# Patient Record
Sex: FEMALE | Race: WHITE | NOT HISPANIC OR LATINO | Employment: OTHER | URBAN - METROPOLITAN AREA
[De-identification: names, ages, dates, MRNs, and addresses within clinical notes are randomized per-mention and may not be internally consistent; named-entity substitution may affect disease eponyms.]

---

## 2017-01-24 ENCOUNTER — HOSPITAL ENCOUNTER (OUTPATIENT)
Dept: ONCOLOGY | Facility: CLINIC | Age: 77
Discharge: HOME | End: 2017-01-24
Attending: INTERNAL MEDICINE | Admitting: INTERNAL MEDICINE

## 2017-06-13 ENCOUNTER — HOSPITAL ENCOUNTER (OUTPATIENT)
Dept: ONCOLOGY | Facility: CLINIC | Age: 77
Discharge: HOME | End: 2017-06-13
Attending: INTERNAL MEDICINE | Admitting: INTERNAL MEDICINE

## 2018-04-03 ENCOUNTER — HOSPITAL ENCOUNTER (OUTPATIENT)
Dept: ONCOLOGY | Facility: CLINIC | Age: 78
Discharge: HOME | End: 2018-04-03
Attending: INTERNAL MEDICINE | Admitting: INTERNAL MEDICINE

## 2018-10-02 ENCOUNTER — HOSPITAL ENCOUNTER (OUTPATIENT)
Dept: ONCOLOGY | Facility: CLINIC | Age: 78
Discharge: HOME | End: 2018-10-02
Attending: INTERNAL MEDICINE | Admitting: INTERNAL MEDICINE

## 2018-10-29 ENCOUNTER — HOSPITAL ENCOUNTER (OUTPATIENT)
Dept: MAMMOGRAPHY | Facility: HOSPITAL | Age: 78
Discharge: HOME | End: 2018-10-29
Attending: NURSE PRACTITIONER | Admitting: NURSE PRACTITIONER

## 2019-01-08 ENCOUNTER — HOSPITAL ENCOUNTER (OUTPATIENT)
Dept: ONCOLOGY | Facility: CLINIC | Age: 79
Discharge: HOME | End: 2019-01-08
Attending: INTERNAL MEDICINE | Admitting: INTERNAL MEDICINE

## 2019-03-19 ENCOUNTER — HOSPITAL ENCOUNTER (OUTPATIENT)
Dept: ONCOLOGY | Facility: CLINIC | Age: 79
Discharge: HOME | End: 2019-03-19
Attending: INTERNAL MEDICINE | Admitting: INTERNAL MEDICINE

## 2019-06-26 ENCOUNTER — HOSPITAL ENCOUNTER (OUTPATIENT)
Facility: HOSPITAL | Age: 79
Setting detail: OBSERVATION
Discharge: HOME | End: 2019-06-27
Attending: INTERNAL MEDICINE | Admitting: INTERNAL MEDICINE

## 2019-06-26 ENCOUNTER — APPOINTMENT (OUTPATIENT)
Dept: GENERAL RADIOLOGY | Facility: HOSPITAL | Age: 79
End: 2019-06-26

## 2019-06-26 ENCOUNTER — APPOINTMENT (OUTPATIENT)
Dept: CT IMAGING | Facility: HOSPITAL | Age: 79
End: 2019-06-26

## 2019-06-26 DIAGNOSIS — R53.1 WEAKNESS: ICD-10-CM

## 2019-06-26 DIAGNOSIS — S70.02XA CONTUSION OF LEFT HIP, INITIAL ENCOUNTER: ICD-10-CM

## 2019-06-26 DIAGNOSIS — S06.0X0A CONCUSSION WITHOUT LOSS OF CONSCIOUSNESS, INITIAL ENCOUNTER: ICD-10-CM

## 2019-06-26 DIAGNOSIS — S00.91XA ABRASION OF HEAD, INITIAL ENCOUNTER: ICD-10-CM
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
## 2019-06-26 PROCEDURE — 99284 EMERGENCY DEPT VISIT MOD MDM: CPT

## 2019-06-26 PROCEDURE — 70450 CT HEAD/BRAIN W/O DYE: CPT

## 2019-06-26 PROCEDURE — 73502 X-RAY EXAM HIP UNI 2-3 VIEWS: CPT

## 2019-06-27 VITALS
BODY MASS INDEX: 18.83 KG/M2 | TEMPERATURE: 98.7 F | HEIGHT: 63 IN | WEIGHT: 106.26 LBS | RESPIRATION RATE: 16 BRPM | OXYGEN SATURATION: 98 % | SYSTOLIC BLOOD PRESSURE: 138 MMHG | DIASTOLIC BLOOD PRESSURE: 50 MMHG | HEART RATE: 78 BPM

## 2019-06-27 PROBLEM — E44.0 PROTEIN-CALORIE MALNUTRITION, MODERATE (HCC): Chronic | Status: ACTIVE | Noted: 2019-06-27

## 2019-06-27 PROBLEM — N18.9 CHRONIC KIDNEY DISEASE (CKD): Chronic | Status: ACTIVE | Noted: 2019-06-27
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
## 2019-06-27 PROBLEM — D63.1 ANEMIA OF RENAL DISEASE: Chronic | Status: ACTIVE | Noted: 2019-06-27

## 2019-06-27 PROBLEM — N18.9 ANEMIA OF RENAL DISEASE: Chronic | Status: ACTIVE | Noted: 2019-06-27

## 2019-06-27 PROBLEM — Z79.899 POLYPHARMACY: Chronic | Status: ACTIVE | Noted: 2019-06-27

## 2019-06-27 PROBLEM — I10 HYPERTENSION: Chronic | Status: ACTIVE | Noted: 2019-06-27

## 2019-06-27 LAB
ALBUMIN SERPL-MCNC: 4.2 G/DL (ref 3.5–4.8)
ALBUMIN/GLOB SERPL: 1.4 G/DL (ref 1–1.7)
ALP SERPL-CCNC: 63 U/L (ref 32–91)
ALT SERPL W P-5'-P-CCNC: 18 U/L (ref 14–54)
ANION GAP SERPL CALCULATED.3IONS-SCNC: 13.8 MMOL/L (ref 10–20)
ANION GAP SERPL CALCULATED.3IONS-SCNC: 16.8 MMOL/L (ref 10–20)
AST SERPL-CCNC: 23 U/L (ref 15–41)
BASOPHILS # BLD AUTO: 0.1 10*3/MM3 (ref 0–0.2)
BASOPHILS # BLD AUTO: 0.1 10*3/MM3 (ref 0–0.2)
BASOPHILS NFR BLD AUTO: 0.7 % (ref 0–1.5)
BASOPHILS NFR BLD AUTO: 0.8 % (ref 0–1.5)
BILIRUB SERPL-MCNC: 0.6 MG/DL (ref 0.3–1.2)
BUN BLD-MCNC: 24 MG/DL (ref 8–20)
BUN BLD-MCNC: 25 MG/DL (ref 8–20)
BUN/CREAT SERPL: 16 (ref 5.4–26.2)
BUN/CREAT SERPL: 16.7 (ref 5.4–26.2)
CALCIUM SPEC-SCNC: 9.5 MG/DL (ref 8.9–10.3)
CALCIUM SPEC-SCNC: 9.8 MG/DL (ref 8.9–10.3)
CHLORIDE SERPL-SCNC: 101 MMOL/L (ref 101–111)
CHLORIDE SERPL-SCNC: 97 MMOL/L (ref 101–111)
CO2 SERPL-SCNC: 25 MMOL/L (ref 22–32)
CO2 SERPL-SCNC: 25 MMOL/L (ref 22–32)
CREAT BLD-MCNC: 1.5 MG/DL (ref 0.4–1)
CREAT BLD-MCNC: 1.5 MG/DL (ref 0.4–1)
DEPRECATED RDW RBC AUTO: 42.4 FL (ref 37–54)
DEPRECATED RDW RBC AUTO: 42.9 FL (ref 37–54)
EOSINOPHIL # BLD AUTO: 0.1 10*3/MM3 (ref 0–0.4)
EOSINOPHIL # BLD AUTO: 0.1 10*3/MM3 (ref 0–0.4)
EOSINOPHIL NFR BLD AUTO: 1.1 % (ref 0.3–6.2)
EOSINOPHIL NFR BLD AUTO: 1.2 % (ref 0.3–6.2)
ERYTHROCYTE [DISTWIDTH] IN BLOOD BY AUTOMATED COUNT: 12.5 % (ref 12.3–15.4)
ERYTHROCYTE [DISTWIDTH] IN BLOOD BY AUTOMATED COUNT: 12.6 % (ref 12.3–15.4)
GFR SERPL CREATININE-BSD FRML MDRD: 33 ML/MIN/1.73
GFR SERPL CREATININE-BSD FRML MDRD: 33 ML/MIN/1.73
GLOBULIN UR ELPH-MCNC: 2.9 GM/DL (ref 2.5–3.8)
GLUCOSE BLD-MCNC: 117 MG/DL (ref 65–99)
GLUCOSE BLD-MCNC: 119 MG/DL (ref 65–99)
HCT VFR BLD AUTO: 28.1 % (ref 34–46.6)
HCT VFR BLD AUTO: 32.2 % (ref 34–46.6)
HGB BLD-MCNC: 10.8 G/DL (ref 12–15.9)
HGB BLD-MCNC: 9.6 G/DL (ref 12–15.9)
LYMPHOCYTES # BLD AUTO: 3 10*3/MM3 (ref 0.7–3.1)
LYMPHOCYTES # BLD AUTO: 3.5 10*3/MM3 (ref 0.7–3.1)
LYMPHOCYTES NFR BLD AUTO: 27.1 % (ref 19.6–45.3)
LYMPHOCYTES NFR BLD AUTO: 36.4 % (ref 19.6–45.3)
MCH RBC QN AUTO: 32.6 PG (ref 26.6–33)
MCH RBC QN AUTO: 33 PG (ref 26.6–33)
MCHC RBC AUTO-ENTMCNC: 33.6 G/DL (ref 31.5–35.7)
MCHC RBC AUTO-ENTMCNC: 34.2 G/DL (ref 31.5–35.7)
MCV RBC AUTO: 96.5 FL (ref 79–97)
MCV RBC AUTO: 97.1 FL (ref 79–97)
MONOCYTES # BLD AUTO: 0.5 10*3/MM3 (ref 0.1–0.9)
MONOCYTES # BLD AUTO: 0.8 10*3/MM3 (ref 0.1–0.9)
MONOCYTES NFR BLD AUTO: 5.2 % (ref 5–12)
MONOCYTES NFR BLD AUTO: 7.2 % (ref 5–12)
NEUTROPHILS # BLD AUTO: 5.4 10*3/MM3 (ref 1.7–7)
NEUTROPHILS # BLD AUTO: 7 10*3/MM3 (ref 1.7–7)
NEUTROPHILS NFR BLD AUTO: 56.4 % (ref 42.7–76)
NEUTROPHILS NFR BLD AUTO: 63.9 % (ref 42.7–76)
NRBC BLD AUTO-RTO: 0 /100 WBC (ref 0–0.2)
NRBC BLD AUTO-RTO: 0.1 /100 WBC (ref 0–0.2)
PLATELET # BLD AUTO: 258 10*3/MM3 (ref 140–450)
PLATELET # BLD AUTO: 283 10*3/MM3 (ref 140–450)
PMV BLD AUTO: 6.9 FL (ref 6–12)
PMV BLD AUTO: 7.1 FL (ref 6–12)
POTASSIUM BLD-SCNC: 3.8 MMOL/L (ref 3.6–5.1)
POTASSIUM BLD-SCNC: 3.8 MMOL/L (ref 3.6–5.1)
PROT SERPL-MCNC: 7.1 G/DL (ref 6.1–7.9)
RBC # BLD AUTO: 2.92 10*6/MM3 (ref 3.77–5.28)
RBC # BLD AUTO: 3.32 10*6/MM3 (ref 3.77–5.28)
SODIUM BLD-SCNC: 135 MMOL/L (ref 136–144)
SODIUM BLD-SCNC: 136 MMOL/L (ref 136–144)
WBC NRBC COR # BLD: 10.9 10*3/MM3 (ref 3.4–10.8)
WBC NRBC COR # BLD: 9.6 10*3/MM3 (ref 3.4–10.8)

## 2019-06-27 PROCEDURE — G0378 HOSPITAL OBSERVATION PER HR: HCPCS

## 2019-06-27 PROCEDURE — 97161 PT EVAL LOW COMPLEX 20 MIN: CPT

## 2019-06-27 PROCEDURE — 97530 THERAPEUTIC ACTIVITIES: CPT

## 2019-06-27 PROCEDURE — 63710000001 PREDNISONE PER 5 MG: Performed by: PHYSICIAN ASSISTANT

## 2019-06-27 PROCEDURE — 96360 HYDRATION IV INFUSION INIT: CPT

## 2019-06-27 PROCEDURE — 97116 GAIT TRAINING THERAPY: CPT

## 2019-06-27 PROCEDURE — 85025 COMPLETE CBC W/AUTO DIFF WBC: CPT | Performed by: PHYSICIAN ASSISTANT

## 2019-06-27 PROCEDURE — 97165 OT EVAL LOW COMPLEX 30 MIN: CPT

## 2019-06-27 PROCEDURE — 96361 HYDRATE IV INFUSION ADD-ON: CPT

## 2019-06-27 PROCEDURE — 80053 COMPREHEN METABOLIC PANEL: CPT | Performed by: PHYSICIAN ASSISTANT

## 2019-06-27 PROCEDURE — 99236 HOSP IP/OBS SAME DATE HI 85: CPT | Performed by: INTERNAL MEDICINE

## 2019-06-27 RX ORDER — DULOXETIN HYDROCHLORIDE 30 MG/1
60 CAPSULE, DELAYED RELEASE ORAL DAILY
Status: DISCONTINUED | OUTPATIENT
Start: 2019-06-27 | End: 2019-06-27 | Stop reason: HOSPADM

## 2019-06-27 RX ORDER — OXYBUTYNIN CHLORIDE 10 MG/1
10 TABLET, EXTENDED RELEASE ORAL DAILY
COMMUNITY
End: 2019-10-04 | Stop reason: SDUPTHER

## 2019-06-27 RX ORDER — POTASSIUM CHLORIDE 20 MEQ/1
40 TABLET, EXTENDED RELEASE ORAL AS NEEDED
Status: DISCONTINUED | OUTPATIENT
Start: 2019-06-27 | End: 2019-06-27 | Stop reason: HOSPADM

## 2019-06-27 RX ORDER — NITROFURANTOIN 25; 75 MG/1; MG/1
100 CAPSULE ORAL 2 TIMES DAILY
COMMUNITY
End: 2019-06-27 | Stop reason: HOSPADM

## 2019-06-27 RX ORDER — CHOLECALCIFEROL (VITAMIN D3) 125 MCG
5 CAPSULE ORAL NIGHTLY PRN
Status: DISCONTINUED | OUTPATIENT
Start: 2019-06-27 | End: 2019-06-27 | Stop reason: HOSPADM

## 2019-06-27 RX ORDER — ALUMINA, MAGNESIA, AND SIMETHICONE 2400; 2400; 240 MG/30ML; MG/30ML; MG/30ML
15 SUSPENSION ORAL EVERY 6 HOURS PRN
Status: DISCONTINUED | OUTPATIENT
Start: 2019-06-27 | End: 2019-06-27 | Stop reason: HOSPADM

## 2019-06-27 RX ORDER — PANTOPRAZOLE SODIUM 40 MG/1
40 TABLET, DELAYED RELEASE ORAL
Status: DISCONTINUED | OUTPATIENT
Start: 2019-06-27 | End: 2019-06-27 | Stop reason: HOSPADM

## 2019-06-27 RX ORDER — SODIUM CHLORIDE 0.9 % (FLUSH) 0.9 %
3 SYRINGE (ML) INJECTION EVERY 12 HOURS SCHEDULED
Status: DISCONTINUED | OUTPATIENT
Start: 2019-06-27 | End: 2019-06-27 | Stop reason: HOSPADM

## 2019-06-27 RX ORDER — SERTRALINE HYDROCHLORIDE 100 MG/1
100 TABLET, FILM COATED ORAL DAILY
COMMUNITY
End: 2020-01-14 | Stop reason: SDUPTHER

## 2019-06-27 RX ORDER — SERTRALINE HYDROCHLORIDE 100 MG/1
100 TABLET, FILM COATED ORAL DAILY
Status: DISCONTINUED | OUTPATIENT
Start: 2019-06-27 | End: 2019-06-27 | Stop reason: HOSPADM

## 2019-06-27 RX ORDER — POLYETHYLENE GLYCOL 3350 17 G/17G
17 POWDER, FOR SOLUTION ORAL DAILY
Status: DISCONTINUED | OUTPATIENT
Start: 2019-06-27 | End: 2019-06-27 | Stop reason: HOSPADM

## 2019-06-27 RX ORDER — FERROUS SULFATE TAB EC 324 MG (65 MG FE EQUIVALENT) 324 (65 FE) MG
324 TABLET DELAYED RESPONSE ORAL 2 TIMES DAILY
Status: DISCONTINUED | OUTPATIENT
Start: 2019-06-27 | End: 2019-06-27 | Stop reason: HOSPADM

## 2019-06-27 RX ORDER — SODIUM CHLORIDE 0.9 % (FLUSH) 0.9 %
3-10 SYRINGE (ML) INJECTION AS NEEDED
Status: DISCONTINUED | OUTPATIENT
Start: 2019-06-27 | End: 2019-06-27 | Stop reason: HOSPADM

## 2019-06-27 RX ORDER — NITROFURANTOIN 25; 75 MG/1; MG/1
100 CAPSULE ORAL 2 TIMES DAILY
Status: DISCONTINUED | OUTPATIENT
Start: 2019-06-27 | End: 2019-06-27 | Stop reason: HOSPADM

## 2019-06-27 RX ORDER — FENTANYL 50 UG/H
1 PATCH TRANSDERMAL
COMMUNITY
End: 2019-10-04 | Stop reason: SDUPTHER

## 2019-06-27 RX ORDER — DOCUSATE SODIUM 100 MG/1
100 CAPSULE, LIQUID FILLED ORAL 2 TIMES DAILY PRN
Status: DISCONTINUED | OUTPATIENT
Start: 2019-06-27 | End: 2019-06-27 | Stop reason: HOSPADM

## 2019-06-27 RX ORDER — POTASSIUM CHLORIDE 750 MG/1
10 TABLET, EXTENDED RELEASE ORAL 2 TIMES DAILY
COMMUNITY
End: 2019-06-27 | Stop reason: HOSPADM

## 2019-06-27 RX ORDER — ACETAMINOPHEN 650 MG/1
650 SUPPOSITORY RECTAL EVERY 4 HOURS PRN
Status: DISCONTINUED | OUTPATIENT
Start: 2019-06-27 | End: 2019-06-27 | Stop reason: HOSPADM

## 2019-06-27 RX ORDER — DOCUSATE SODIUM 100 MG/1
100 CAPSULE, LIQUID FILLED ORAL DAILY
COMMUNITY
End: 2020-01-14 | Stop reason: SDUPTHER

## 2019-06-27 RX ORDER — SODIUM CHLORIDE 0.9 % (FLUSH) 0.9 %
10 SYRINGE (ML) INJECTION AS NEEDED
Status: DISCONTINUED | OUTPATIENT
Start: 2019-06-27 | End: 2019-06-27 | Stop reason: HOSPADM

## 2019-06-27 RX ORDER — POTASSIUM CHLORIDE 1.5 G/1.77G
40 POWDER, FOR SOLUTION ORAL AS NEEDED
Status: DISCONTINUED | OUTPATIENT
Start: 2019-06-27 | End: 2019-06-27 | Stop reason: HOSPADM

## 2019-06-27 RX ORDER — MEMANTINE HYDROCHLORIDE 10 MG/1
10 TABLET ORAL 2 TIMES DAILY
Status: DISCONTINUED | OUTPATIENT
Start: 2019-06-27 | End: 2019-06-27 | Stop reason: HOSPADM

## 2019-06-27 RX ORDER — FUROSEMIDE 20 MG/1
20 TABLET ORAL
Status: DISCONTINUED | OUTPATIENT
Start: 2019-06-27 | End: 2019-06-27 | Stop reason: HOSPADM

## 2019-06-27 RX ORDER — LEVOTHYROXINE SODIUM 0.05 MG/1
50 TABLET ORAL DAILY
COMMUNITY
End: 2019-11-12

## 2019-06-27 RX ORDER — HYDROCODONE BITARTRATE AND ACETAMINOPHEN 5; 325 MG/1; MG/1
1 TABLET ORAL ONCE AS NEEDED
Status: DISCONTINUED | OUTPATIENT
Start: 2019-06-27 | End: 2019-06-27

## 2019-06-27 RX ORDER — ACETAMINOPHEN 325 MG/1
650 TABLET ORAL EVERY 4 HOURS PRN
Status: DISCONTINUED | OUTPATIENT
Start: 2019-06-27 | End: 2019-06-27 | Stop reason: HOSPADM

## 2019-06-27 RX ORDER — CYCLOBENZAPRINE HCL 10 MG
10 TABLET ORAL 2 TIMES DAILY
COMMUNITY
End: 2019-06-27 | Stop reason: HOSPADM

## 2019-06-27 RX ORDER — DONEPEZIL HYDROCHLORIDE 5 MG/1
10 TABLET, FILM COATED ORAL DAILY
Status: DISCONTINUED | OUTPATIENT
Start: 2019-06-27 | End: 2019-06-27 | Stop reason: HOSPADM

## 2019-06-27 RX ORDER — CALCIUM CARBONATE 200(500)MG
2 TABLET,CHEWABLE ORAL 2 TIMES DAILY PRN
Status: DISCONTINUED | OUTPATIENT
Start: 2019-06-27 | End: 2019-06-27 | Stop reason: HOSPADM

## 2019-06-27 RX ORDER — POLYETHYLENE GLYCOL 3350 17 G/17G
17 POWDER, FOR SOLUTION ORAL AS NEEDED
COMMUNITY
End: 2020-01-14

## 2019-06-27 RX ORDER — HYDRALAZINE HYDROCHLORIDE 25 MG/1
25 TABLET, FILM COATED ORAL 3 TIMES DAILY
Status: DISCONTINUED | OUTPATIENT
Start: 2019-06-27 | End: 2019-06-27 | Stop reason: HOSPADM

## 2019-06-27 RX ORDER — ROPINIROLE 1 MG/1
1 TABLET, FILM COATED ORAL NIGHTLY
Status: ON HOLD | COMMUNITY
End: 2019-06-27

## 2019-06-27 RX ORDER — LORAZEPAM 0.5 MG/1
0.5 TABLET ORAL 2 TIMES DAILY PRN
COMMUNITY
End: 2019-06-27 | Stop reason: HOSPADM

## 2019-06-27 RX ORDER — HYDRALAZINE HYDROCHLORIDE 25 MG/1
25 TABLET, FILM COATED ORAL 3 TIMES DAILY
COMMUNITY
End: 2019-10-04 | Stop reason: SDUPTHER

## 2019-06-27 RX ORDER — FERROUS SULFATE 325(65) MG
325 TABLET ORAL
Qty: 30 TABLET | Refills: 0
Start: 2019-06-27 | End: 2019-07-27

## 2019-06-27 RX ORDER — LUBIPROSTONE 24 UG/1
24 CAPSULE ORAL 2 TIMES DAILY WITH MEALS
COMMUNITY
End: 2019-10-04

## 2019-06-27 RX ORDER — OMEPRAZOLE 40 MG/1
40 CAPSULE, DELAYED RELEASE ORAL 2 TIMES DAILY
COMMUNITY
End: 2020-01-14

## 2019-06-27 RX ORDER — ONDANSETRON 4 MG/1
4 TABLET, FILM COATED ORAL EVERY 6 HOURS PRN
Status: DISCONTINUED | OUTPATIENT
Start: 2019-06-27 | End: 2019-06-27 | Stop reason: HOSPADM

## 2019-06-27 RX ORDER — DULOXETIN HYDROCHLORIDE 60 MG/1
60 CAPSULE, DELAYED RELEASE ORAL DAILY
COMMUNITY
End: 2019-12-17 | Stop reason: SDUPTHER

## 2019-06-27 RX ORDER — FERROUS SULFATE 325(65) MG
325 TABLET ORAL 2 TIMES DAILY
Status: ON HOLD | COMMUNITY
End: 2019-06-27 | Stop reason: SDUPTHER

## 2019-06-27 RX ORDER — LUBIPROSTONE 24 UG/1
24 CAPSULE ORAL 2 TIMES DAILY WITH MEALS
Status: DISCONTINUED | OUTPATIENT
Start: 2019-06-27 | End: 2019-06-27 | Stop reason: HOSPADM

## 2019-06-27 RX ORDER — LEVOTHYROXINE SODIUM 0.05 MG/1
50 TABLET ORAL
Status: DISCONTINUED | OUTPATIENT
Start: 2019-06-27 | End: 2019-06-27 | Stop reason: HOSPADM

## 2019-06-27 RX ORDER — BISACODYL 10 MG
10 SUPPOSITORY, RECTAL RECTAL DAILY PRN
Status: DISCONTINUED | OUTPATIENT
Start: 2019-06-27 | End: 2019-06-27 | Stop reason: HOSPADM

## 2019-06-27 RX ORDER — ROPINIROLE 1 MG/1
1 TABLET, FILM COATED ORAL NIGHTLY
Status: DISCONTINUED | OUTPATIENT
Start: 2019-06-27 | End: 2019-06-27 | Stop reason: HOSPADM

## 2019-06-27 RX ORDER — CYCLOBENZAPRINE HCL 10 MG
10 TABLET ORAL 2 TIMES DAILY
Status: DISCONTINUED | OUTPATIENT
Start: 2019-06-27 | End: 2019-06-27 | Stop reason: HOSPADM

## 2019-06-27 RX ORDER — OXYBUTYNIN CHLORIDE 10 MG/1
10 TABLET, EXTENDED RELEASE ORAL DAILY
Status: DISCONTINUED | OUTPATIENT
Start: 2019-06-27 | End: 2019-06-27 | Stop reason: HOSPADM

## 2019-06-27 RX ORDER — SODIUM CHLORIDE 9 MG/ML
75 INJECTION, SOLUTION INTRAVENOUS CONTINUOUS
Status: DISCONTINUED | OUTPATIENT
Start: 2019-06-27 | End: 2019-06-27 | Stop reason: HOSPADM

## 2019-06-27 RX ORDER — ONDANSETRON 2 MG/ML
4 INJECTION INTRAMUSCULAR; INTRAVENOUS EVERY 6 HOURS PRN
Status: DISCONTINUED | OUTPATIENT
Start: 2019-06-27 | End: 2019-06-27 | Stop reason: HOSPADM

## 2019-06-27 RX ORDER — PREDNISONE 1 MG/1
5 TABLET ORAL DAILY
Status: DISCONTINUED | OUTPATIENT
Start: 2019-06-27 | End: 2019-06-27 | Stop reason: HOSPADM

## 2019-06-27 RX ORDER — ROPINIROLE 1 MG/1
0.5 TABLET, FILM COATED ORAL NIGHTLY
Qty: 15 TABLET | Refills: 0
Start: 2019-06-27 | End: 2019-07-27

## 2019-06-27 RX ORDER — CLONIDINE HYDROCHLORIDE 0.1 MG/1
0.1 TABLET ORAL ONCE
Status: COMPLETED | OUTPATIENT
Start: 2019-06-27 | End: 2019-06-27

## 2019-06-27 RX ORDER — FUROSEMIDE 20 MG/1
20 TABLET ORAL 2 TIMES DAILY
COMMUNITY
End: 2019-06-27 | Stop reason: HOSPADM

## 2019-06-27 RX ORDER — PREDNISONE 1 MG/1
5 TABLET ORAL DAILY
COMMUNITY
End: 2020-01-14 | Stop reason: SDUPTHER

## 2019-06-27 RX ADMIN — NITROFURANTOIN (MONOHYDRATE/MACROCRYSTALS) 100 MG: 75; 25 CAPSULE ORAL at 10:04

## 2019-06-27 RX ADMIN — DULOXETINE 60 MG: 30 CAPSULE, DELAYED RELEASE ORAL at 10:04

## 2019-06-27 RX ADMIN — LEVOTHYROXINE SODIUM 50 MCG: 50 TABLET ORAL at 05:21

## 2019-06-27 RX ADMIN — NALOXEGOL OXALATE 25 MG: 25 TABLET, FILM COATED ORAL at 05:21

## 2019-06-27 RX ADMIN — HYDROCODONE BITARTRATE AND ACETAMINOPHEN 1 TABLET: 5; 325 TABLET ORAL at 00:50

## 2019-06-27 RX ADMIN — PANTOPRAZOLE SODIUM 40 MG: 40 TABLET, DELAYED RELEASE ORAL at 05:21

## 2019-06-27 RX ADMIN — PREDNISONE 5 MG: 5 TABLET ORAL at 10:06

## 2019-06-27 RX ADMIN — MEMANTINE 10 MG: 10 TABLET ORAL at 10:06

## 2019-06-27 RX ADMIN — SODIUM CHLORIDE 75 ML/HR: 900 INJECTION, SOLUTION INTRAVENOUS at 05:21

## 2019-06-27 RX ADMIN — FUROSEMIDE 20 MG: 20 TABLET ORAL at 17:16

## 2019-06-27 RX ADMIN — MEMANTINE 10 MG: 10 TABLET ORAL at 20:36

## 2019-06-27 RX ADMIN — LUBIPROSTONE 24 MCG: 24 CAPSULE, GELATIN COATED ORAL at 10:06

## 2019-06-27 RX ADMIN — FERROUS SULFATE TAB EC 324 MG (65 MG FE EQUIVALENT) 324 MG: 324 (65 FE) TABLET DELAYED RESPONSE at 20:36

## 2019-06-27 RX ADMIN — CYCLOBENZAPRINE HYDROCHLORIDE 10 MG: 10 TABLET, FILM COATED ORAL at 10:06

## 2019-06-27 RX ADMIN — ROPINIROLE 1 MG: 1 TABLET, FILM COATED ORAL at 20:36

## 2019-06-27 RX ADMIN — OXYBUTYNIN CHLORIDE 10 MG: 10 TABLET, EXTENDED RELEASE ORAL at 10:12

## 2019-06-27 RX ADMIN — CYCLOBENZAPRINE HYDROCHLORIDE 10 MG: 10 TABLET, FILM COATED ORAL at 20:36

## 2019-06-27 RX ADMIN — NITROFURANTOIN (MONOHYDRATE/MACROCRYSTALS) 100 MG: 75; 25 CAPSULE ORAL at 20:40

## 2019-06-27 RX ADMIN — FERROUS SULFATE TAB EC 324 MG (65 MG FE EQUIVALENT) 324 MG: 324 (65 FE) TABLET DELAYED RESPONSE at 10:06

## 2019-06-27 RX ADMIN — LUBIPROSTONE 24 MCG: 24 CAPSULE, GELATIN COATED ORAL at 17:18

## 2019-06-27 RX ADMIN — FUROSEMIDE 20 MG: 20 TABLET ORAL at 10:07

## 2019-06-27 RX ADMIN — CYCLOBENZAPRINE HYDROCHLORIDE 10 MG: 10 TABLET, FILM COATED ORAL at 10:11

## 2019-06-27 RX ADMIN — HYDRALAZINE HYDROCHLORIDE 25 MG: 25 TABLET, FILM COATED ORAL at 17:16

## 2019-06-27 RX ADMIN — ACETAMINOPHEN 650 MG: 325 TABLET, FILM COATED ORAL at 12:46

## 2019-06-27 RX ADMIN — DONEPEZIL HYDROCHLORIDE 10 MG: 5 TABLET, FILM COATED ORAL at 10:09

## 2019-06-27 RX ADMIN — HYDRALAZINE HYDROCHLORIDE 25 MG: 25 TABLET, FILM COATED ORAL at 20:36

## 2019-06-27 RX ADMIN — SERTRALINE 100 MG: 100 TABLET, FILM COATED ORAL at 10:05

## 2019-06-27 RX ADMIN — CLONIDINE HYDROCHLORIDE 0.1 MG: 0.1 TABLET ORAL at 00:38

## 2019-06-27 RX ADMIN — HYDRALAZINE HYDROCHLORIDE 25 MG: 25 TABLET, FILM COATED ORAL at 10:04

## 2019-06-28 ENCOUNTER — READMISSION MANAGEMENT (OUTPATIENT)
Dept: CALL CENTER | Facility: HOSPITAL | Age: 79
End: 2019-06-28

## 2019-06-28 NOTE — OUTREACH NOTE
Prep Survey      Responses   Facility patient discharged from?  [facility]   Is patient eligible?  Yes   Discharge diagnosis  sp fall, HTN, hx CVA, migraines, CKD, CA, anemia, RLS, chronic back pain   Does the patient have one of the following disease processes/diagnoses(primary or secondary)?  Other   Does the patient have Home health ordered?  Yes   What is the Home health agency?   [facility]   Is there a DME ordered?  No   Prep survey completed?  Yes          May Sánchez RN

## 2019-07-01 ENCOUNTER — READMISSION MANAGEMENT (OUTPATIENT)
Dept: CALL CENTER | Facility: HOSPITAL | Age: 79
End: 2019-07-01

## 2019-07-01 NOTE — OUTREACH NOTE
Medical Week 1 Survey      Responses   Facility patient discharged from?  [facility]   Does the patient have one of the following disease processes/diagnoses(primary or secondary)?  Other   Is there a successful TCM telephone encounter documented?  No   Week 1 attempt successful?  Yes   Call start time  1022   Call end time  1023   Discharge diagnosis  sp fall, HTN, hx CVA, migraines, CKD, CA, anemia, RLS, chronic back pain   Meds reviewed with patient/caregiver?  Yes   Is the patient having any side effects they believe may be caused by any medication additions or changes?  No   Does the patient have all medications ordered at discharge?  N/A   Is the patient taking all medications as directed (includes completed medication regime)?  Yes   Medication comments  Patient states she has a copy of all the changes in her medications and has followed them   Does the patient have a primary care provider?   Yes   Does the patient have an appointment with their PCP within 7 days of discharge?  Greater than 7 days   What is preventing the patient from scheduling follow up appointments within 7 days of discharge?  Earlier appointment not available   Did the patient receive a copy of their discharge instructions?  Yes   Nursing interventions  Reviewed instructions with patient   Is the patient/caregiver able to teach back the hierarchy of who to call/visit for symptoms/problems? PCP, Specialist, Home health nurse, Urgent Care, ED, 911  Yes   Week 1 call completed?  Yes   Graduated  Yes   Did the patient feel the follow up calls were helpful during their recovery period?  Yes   Graduated/Revoked comments  Patient states she is doing well and has no questions or needs at this time          Noemi Redd LPN

## 2019-08-05 ENCOUNTER — OFFICE VISIT (OUTPATIENT)
Dept: CARDIOLOGY | Facility: CLINIC | Age: 79
End: 2019-08-05

## 2019-08-05 VITALS
DIASTOLIC BLOOD PRESSURE: 48 MMHG | BODY MASS INDEX: 18.95 KG/M2 | HEIGHT: 62 IN | HEART RATE: 71 BPM | SYSTOLIC BLOOD PRESSURE: 151 MMHG | WEIGHT: 103 LBS

## 2019-08-05 DIAGNOSIS — R01.1 CARDIAC MURMUR: ICD-10-CM

## 2019-08-05 DIAGNOSIS — I10 ESSENTIAL HYPERTENSION: Primary | ICD-10-CM

## 2019-08-05 DIAGNOSIS — N18.30 STAGE 3 CHRONIC KIDNEY DISEASE (HCC): Chronic | ICD-10-CM

## 2019-08-05 DIAGNOSIS — E44.0 PROTEIN-CALORIE MALNUTRITION, MODERATE (HCC): Chronic | ICD-10-CM

## 2019-08-05 PROBLEM — R06.02 SHORTNESS OF BREATH: Status: ACTIVE | Noted: 2017-06-12

## 2019-08-05 PROCEDURE — 99214 OFFICE O/P EST MOD 30 MIN: CPT | Performed by: INTERNAL MEDICINE

## 2019-08-05 RX ORDER — CYCLOSPORINE 0.5 MG/ML
EMULSION OPHTHALMIC 2 TIMES DAILY
Refills: 6 | COMMUNITY
Start: 2019-07-10 | End: 2020-03-20 | Stop reason: SDUPTHER

## 2019-08-05 RX ORDER — EPINEPHRINE 0.3 MG/.3ML
0.3 INJECTION SUBCUTANEOUS DAILY PRN
COMMUNITY

## 2019-08-05 RX ORDER — ROPINIROLE 1 MG/1
1 TABLET, FILM COATED ORAL 3 TIMES DAILY
COMMUNITY
End: 2019-10-04 | Stop reason: SDUPTHER

## 2019-08-05 RX ORDER — LANOLIN ALCOHOL/MO/W.PET/CERES
1000 CREAM (GRAM) TOPICAL DAILY
COMMUNITY
End: 2019-10-04

## 2019-08-05 RX ORDER — NITROFURANTOIN 25; 75 MG/1; MG/1
100 CAPSULE ORAL 2 TIMES DAILY
Refills: 5 | COMMUNITY
Start: 2019-07-22 | End: 2019-11-08 | Stop reason: SDUPTHER

## 2019-08-05 RX ORDER — CHOLECALCIFEROL (VITAMIN D3) 125 MCG
5 CAPSULE ORAL NIGHTLY PRN
COMMUNITY

## 2019-08-05 RX ORDER — CYCLOBENZAPRINE HCL 10 MG
10 TABLET ORAL 3 TIMES DAILY
Refills: 2 | COMMUNITY
Start: 2019-07-25 | End: 2019-11-09 | Stop reason: SDUPTHER

## 2019-08-05 RX ORDER — AMLODIPINE BESYLATE 5 MG/1
5 TABLET ORAL DAILY
COMMUNITY
End: 2019-11-08

## 2019-08-05 RX ORDER — FUROSEMIDE 20 MG/1
40 TABLET ORAL DAILY
Refills: 3 | COMMUNITY
Start: 2019-07-22 | End: 2020-03-20 | Stop reason: SDUPTHER

## 2019-08-05 RX ORDER — FERROUS SULFATE 325(65) MG
325 TABLET ORAL
COMMUNITY
End: 2019-10-04 | Stop reason: SDUPTHER

## 2019-08-05 RX ORDER — MEMANTINE HYDROCHLORIDE 7 MG/1
CAPSULE, EXTENDED RELEASE ORAL DAILY
COMMUNITY
End: 2019-10-04

## 2019-08-05 RX ORDER — POTASSIUM CHLORIDE 750 MG/1
10 TABLET, FILM COATED, EXTENDED RELEASE ORAL 2 TIMES DAILY
Refills: 2 | COMMUNITY
Start: 2019-06-12 | End: 2020-03-20 | Stop reason: SDUPTHER

## 2019-08-05 RX ORDER — MECLIZINE HCL 25MG 25 MG/1
25 TABLET, CHEWABLE ORAL 2 TIMES DAILY PRN
COMMUNITY
End: 2020-01-14

## 2019-08-05 RX ORDER — CALCITRIOL 0.25 UG/1
0.25 CAPSULE, LIQUID FILLED ORAL DAILY
COMMUNITY
End: 2019-08-30

## 2019-08-05 RX ORDER — SUMATRIPTAN 25 MG/1
TABLET, FILM COATED ORAL AS NEEDED
Refills: 0 | COMMUNITY
Start: 2019-07-29 | End: 2019-11-08 | Stop reason: SDUPTHER

## 2019-08-05 NOTE — PROGRESS NOTES
Date of Office Visit: 2019  Encounter Provider: Bart Mayo MD  Place of Service: [facility] CARDIOLOGY [place]  Patient Name: Ann Andre  :1940  Chasidy Ordaz    Chief Complaint   Patient presents with   • Shortness of Breath  Syncope  Weight loss  Hypertension       6 month f/u   • Edema   • Heart Murmur     History of Present Illness    I am pleased to see Mrs. Andre in my office today as a follow-up.    As you know, patient is 79 years old white female whose past medical history significant for hypertension, history of CVA, neuropathy, history of syncope, who came today for follow-up.    In , patient underwent cardiac catheterization and was found to have no significant CAD.  In , patient has quit smoking.  Patient has history of CVA.  , echocardiogram showed LVEF of 55 to 60%.  Patient had moderate tricuspid regurgitation and mild aortic regurgitation.  In 2019, patient had syncopal episode which appears to be vasovagal in nature.  However possibility of sick sinus syndrome was entertained.  Holter monitor was done which was unremarkable.  Patient had relatively low blood pressure and blood pressure monitoring was recommended.    Since the previous visit, patient is losing weight.  She had work-up by PCP and so for no significant findings and explanation is noted.  However she is found to have nodules in the left lung.  Patient denies any chest pain or tightness or heaviness.  No further episode of syncope or presyncope..  No leg edema noted.    EKG showed normal sinus rhythm.    Patient has not had any further episode of syncope which is good.  However patient has been losing weight and was found to have no significant findings on work-up except for small nodule in the left lung.  I would recommend to proceed with pulmonary consultation.  Patient is awaiting possible biopsy.  From a cardiac standpoint patient is stable.  Her blood pressure is slightly high  but I would not recommend aggressive blood pressure control in this patient because of risk of fall.    Past Medical History:   Diagnosis Date   • Anemia of renal disease 6/27/2019   • Arthritis    • Cancer (CMS/HCC)    • Chronic back pain    • Chronic kidney disease (CKD)    • Depression    • Elevated cholesterol    • Hypertension    • Migraine    • Polypharmacy 6/27/2019   • Protein-calorie malnutrition, moderate (CMS/HCC) 6/27/2019   • Restless leg syndrome    • Stroke (CMS/HCC)          Past Surgical History:   Procedure Laterality Date   • APPENDECTOMY     • HYSTERECTOMY     • JOINT REPLACEMENT             Current Outpatient Medications:   •  amLODIPine (NORVASC) 5 MG tablet, Take 5 mg by mouth Daily., Disp: , Rfl:   •  Biotin 5000 MCG capsule, Take  by mouth Daily., Disp: , Rfl:   •  calcitriol (ROCALTROL) 0.25 MCG capsule, Take 0.25 mcg by mouth Daily., Disp: , Rfl:   •  Cranberry-Cholecalciferol 4200-500 MG-UNIT capsule, Take  by mouth Daily., Disp: , Rfl:   •  cyclobenzaprine (FLEXERIL) 10 MG tablet, Take 10 mg by mouth 3 (Three) Times a Day., Disp: , Rfl: 2  •  docusate sodium (COLACE) 100 MG capsule, Take 100 mg by mouth Daily., Disp: , Rfl:   •  DULoxetine (CYMBALTA) 60 MG capsule, Take 60 mg by mouth Daily., Disp: , Rfl:   •  EPINEPHrine (EPIPEN) 0.3 MG/0.3ML solution auto-injector injection, Daily., Disp: , Rfl:   •  fentaNYL (DURAGESIC) 50 MCG/HR patch, Place 1 patch on the skin as directed by provider Every 72 (Seventy-Two) Hours., Disp: , Rfl:   •  ferrous sulfate 325 (65 FE) MG tablet, Take 325 mg by mouth Daily With Breakfast., Disp: , Rfl:   •  furosemide (LASIX) 20 MG tablet, Take 20 mg by mouth., Disp: , Rfl: 3  •  hydrALAZINE (APRESOLINE) 25 MG tablet, Take 25 mg by mouth 3 (Three) Times a Day., Disp: , Rfl:   •  levothyroxine (SYNTHROID, LEVOTHROID) 50 MCG tablet, Take 50 mcg by mouth Daily., Disp: , Rfl:   •  lubiprostone (AMITIZA) 24 MCG capsule, Take 24 mcg by mouth 2 (Two) Times a Day  With Meals., Disp: , Rfl:   •  meclizine 25 MG chewable tablet chewable tablet, Chew 25 mg 2 (Two) Times a Day As Needed., Disp: , Rfl:   •  melatonin 5 MG tablet tablet, Take 5 mg by mouth At Night As Needed., Disp: , Rfl:   •  memantine (NAMENDA XR) 7 MG capsule sustained-release 24 hr extended release capsule, Take  by mouth Daily., Disp: , Rfl:   •  Naloxegol Oxalate (MOVANTIK) 25 MG tablet, Take 25 mg by mouth Every Morning., Disp: , Rfl:   •  nitrofurantoin, macrocrystal-monohydrate, (MACROBID) 100 MG capsule, Take 100 mg by mouth 4 (Four) Times a Day., Disp: , Rfl: 5  •  omeprazole (priLOSEC) 40 MG capsule, Take 40 mg by mouth 2 (Two) Times a Day., Disp: , Rfl:   •  oxybutynin XL (DITROPAN-XL) 10 MG 24 hr tablet, Take 10 mg by mouth Daily., Disp: , Rfl:   •  polyethylene glycol (MIRALAX) packet, Take 17 g by mouth Daily., Disp: , Rfl:   •  potassium chloride (K-DUR) 10 MEQ CR tablet, Take 10 mEq by mouth 2 (Two) Times a Day., Disp: , Rfl: 2  •  predniSONE (DELTASONE) 5 MG tablet, Take 5 mg by mouth Daily., Disp: , Rfl:   •  RESTASIS 0.05 % ophthalmic emulsion, 2 (Two) Times a Day., Disp: , Rfl: 6  •  rOPINIRole (REQUIP) 1 MG tablet, Take 1 mg by mouth 3 (Three) Times a Day. Take 1 hour before bedtime., Disp: , Rfl:   •  sertraline (ZOLOFT) 100 MG tablet, Take 100 mg by mouth Daily., Disp: , Rfl:   •  SUMAtriptan (IMITREX) 25 MG tablet, As Needed., Disp: , Rfl: 0  •  vitamin B-12 (CYANOCOBALAMIN) 1000 MCG tablet, Take 1,000 mcg by mouth Daily., Disp: , Rfl:       Social History     Socioeconomic History   • Marital status:      Spouse name: Not on file   • Number of children: Not on file   • Years of education: Not on file   • Highest education level: Not on file   Tobacco Use   • Smoking status: Former Smoker     Types: Cigarettes     Last attempt to quit: 2014     Years since quittin.1   • Smokeless tobacco: Never Used   Substance and Sexual Activity   • Alcohol use: No     Frequency: Never  "  • Drug use: No   • Sexual activity: Defer         Review of Systems   Constitution: Negative for chills and fever.   HENT: Negative for ear discharge and nosebleeds.    Eyes: Negative for discharge and redness.   Cardiovascular: Negative for chest pain, orthopnea, palpitations, paroxysmal nocturnal dyspnea and syncope.   Respiratory: Positive for shortness of breath. Negative for cough and wheezing.    Endocrine: Negative for heat intolerance.   Skin: Negative for rash.   Musculoskeletal: Negative for arthritis and myalgias.   Gastrointestinal: Negative for abdominal pain, melena, nausea and vomiting.   Genitourinary: Negative for dysuria and hematuria.   Neurological: Negative for dizziness, light-headedness, numbness and tremors.   Psychiatric/Behavioral: Negative for depression. The patient is not nervous/anxious.        Procedures    Procedures    No orders to display           Objective:    /48   Pulse 71   Ht 157.5 cm (62\")   Wt 46.7 kg (103 lb)   BMI 18.84 kg/m²         Physical Exam   Constitutional: She is oriented to person, place, and time. She appears well-developed and well-nourished.   HENT:   Head: Normocephalic and atraumatic.   Eyes: Right eye exhibits no discharge. No scleral icterus.   Neck: No thyromegaly present.   Cardiovascular: Normal rate and regular rhythm. Exam reveals no gallop and no friction rub.   Murmur heard.  Pulmonary/Chest: Effort normal and breath sounds normal. No respiratory distress. She has no wheezes. She has no rales.   Abdominal: There is no tenderness.   Musculoskeletal: She exhibits no edema.   Lymphadenopathy:     She has no cervical adenopathy.   Neurological: She is alert and oriented to person, place, and time.   Skin: No rash noted. No erythema.   Psychiatric: She has a normal mood and affect.           Assessment:       Diagnosis Plan   1. Essential hypertension     2. Cardiac murmur     3. Protein-calorie malnutrition, moderate (CMS/HCC)     4. Stage 3 " chronic kidney disease (CMS/HCC)              Plan:       At this stage I would continue current treatment.  I will refer the patient to pulmonologist for symptom of shortness of breath and possible COPD and pulmonary nodule.  Blood pressure monitoring is recommended at home.  I will see the patient in 6 months

## 2019-08-29 PROBLEM — E61.1 IRON DEFICIENCY: Status: ACTIVE | Noted: 2019-08-29

## 2019-08-29 PROBLEM — R51.9 CHRONIC HEADACHES: Status: ACTIVE | Noted: 2019-08-29

## 2019-08-29 PROBLEM — C91.10 CLL (CHRONIC LYMPHOCYTIC LEUKEMIA) (HCC): Status: ACTIVE | Noted: 2019-08-29

## 2019-08-29 PROBLEM — R42 DIZZINESS: Status: ACTIVE | Noted: 2019-08-29

## 2019-08-29 PROBLEM — N18.9 ANEMIA OF CHRONIC KIDNEY FAILURE, UNSPECIFIED STAGE: Status: ACTIVE | Noted: 2019-08-29

## 2019-08-29 PROBLEM — E83.52 HYPERCALCEMIA: Status: ACTIVE | Noted: 2019-08-29

## 2019-08-29 PROBLEM — G89.29 CHRONIC HEADACHES: Status: ACTIVE | Noted: 2019-08-29

## 2019-08-29 PROBLEM — D63.1 ANEMIA OF CHRONIC KIDNEY FAILURE, UNSPECIFIED STAGE: Status: ACTIVE | Noted: 2019-08-29

## 2019-08-29 PROBLEM — R55 SYNCOPE: Status: ACTIVE | Noted: 2019-02-04

## 2019-08-29 NOTE — PROGRESS NOTES
HEMATOLOGY ONCOLOGY FOLLOW UP        Patient name: Ann Andre  : 1940  MRN: [id number]  Primary Care Physician: Rochelle Fletcher MD  Referring Physician: Rochelle Fletcher, *  Reason For Consult:     Chief Complaint   Patient presents with   • Follow-up     lung mass   Chronic lymphocytic leukemia  Anemia    History of Present Illness:  Ms. Andre is a pleasant 78-year-old female who underwent lab work at her Primary Care Physician’s office on 10/27/14 that showed leukocytosis with a white count of 12.1 and normal hemoglobin and platelet count.  Her absolute lymphocyte count is 8,000 (elevated).  ESR was normal at 6.  The patient was therefore referred for hematology for further evaluation of the lymphocytosis.  She also has chronic kidney disease and her creatinine was 1.8 on 10/29/14.      14 - On initial evaluation in our office, WBC 12.2, hemoglobin 10.8, platelet count 311,000, lymphocyte count is almost 6,000.  The patient denied any fever, chills, night sweats, weight loss or lymph node swelling.  She does report having chronic anemia and also had received B12 injections in the past.  She reports history of mild chronic kidney disease.    • 14 - Creatinine 2.3, BUN 27, iron saturation 22%, TIBC 286, serum iron 64, ferritin 232, B12 566, reticulocyte count 1.3%.   • 14 - Flow cytometry:  Immunophenotyping identifies CD5 positive, CD23 positive, Monoclonal copper B cell population representing 24% of the total events.  CD 38 was negative.  This represents CLL.   • 1/22/15 - Bone marrow biopsy:  Variable cellular bone marrow with involvement by SLL/CLL in a nodular pattern.  Flow cytometry involvement 24% by CLL/SLL.  Nodular pattern of spread is considered a good prognostic finding.   Examination with PAS stain revealed several interstitial nodules which occupy 5% to 10% of the total marrow cellularity.  Cytogenetics showed normal karyotype.  CLL FISH  panel:  Negative.  Specific probes for TP53 on chromosome 17, NABIL on chromosome 11, chromosome 12 and chromosome 13 were negative.  This indicates good prognosis.    • 2/12/15 - WBC 11.1, hemoglobin 10.9, platelet count 360,000, MCV 98.6.   • 7/14/15 - WBC 9.2, hemoglobin 10.1, platelet count 306,000, MCV 94.1.  • 4/21/15 - Iron saturation 21%, TIBC 264,000, serum iron 56, ferritin 199, EPO level 6, reticulocyte count 1.6%, haptoglobin 124.    • 10/20/15 - WBC 10.7, hemoglobin 11.2, platelet count 348,000.  • 2/16/16 - Iron saturation 25%, TIBC 250, serum iron 63.  WBC 7.7, hemoglobin 10.9, platelet count 293,000, MCV 91.3, lymphocytes 3,900.  • 3/14/16 - Creatinine 1.48.  • 8/30/16 - Lymphocyte count 4,370.  Iron saturation 26%, TIBC 262, serum iron 67.    • 9/6/16 - WBC 11.5, hemoglobin 11.6, platelet count 334,000, MCV 93.6.  • 1/2/17 - WBC 7.2, hemoglobin 10.6, platelet count 310,000, MCV 97.2.    • 5/22/17 - WBC 8.9, hemoglobin 10.7, platelet count 294,000, MCV 99.7.  Absolute lymphocyte count 3.66 (H).  Iron 50, TIBC 205, iron saturation 20%.    • 3/27/18 - WBC 10.1, hemoglobin 11, MCV 96.7, platelet count 301,000.  Serum iron 80, iron saturation 31%, TIBC 256.  Creatinine 1.5, BUN 27.    • 9/20/18 - WBC 9, hemoglobin 10, platelet count 284,000, .5.  Absolute lymphocyte count 4250 (H).  Ferritin 351.  Iron 51, TIBC 197.  • 12/28/18 - Creatinine 1.7, BUN 31.  Calcium 10.7 (H), albumin 4.1.  WBC 8.7, hemoglobin 10.3, platelet count 375,000, .6.    • 1/8/19 - TSH 1.74.  Ferritin 379.  Iron saturation 14%, TIBC 261, serum iron 37 (L).  SPEP appears normal.  Serum immunofixation:  No monoclonal band.  PTH intact 63 (normal range 14-64).  Serum calcium 10.3 (N).    • 3/12/19 - WBC 7.6, hemoglobin 11.5, platelet count 312,000, .9.  Creatinine 1.38, BUN 22, calcium 10.9 (H).  Vitamin D level 49 (N).  • 7/22/2019 CT scan chest without contrast: There is a new mass, multilobulated within the left  lower lobe, measures 3.1 x 2.3 cm.  Adjacent scarring is present as well.  Evidence of prior granulomatous disease, no obvious mediastinal or hilar lymphadenopathy..  Aneurysm of the ascending thoracic aorta.  CT scan was done in the context of left rib cage pain after having a fall.  • 7/18/2019 MRI brain: Possible enhancing lesion seen only on coronal image 8 measures 4.4 mm within the right precentral gyrus.  Per the radiologist, this could be an old infarct or metastases or pulsation artifact.  No acute infarct.  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 She has started using a C-PAP machine recently. She complains of neuropathy to bilateral feet and can only wear sandals. She states that she has lost about 20 pounds in the past year. She continues po iron twice daily. She had a recent fall and she complained of sore ribs and a CT chest was done on 7/22/19, which showed a lung mass. She used to smoke 2 ppd off and on X 20 years, but quit around 2011. She saw Dr. Mayo, cardiology last week and he made her an appointment with Dr. Rivera on September 5th for the lung mass.  Clinically patient does not have any signs or symptoms of headaches or any CNS symptoms.  She does not have any respiratory symptoms at this time.  She is a former smoker.        The following portions of the patient's history were reviewed and updated as appropriate: allergies, current medications, past family history, past medical history, past social history, past surgical history and problem list.         Past Medical History:   Diagnosis Date   • Anemia of renal disease 6/27/2019   • Arthritis    • Cancer (CMS/HCC)    • Chronic back pain    • Chronic kidney disease (CKD)    • Depression    • Elevated cholesterol    • Hypertension    • Migraine    • Polypharmacy 6/27/2019   • Protein-calorie malnutrition, moderate (CMS/HCC) 6/27/2019   • Restless leg syndrome    • Stroke (CMS/HCC)        Past Surgical  History:   Procedure Laterality Date   • APPENDECTOMY     • CHOLECYSTECTOMY     • HYSTERECTOMY     • JOINT REPLACEMENT     • OOPHORECTOMY     • SHOULDER SURGERY Left          Current Outpatient Medications:   •  amLODIPine (NORVASC) 5 MG tablet, Take 5 mg by mouth Daily., Disp: , Rfl:   •  Biotin 5000 MCG capsule, Take  by mouth Daily., Disp: , Rfl:   •  Cranberry-Cholecalciferol 4200-500 MG-UNIT capsule, Take  by mouth Daily., Disp: , Rfl:   •  cyclobenzaprine (FLEXERIL) 10 MG tablet, Take 10 mg by mouth 3 (Three) Times a Day., Disp: , Rfl: 2  •  docusate sodium (COLACE) 100 MG capsule, Take 100 mg by mouth Daily., Disp: , Rfl:   •  DULoxetine (CYMBALTA) 60 MG capsule, Take 60 mg by mouth Daily., Disp: , Rfl:   •  EPINEPHrine (EPIPEN) 0.3 MG/0.3ML solution auto-injector injection, Daily., Disp: , Rfl:   •  fentaNYL (DURAGESIC) 50 MCG/HR patch, Place 1 patch on the skin as directed by provider Every 72 (Seventy-Two) Hours., Disp: , Rfl:   •  ferrous sulfate 325 (65 FE) MG tablet, Take 325 mg by mouth Daily With Breakfast., Disp: , Rfl:   •  fluticasone (FLONASE) 50 MCG/ACT nasal spray, INHALE TWO SPRAYS EACH NOSTRIL EVERY DAY, Disp: , Rfl: 1  •  furosemide (LASIX) 20 MG tablet, Take 20 mg by mouth., Disp: , Rfl: 3  •  hydrALAZINE (APRESOLINE) 25 MG tablet, Take 25 mg by mouth 3 (Three) Times a Day., Disp: , Rfl:   •  levothyroxine (SYNTHROID, LEVOTHROID) 50 MCG tablet, Take 50 mcg by mouth Daily., Disp: , Rfl:   •  lidocaine (XYLOCAINE,URO-JET) 2 % gel, apply TO mucosal membrane THREE TIMES DAILY AS NEEDED AS directed, Disp: , Rfl: 0  •  LORazepam (ATIVAN) 0.5 MG tablet, Take 0.5 mg by mouth 2 (Two) Times a Day., Disp: , Rfl: 1  •  lubiprostone (AMITIZA) 24 MCG capsule, Take 24 mcg by mouth 2 (Two) Times a Day With Meals., Disp: , Rfl:   •  meclizine 25 MG chewable tablet chewable tablet, Chew 25 mg 2 (Two) Times a Day As Needed., Disp: , Rfl:   •  melatonin 5 MG tablet tablet, Take 5 mg by mouth At Night As  Needed., Disp: , Rfl:   •  memantine (NAMENDA XR) 7 MG capsule sustained-release 24 hr extended release capsule, Take  by mouth Daily., Disp: , Rfl:   •  Naloxegol Oxalate (MOVANTIK) 25 MG tablet, Take 25 mg by mouth Every Morning., Disp: , Rfl:   •  nitrofurantoin, macrocrystal-monohydrate, (MACROBID) 100 MG capsule, Take 100 mg by mouth 4 (Four) Times a Day., Disp: , Rfl: 5  •  omeprazole (priLOSEC) 40 MG capsule, Take 40 mg by mouth 2 (Two) Times a Day., Disp: , Rfl:   •  oxybutynin XL (DITROPAN-XL) 10 MG 24 hr tablet, Take 10 mg by mouth Daily., Disp: , Rfl:   •  polyethylene glycol (MIRALAX) packet, Take 17 g by mouth Daily., Disp: , Rfl:   •  potassium chloride (K-DUR) 10 MEQ CR tablet, Take 10 mEq by mouth 2 (Two) Times a Day., Disp: , Rfl: 2  •  predniSONE (DELTASONE) 5 MG tablet, Take 5 mg by mouth Daily., Disp: , Rfl:   •  RESTASIS 0.05 % ophthalmic emulsion, 2 (Two) Times a Day., Disp: , Rfl: 6  •  rOPINIRole (REQUIP) 1 MG tablet, Take 1 mg by mouth 3 (Three) Times a Day. Take 1 hour before bedtime., Disp: , Rfl:   •  sertraline (ZOLOFT) 100 MG tablet, Take 100 mg by mouth Daily., Disp: , Rfl:   •  SUMAtriptan (IMITREX) 25 MG tablet, As Needed., Disp: , Rfl: 0  •  vitamin B-12 (CYANOCOBALAMIN) 1000 MCG tablet, Take 1,000 mcg by mouth Daily., Disp: , Rfl:   •  NAMZARIC 28-10 MG capsule sustained-release 24 hr, Take 1 capsule by mouth Daily., Disp: , Rfl: 3    Allergies   Allergen Reactions   • Inderal  [Propranolol] Unknown (See Comments)   • L-Methylfolate-Algae-B12-B6 Unknown (See Comments)   • Morphine Nausea And Vomiting   • Rofecoxib Other (See Comments)   • Topiramate Other (See Comments)       Family History   Problem Relation Age of Onset   • Breast cancer Maternal Aunt    • Leukemia Maternal Aunt        Cancer-related family history includes Breast cancer in her maternal aunt.    Social History     Tobacco Use   • Smoking status: Former Smoker     Packs/day: 1.50     Years: 30.00     Pack years:  "45.00     Types: Cigarettes     Last attempt to quit: 2014     Years since quittin.1   • Smokeless tobacco: Never Used   Substance Use Topics   • Alcohol use: No     Frequency: Never   • Drug use: No         I have reviewed the history of present illness, past medical history, family history, social history, lab results, all notes and other records since the patient was last seen on  Visit date not found.    ROS:     Review of Systems   Constitutional: Positive for unexpected weight change (have lost #20 pounds in the past year). Negative for chills and fever.   HENT: Negative for ear pain, mouth sores, nosebleeds and sore throat.    Eyes: Negative for photophobia and visual disturbance.   Respiratory: Negative for wheezing and stridor.    Cardiovascular: Negative for chest pain and palpitations.   Gastrointestinal: Negative for abdominal pain, diarrhea, nausea and vomiting.   Endocrine: Negative for cold intolerance and heat intolerance.   Genitourinary: Negative for dysuria and hematuria.   Musculoskeletal: Negative for joint swelling and neck stiffness.   Skin: Negative for color change and rash.   Neurological: Negative for seizures and syncope.   Hematological: Negative for adenopathy.        No obvious bleeding   Psychiatric/Behavioral: Negative for agitation, confusion, hallucinations and sleep disturbance.       Objective:    Vitals:    19 1141   BP: 163/54   Pulse: 75   Resp: 18   Temp: 98.3 °F (36.8 °C)   Weight: 48.7 kg (107 lb 6.4 oz)   Height: 162.6 cm (64\")   PainSc: 0-No pain     ECOG  (1) Restricted in physically strenuous activity, ambulatory and able to do work of light nature    Physical Exam:   Physical Exam   Constitutional: She is oriented to person, place, and time. She appears well-developed and well-nourished. No distress.   HENT:   Head: Normocephalic and atraumatic.   Eyes: Conjunctivae and EOM are normal. Right eye exhibits no discharge. Left eye exhibits no discharge. No " scleral icterus.   Neck: Normal range of motion. Neck supple. No thyromegaly present.   Cardiovascular: Normal rate, regular rhythm and normal heart sounds. Exam reveals no gallop and no friction rub.   Pulmonary/Chest: Effort normal. No respiratory distress. She has no wheezes.   Decreased air entry bilaterally in the lungs.   Abdominal: Soft. Bowel sounds are normal. She exhibits no mass. There is no tenderness. There is no rebound and no guarding.   Musculoskeletal: Normal range of motion. She exhibits no tenderness.   Lymphadenopathy:     She has no cervical adenopathy.   Neurological: She is alert and oriented to person, place, and time. She exhibits normal muscle tone.   Skin: Skin is warm. No rash noted. She is not diaphoretic. No erythema.   Psychiatric: She has a normal mood and affect. Her behavior is normal.   Nursing note and vitals reviewed.            Lab Results - Last 18 Months   Lab Units 08/30/19  1150 06/27/19  0322 06/27/19  0044   WBC 10*3/mm3 6.99 10.90* 9.60   HEMOGLOBIN g/dL 9.8* 9.6* 10.8*   HEMATOCRIT % 30.2* 28.1* 32.2*   PLATELETS 10*3/mm3 271 258 283   MCV fL 102.0* 96.5 97.1*     Lab Results - Last 18 Months   Lab Units 06/27/19  0322 06/27/19  0044   SODIUM mmol/L 136 135*   POTASSIUM mmol/L 3.8 3.8   CHLORIDE mmol/L 101 97*   CO2 mmol/L 25.0 25.0   BUN mg/dL 25* 24*   CREATININE mg/dL 1.50* 1.50*   CALCIUM mg/dL 9.5 9.8   BILIRUBIN mg/dL  --  0.6   ALK PHOS U/L  --  63   ALT (SGPT) U/L  --  18   AST (SGOT) U/L  --  23   GLUCOSE mg/dL 117* 119*       Lab Results   Component Value Date    GLUCOSE 117 (H) 06/27/2019    BUN 25 (H) 06/27/2019    CREATININE 1.50 (H) 06/27/2019    EGFRIFNONA 33 (L) 06/27/2019    BCR 16.7 06/27/2019    K 3.8 06/27/2019    CO2 25.0 06/27/2019    CALCIUM 9.5 06/27/2019    ALBUMIN 4.20 06/27/2019    AST 23 06/27/2019    ALT 18 06/27/2019       No results for input(s): APTT, INR, PTT in the last 11579 hours.    No results found for: IRON, TIBC, FERRITIN    No  results found for: FOLATE    No results found for: OCCULTBLD    No results found for: RETICCTPCT    No results found for: [id number]  No results found for: SPEP, UPEP  No results found for: LDH, URICACID  No results found for: JOSAFAT, RF, SEDRATE  No results found for: FIBRINOGEN, HAPTOGLOBIN  No results found for: PTT, INR  No results found for:   No results found for: CEA  No components found for: CA-19-9  No results found for: PSA          Assessment/Plan     Assessment;  1. Left lower lobe lung lesion: This was noted incidentally on recent CT scan.  Pulmonary consulted.  Dr. Rivera will be doing bronchoscopy.  Patient has a history of previous tobacco use.  The mass appears somewhat atypical for lung cancer.  2. Chronic lymphocytic leukemia:  The patient does not have any B symptoms or lymph node swelling.  She is EMERSON Stage 0.  Her bone marrow biopsy previously showed nodular involvement by CLL with nodular deposits occupying 5-10% of marrow cellularity.  FISH panel was negative.  Cytogenetics showed normal karyotype.  Overall, patient did not have any poor prognostic features.  Recent CBC shows a stable white count.  Platelet count is also normal and physical exam does not reveal any lymphadenopathy and she does not have any B symptoms.    3. Enhancing lesion and brain: Only 4.4 mm.  It does not appear suspicious for metastasis.  4. Anemia of chronic kidney disease:  Her hemoglobin remains above 10.  She also had iron deficiency and is on iron supplements.   Iron stores are adequate.  She has been taking two iron tablets daily.    5. Hypercalcemia:  PTH is in the high normal range.  Patient has CKD.  She is also taking vitamin D plus calcium supplements.  Vitamin D level is normal.  Her high calcium could be due to calcium supplements as well.   6. History of mild iron deficiency.  She is on iron supplement.    7. Skin lesions:  She is seeing a dermatologist.  She had a skin biopsy.  She also reports having  skin cancer excised on her right lower extremity with Dr. Buckley.    8. History of dizziness:  She had a thorough work up.  She saw cardiology.  She also saw an ophthalmologist.  She had a brain MRI which was negative.  9. History of headaches:  She has history of migraines.      PLAN:    1. Hemoglobin has dropped.  We will repeat anemia work-up labs ordered  2. Pulmonary consulted.  Will need bronchoscopy.  I discussed the case with Dr. Rivera.  3. At this point I am not planning to repeat the brain MRI as the lesion reported could be an artifact or nonspecific.  However if lung cancer is diagnosed, we will consider a higher resolution MRI..   4. Regarding hypercalcemia, advised patient told hold off her vitamin D and calcium supplements.  She needs to see her nephrologist regarding this problem for further input.    5. Follow-up in 3 weeks        CLL (chronic lymphocytic leukemia) (CMS/[facility])  - CBC Auto Differential  - Iron Profile  - Ferritin  - Vitamin B12  - Folate  - CBC & Differential    Anemia of chronic kidney failure, unspecified stage  - Iron Profile  - Ferritin  - Vitamin B12  - Folate  - CBC & Differential    Orders Placed This Encounter   Procedures   • CBC Auto Differential     collect LABS TODAY     Scheduling Instructions:      collect LABS TODAY   • Iron Profile     Labs today     Scheduling Instructions:      Labs today   • Ferritin     Labs today     Scheduling Instructions:      Labs today   • Vitamin B12     Labs today     Scheduling Instructions:      Labs today   • Folate   • CBC & Differential     Please draw these labs prior to the next visit.     Standing Status:   Future     Scheduling Instructions:      Please draw these labs prior to the next visit.     Order Specific Question:   Manual Differential     Answer:   No                     Thank you very much for providing the opportunity to participate in this patient’s care. Please do not hesitate to call if there are any other questions.    I  have reviewed all relevant labs results, imaging,Outside reports,notes, vitals, medications and plan with the patient today.    Portions of the note have been Scribed by medical assistant/Nurse.  I have reviewed and made changes accordingly.

## 2019-08-30 ENCOUNTER — APPOINTMENT (OUTPATIENT)
Dept: LAB | Facility: HOSPITAL | Age: 79
End: 2019-08-30

## 2019-08-30 ENCOUNTER — OFFICE VISIT (OUTPATIENT)
Dept: ONCOLOGY | Facility: CLINIC | Age: 79
End: 2019-08-30

## 2019-08-30 VITALS
RESPIRATION RATE: 18 BRPM | TEMPERATURE: 98.3 F | SYSTOLIC BLOOD PRESSURE: 163 MMHG | HEART RATE: 75 BPM | WEIGHT: 107.4 LBS | DIASTOLIC BLOOD PRESSURE: 54 MMHG | BODY MASS INDEX: 18.34 KG/M2 | HEIGHT: 64 IN

## 2019-08-30 DIAGNOSIS — C34.32 PRIMARY CANCER OF LEFT LOWER LOBE OF LUNG (HCC): ICD-10-CM

## 2019-08-30 DIAGNOSIS — N18.9 ANEMIA OF CHRONIC KIDNEY FAILURE, UNSPECIFIED STAGE: ICD-10-CM

## 2019-08-30 DIAGNOSIS — C91.10 CLL (CHRONIC LYMPHOCYTIC LEUKEMIA) (HCC): Primary | ICD-10-CM

## 2019-08-30 DIAGNOSIS — Z86.39 HISTORY OF IRON DEFICIENCY: ICD-10-CM

## 2019-08-30 DIAGNOSIS — D63.1 ANEMIA OF CHRONIC KIDNEY FAILURE, UNSPECIFIED STAGE: ICD-10-CM

## 2019-08-30 LAB
BASOPHILS # BLD AUTO: 0.07 10*3/MM3 (ref 0–0.2)
BASOPHILS NFR BLD AUTO: 1 % (ref 0–1.5)
DEPRECATED RDW RBC AUTO: 43.9 FL (ref 37–54)
EOSINOPHIL # BLD AUTO: 0.15 10*3/MM3 (ref 0–0.4)
EOSINOPHIL NFR BLD AUTO: 2.1 % (ref 0.3–6.2)
ERYTHROCYTE [DISTWIDTH] IN BLOOD BY AUTOMATED COUNT: 12.2 % (ref 12.3–15.4)
FERRITIN SERPL-MCNC: 295 NG/ML (ref 11–307)
FOLATE SERPL-MCNC: 9.6 NG/ML (ref 5.9–24.8)
HCT VFR BLD AUTO: 30.2 % (ref 34–46.6)
HGB BLD-MCNC: 9.8 G/DL (ref 12–15.9)
IRON 24H UR-MRATE: 45 MCG/DL (ref 28–170)
IRON SATN MFR SERPL: 16 % (ref 15–50)
LYMPHOCYTES # BLD AUTO: 1.78 10*3/MM3 (ref 0.7–3.1)
LYMPHOCYTES NFR BLD AUTO: 25.5 % (ref 19.6–45.3)
MCH RBC QN AUTO: 33.1 PG (ref 26.6–33)
MCHC RBC AUTO-ENTMCNC: 32.5 G/DL (ref 31.5–35.7)
MCV RBC AUTO: 102 FL (ref 79–97)
MONOCYTES # BLD AUTO: 0.48 10*3/MM3 (ref 0.1–0.9)
MONOCYTES NFR BLD AUTO: 6.9 % (ref 5–12)
NEUTROPHILS # BLD AUTO: 4.51 10*3/MM3 (ref 1.7–7)
NEUTROPHILS NFR BLD AUTO: 64.5 % (ref 42.7–76)
PLATELET # BLD AUTO: 271 10*3/MM3 (ref 140–450)
PMV BLD AUTO: 8.9 FL (ref 6–12)
RBC # BLD AUTO: 2.96 10*6/MM3 (ref 3.77–5.28)
TIBC SERPL-MCNC: 277 MCG/DL (ref 228–428)
TRANSFERRIN SERPL-MCNC: 198 MG/DL (ref 200–360)
VIT B12 BLD-MCNC: 889 PG/ML (ref 180–914)
WBC NRBC COR # BLD: 6.99 10*3/MM3 (ref 3.4–10.8)

## 2019-08-30 PROCEDURE — 99215 OFFICE O/P EST HI 40 MIN: CPT | Performed by: INTERNAL MEDICINE

## 2019-08-30 PROCEDURE — 85025 COMPLETE CBC W/AUTO DIFF WBC: CPT | Performed by: INTERNAL MEDICINE

## 2019-08-30 PROCEDURE — 83540 ASSAY OF IRON: CPT | Performed by: NURSE PRACTITIONER

## 2019-08-30 PROCEDURE — 36415 COLL VENOUS BLD VENIPUNCTURE: CPT | Performed by: INTERNAL MEDICINE

## 2019-08-30 PROCEDURE — 84466 ASSAY OF TRANSFERRIN: CPT | Performed by: NURSE PRACTITIONER

## 2019-08-30 PROCEDURE — 82607 VITAMIN B-12: CPT | Performed by: NURSE PRACTITIONER

## 2019-08-30 PROCEDURE — 82746 ASSAY OF FOLIC ACID SERUM: CPT | Performed by: NURSE PRACTITIONER

## 2019-08-30 PROCEDURE — 82728 ASSAY OF FERRITIN: CPT | Performed by: NURSE PRACTITIONER

## 2019-08-30 RX ORDER — LIDOCAINE HYDROCHLORIDE 20 MG/ML
JELLY TOPICAL
Refills: 0 | COMMUNITY
Start: 2019-06-04 | End: 2020-03-10

## 2019-08-30 RX ORDER — LORAZEPAM 0.5 MG/1
0.5 TABLET ORAL AS NEEDED
Refills: 1 | COMMUNITY
Start: 2019-08-06 | End: 2019-12-13

## 2019-08-30 RX ORDER — MEMANTINE HYDROCHLORIDE AND DONEPEZIL HYDROCHLORIDE 28; 10 MG/1; MG/1
1 CAPSULE ORAL DAILY
Refills: 3 | COMMUNITY
Start: 2019-08-19 | End: 2020-01-14 | Stop reason: SDUPTHER

## 2019-08-30 RX ORDER — FLUTICASONE PROPIONATE 50 MCG
SPRAY, SUSPENSION (ML) NASAL
Refills: 1 | COMMUNITY
Start: 2019-08-27 | End: 2019-12-17 | Stop reason: SDUPTHER

## 2019-09-09 ENCOUNTER — HOSPITAL ENCOUNTER (OUTPATIENT)
Dept: OTHER | Facility: HOSPITAL | Age: 79
Discharge: HOME | End: 2019-09-09

## 2019-09-09 DIAGNOSIS — Z00.6 EXAMINATION FOR NORMAL COMPARISON OR CONTROL IN CLINICAL RESEARCH: ICD-10-CM

## 2019-09-12 ENCOUNTER — HOSPITAL ENCOUNTER (OUTPATIENT)
Dept: GENERAL RADIOLOGY | Facility: HOSPITAL | Age: 79
Discharge: HOME | End: 2019-09-12

## 2019-09-12 ENCOUNTER — HOSPITAL ENCOUNTER (OUTPATIENT)
Dept: CT IMAGING | Facility: HOSPITAL | Age: 79
Discharge: HOME | End: 2019-09-12
Admitting: RADIOLOGY

## 2019-09-12 VITALS
DIASTOLIC BLOOD PRESSURE: 45 MMHG | OXYGEN SATURATION: 95 % | BODY MASS INDEX: 18.16 KG/M2 | WEIGHT: 102.51 LBS | SYSTOLIC BLOOD PRESSURE: 162 MMHG | HEIGHT: 63 IN | RESPIRATION RATE: 12 BRPM | HEART RATE: 69 BPM | TEMPERATURE: 98.4 F

## 2019-09-12 DIAGNOSIS — R91.8 MASS OF LOWER LOBE OF LEFT LUNG: ICD-10-CM

## 2019-09-12 LAB
APTT PPP: 20.5 SECONDS (ref 24–31)
BASOPHILS # BLD AUTO: 0.1 10*3/MM3 (ref 0–0.2)
BASOPHILS NFR BLD AUTO: 1.1 % (ref 0–1.5)
DEPRECATED RDW RBC AUTO: 43.3 FL (ref 37–54)
EOSINOPHIL # BLD AUTO: 0.2 10*3/MM3 (ref 0–0.4)
EOSINOPHIL NFR BLD AUTO: 2.8 % (ref 0.3–6.2)
ERYTHROCYTE [DISTWIDTH] IN BLOOD BY AUTOMATED COUNT: 12.6 % (ref 12.3–15.4)
HCT VFR BLD AUTO: 31.7 % (ref 34–46.6)
HGB BLD-MCNC: 10.6 G/DL (ref 12–15.9)
INR PPP: 0.96 (ref 0.9–1.1)
LYMPHOCYTES # BLD AUTO: 3.1 10*3/MM3 (ref 0.7–3.1)
LYMPHOCYTES NFR BLD AUTO: 35.4 % (ref 19.6–45.3)
MCH RBC QN AUTO: 32.4 PG (ref 26.6–33)
MCHC RBC AUTO-ENTMCNC: 33.5 G/DL (ref 31.5–35.7)
MCV RBC AUTO: 96.9 FL (ref 79–97)
MONOCYTES # BLD AUTO: 0.7 10*3/MM3 (ref 0.1–0.9)
MONOCYTES NFR BLD AUTO: 8 % (ref 5–12)
NEUTROPHILS # BLD AUTO: 4.6 10*3/MM3 (ref 1.7–7)
NEUTROPHILS NFR BLD AUTO: 52.7 % (ref 42.7–76)
NRBC BLD AUTO-RTO: 0.1 /100 WBC (ref 0–0.2)
PLATELET # BLD AUTO: 286 10*3/MM3 (ref 140–450)
PMV BLD AUTO: 6.8 FL (ref 6–12)
PROTHROMBIN TIME: 9.9 SECONDS (ref 9.6–11.7)
RBC # BLD AUTO: 3.27 10*6/MM3 (ref 3.77–5.28)
WBC NRBC COR # BLD: 8.7 10*3/MM3 (ref 3.4–10.8)

## 2019-09-12 PROCEDURE — 71045 X-RAY EXAM CHEST 1 VIEW: CPT

## 2019-09-12 PROCEDURE — 63710000001 AMLODIPINE 5 MG TABLET: Performed by: RADIOLOGY

## 2019-09-12 PROCEDURE — 88341 IMHCHEM/IMCYTCHM EA ADD ANTB: CPT | Performed by: INTERNAL MEDICINE

## 2019-09-12 PROCEDURE — 77012 CT SCAN FOR NEEDLE BIOPSY: CPT

## 2019-09-12 PROCEDURE — A9270 NON-COVERED ITEM OR SERVICE: HCPCS | Performed by: RADIOLOGY

## 2019-09-12 PROCEDURE — 25010000002 ONDANSETRON PER 1 MG: Performed by: RADIOLOGY

## 2019-09-12 PROCEDURE — 25010000002 FENTANYL CITRATE (PF) 100 MCG/2ML SOLUTION: Performed by: RADIOLOGY

## 2019-09-12 PROCEDURE — 85025 COMPLETE CBC W/AUTO DIFF WBC: CPT | Performed by: RADIOLOGY

## 2019-09-12 PROCEDURE — 99153 MOD SED SAME PHYS/QHP EA: CPT

## 2019-09-12 PROCEDURE — 85730 THROMBOPLASTIN TIME PARTIAL: CPT | Performed by: RADIOLOGY

## 2019-09-12 PROCEDURE — 25010000002 MIDAZOLAM PER 1 MG: Performed by: RADIOLOGY

## 2019-09-12 PROCEDURE — 88305 TISSUE EXAM BY PATHOLOGIST: CPT | Performed by: INTERNAL MEDICINE

## 2019-09-12 PROCEDURE — 85610 PROTHROMBIN TIME: CPT | Performed by: RADIOLOGY

## 2019-09-12 PROCEDURE — 63710000001 HYDRALAZINE 25 MG TABLET: Performed by: RADIOLOGY

## 2019-09-12 PROCEDURE — 99152 MOD SED SAME PHYS/QHP 5/>YRS: CPT

## 2019-09-12 PROCEDURE — 88342 IMHCHEM/IMCYTCHM 1ST ANTB: CPT | Performed by: INTERNAL MEDICINE

## 2019-09-12 PROCEDURE — 88333 PATH CONSLTJ SURG CYTO XM 1: CPT | Performed by: INTERNAL MEDICINE

## 2019-09-12 RX ORDER — HYDRALAZINE HYDROCHLORIDE 25 MG/1
25 TABLET, FILM COATED ORAL ONCE
Status: DISCONTINUED | OUTPATIENT
Start: 2019-09-12 | End: 2019-09-13 | Stop reason: HOSPADM

## 2019-09-12 RX ORDER — FENTANYL CITRATE 50 UG/ML
INJECTION, SOLUTION INTRAMUSCULAR; INTRAVENOUS
Status: COMPLETED | OUTPATIENT
Start: 2019-09-12 | End: 2019-09-12

## 2019-09-12 RX ORDER — AMLODIPINE BESYLATE 5 MG/1
5 TABLET ORAL
Status: DISCONTINUED | OUTPATIENT
Start: 2019-09-12 | End: 2019-09-12

## 2019-09-12 RX ORDER — SODIUM CHLORIDE 0.9 % (FLUSH) 0.9 %
3 SYRINGE (ML) INJECTION EVERY 12 HOURS SCHEDULED
Status: DISCONTINUED | OUTPATIENT
Start: 2019-09-12 | End: 2019-09-12 | Stop reason: HOSPADM

## 2019-09-12 RX ORDER — HYDRALAZINE HYDROCHLORIDE 25 MG/1
25 TABLET, FILM COATED ORAL ONCE
Status: DISCONTINUED | OUTPATIENT
Start: 2019-09-12 | End: 2019-09-12

## 2019-09-12 RX ORDER — ONDANSETRON 2 MG/ML
INJECTION INTRAMUSCULAR; INTRAVENOUS
Status: COMPLETED | OUTPATIENT
Start: 2019-09-12 | End: 2019-09-12

## 2019-09-12 RX ORDER — AMLODIPINE BESYLATE 5 MG/1
5 TABLET ORAL
Status: DISCONTINUED | OUTPATIENT
Start: 2019-09-12 | End: 2019-09-13 | Stop reason: HOSPADM

## 2019-09-12 RX ORDER — MIDAZOLAM HYDROCHLORIDE 1 MG/ML
INJECTION INTRAMUSCULAR; INTRAVENOUS
Status: COMPLETED | OUTPATIENT
Start: 2019-09-12 | End: 2019-09-12

## 2019-09-12 RX ORDER — SODIUM CHLORIDE 0.9 % (FLUSH) 0.9 %
3-10 SYRINGE (ML) INJECTION AS NEEDED
Status: DISCONTINUED | OUTPATIENT
Start: 2019-09-12 | End: 2019-09-12 | Stop reason: HOSPADM

## 2019-09-12 RX ADMIN — AMLODIPINE BESYLATE 5 MG: 5 TABLET ORAL at 09:48

## 2019-09-12 RX ADMIN — HYDRALAZINE HYDROCHLORIDE 25 MG: 25 TABLET, FILM COATED ORAL at 09:49

## 2019-09-12 RX ADMIN — MIDAZOLAM 1 MG: 1 INJECTION INTRAMUSCULAR; INTRAVENOUS at 08:55

## 2019-09-12 RX ADMIN — FENTANYL CITRATE 50 MCG: 50 INJECTION, SOLUTION INTRAMUSCULAR; INTRAVENOUS at 08:55

## 2019-09-12 RX ADMIN — FENTANYL CITRATE 50 MCG: 50 INJECTION, SOLUTION INTRAMUSCULAR; INTRAVENOUS at 09:03

## 2019-09-12 RX ADMIN — ONDANSETRON 4 MG: 2 INJECTION INTRAMUSCULAR; INTRAVENOUS at 08:50

## 2019-09-12 NOTE — NURSING NOTE
Local dressing of 2x2, betadine, and tegaderm applied to left back. Dressing is dry and intact at this time.

## 2019-09-12 NOTE — DISCHARGE INSTRUCTIONS
A responsible adult should stay with you and you should rest quietly for the rest of the day. Do not drink alcohol, drive or cook for 24 hours following your procedure.  Progress your diet as tolerated.  Resume your usually medications including aspirin.  When you remove your dressing in 48 hours, a small amount of blood is to be expected. Do not be alarmed.  If you feel it is bleeding excessively apply pressure and proceed to the Emergency room.  Do not shower, bath, or get your dressing wet at all for 48 hours.  You may shower after the dressing is removed. No lifting more that 10 pounds for 48 hours.  If severe pain, increased shortness of air or racing heartbeat occur, seek immediate medical attention.  Follow up with Dr. Rivera for results.

## 2019-09-12 NOTE — NURSING NOTE
Dr. Bray has needle in place and is obtaining initial core biopsy sample for pathologist, Dr. Nix.

## 2019-09-12 NOTE — NURSING NOTE
Post procedure CXR completed and Dr. Bray reviewed; no PTX at this time. Pt. Transferred to post op area in stable condition on room air. Pt. Is alert and oriented x3. Dressing is dry and intact.

## 2019-09-12 NOTE — NURSING NOTE
Pt moved self from stretcher onto CT exam table into prone position, head first into the scanner. Pt remains on heart, blood pressure, and oxygen sat monitoring. Pt. Placed on 2L oxygen via N/C for procedure.

## 2019-09-12 NOTE — NURSING NOTE
Dr. Bray is injecting blood patch and removed needle. Final image using CT flouro taken by Dr. Bray.

## 2019-09-12 NOTE — NURSING NOTE
Dr. Bray obtained additional core biopsy samples for pathologist. See MD dictation for procedural specific information.  Pt resting comfortably and is tolerating procedure well.

## 2019-09-20 NOTE — PROGRESS NOTES
HEMATOLOGY ONCOLOGY FOLLOW UP        Patient name: Ann Andre  : 1940  MRN: [id number]  Primary Care Physician: Rochelle Fletcher MD  Referring Physician: Rochelle Fletcher, *  Reason For Consult:     No chief complaint on file.      History of Present Illness:  Ms. Andre is a pleasant 78-year-old female who underwent lab work at her Primary Care Physician’s office on 10/27/14 that showed leukocytosis with a white count of 12.1 and normal hemoglobin and platelet count.  Her absolute lymphocyte count is 8,000 (elevated).  ESR was normal at 6.  The patient was therefore referred for hematology for further evaluation of the lymphocytosis.  She also has chronic kidney disease and her creatinine was 1.8 on 10/29/14.      14 - On initial evaluation in our office, WBC 12.2, hemoglobin 10.8, platelet count 311,000, lymphocyte count is almost 6,000.  The patient denied any fever, chills, night sweats, weight loss or lymph node swelling.  She does report having chronic anemia and also had received B12 injections in the past.  She reports history of mild chronic kidney disease.    • 14 – Creatinine 2.3, BUN 27, iron saturation 22%, TIBC 286, serum iron 64, ferritin 232, B12 566, reticulocyte count 1.3%.   • 14 – Flow cytometry:  Immunophenotyping identifies CD5 positive, CD23 positive, Monoclonal copper B cell population representing 24% of the total events.  CD 38 was negative.  This represents CLL.   • 1/22/15 – Bone marrow biopsy:  Variable cellular bone marrow with involvement by SLL/CLL in a nodular pattern.  Flow cytometry involvement 24% by CLL/SLL.  Nodular pattern of spread is considered a good prognostic finding.   Examination with PAS stain revealed several interstitial nodules which occupy 5% to 10% of the total marrow cellularity.  Cytogenetics showed normal karyotype.  CLL FISH panel:  Negative.  Specific probes for TP53 on chromosome 17, NABIL on chromosome  11, chromosome 12 and chromosome 13 were negative.  This indicates good prognosis.    • 2/12/15 - WBC 11.1, hemoglobin 10.9, platelet count 360,000, MCV 98.6.   • 7/14/15 – WBC 9.2, hemoglobin 10.1, platelet count 306,000, MCV 94.1.  • 4/21/15 – Iron saturation 21%, TIBC 264,000, serum iron 56, ferritin 199, EPO level 6, reticulocyte count 1.6%, haptoglobin 124.    • 10/20/15 – WBC 10.7, hemoglobin 11.2, platelet count 348,000.  • 2/16/16 – Iron saturation 25%, TIBC 250, serum iron 63.  WBC 7.7, hemoglobin 10.9, platelet count 293,000, MCV 91.3, lymphocytes 3,900.  • 3/14/16 – Creatinine 1.48.  • 8/30/16 – Lymphocyte count 4,370.  Iron saturation 26%, TIBC 262, serum iron 67.    • 9/6/16 – WBC 11.5, hemoglobin 11.6, platelet count 334,000, MCV 93.6.  • 1/2/17 – WBC 7.2, hemoglobin 10.6, platelet count 310,000, MCV 97.2.    • 5/22/17 – WBC 8.9, hemoglobin 10.7, platelet count 294,000, MCV 99.7.  Absolute lymphocyte count 3.66 (H).  Iron 50, TIBC 205, iron saturation 20%.    • 3/27/18 – WBC 10.1, hemoglobin 11, MCV 96.7, platelet count 301,000.  Serum iron 80, iron saturation 31%, TIBC 256.  Creatinine 1.5, BUN 27.    • 9/20/18 – WBC 9, hemoglobin 10, platelet count 284,000, .5.  Absolute lymphocyte count 4250 (H).  Ferritin 351.  Iron 51, TIBC 197.  • 12/28/18 – Creatinine 1.7, BUN 31.  Calcium 10.7 (H), albumin 4.1.  WBC 8.7, hemoglobin 10.3, platelet count 375,000, .6.    • 1/8/19 – TSH 1.74.  Ferritin 379.  Iron saturation 14%, TIBC 261, serum iron 37 (L).  SPEP appears normal.  Serum immunofixation:  No monoclonal band.  PTH intact 63 (normal range 14-64).  Serum calcium 10.3 (N).    • 3/12/19 – WBC 7.6, hemoglobin 11.5, platelet count 312,000, .9.  Creatinine 1.38, BUN 22, calcium 10.9 (H).  Vitamin D level 49 (N).              Subjective:09/24/19            The following portions of the patient's history were reviewed and updated as appropriate: allergies, current medications, past family  history, past medical history, past social history, past surgical history and problem list.         Past Medical History:   Diagnosis Date   • Anemia of renal disease 6/27/2019   • Arthritis    • Cancer (CMS/HCC)    • Chronic back pain    • Chronic kidney disease (CKD)    • Depression    • Elevated cholesterol    • Hypertension    • Migraine    • Polypharmacy 6/27/2019   • Protein-calorie malnutrition, moderate (CMS/HCC) 6/27/2019   • Restless leg syndrome    • Stroke (CMS/HCC)        Past Surgical History:   Procedure Laterality Date   • APPENDECTOMY     • CHOLECYSTECTOMY     • HYSTERECTOMY     • JOINT REPLACEMENT     • OOPHORECTOMY     • SHOULDER SURGERY Left          Current Outpatient Medications:   •  amLODIPine (NORVASC) 5 MG tablet, Take 5 mg by mouth Daily., Disp: , Rfl:   •  Biotin 5000 MCG capsule, Take  by mouth Daily., Disp: , Rfl:   •  Cranberry-Cholecalciferol 4200-500 MG-UNIT capsule, Take  by mouth Daily., Disp: , Rfl:   •  cyclobenzaprine (FLEXERIL) 10 MG tablet, Take 10 mg by mouth 3 (Three) Times a Day., Disp: , Rfl: 2  •  docusate sodium (COLACE) 100 MG capsule, Take 100 mg by mouth Daily., Disp: , Rfl:   •  DULoxetine (CYMBALTA) 60 MG capsule, Take 60 mg by mouth Daily., Disp: , Rfl:   •  EPINEPHrine (EPIPEN) 0.3 MG/0.3ML solution auto-injector injection, Daily., Disp: , Rfl:   •  fentaNYL (DURAGESIC) 50 MCG/HR patch, Place 1 patch on the skin as directed by provider Every 72 (Seventy-Two) Hours., Disp: , Rfl:   •  ferrous sulfate 325 (65 FE) MG tablet, Take 325 mg by mouth Daily With Breakfast., Disp: , Rfl:   •  fluticasone (FLONASE) 50 MCG/ACT nasal spray, INHALE TWO SPRAYS EACH NOSTRIL EVERY DAY, Disp: , Rfl: 1  •  furosemide (LASIX) 20 MG tablet, Take 20 mg by mouth., Disp: , Rfl: 3  •  hydrALAZINE (APRESOLINE) 25 MG tablet, Take 25 mg by mouth 3 (Three) Times a Day., Disp: , Rfl:   •  levothyroxine (SYNTHROID, LEVOTHROID) 50 MCG tablet, Take 50 mcg by mouth Daily., Disp: , Rfl:   •   lidocaine (XYLOCAINE,URO-JET) 2 % gel, apply TO mucosal membrane THREE TIMES DAILY AS NEEDED AS directed, Disp: , Rfl: 0  •  LORazepam (ATIVAN) 0.5 MG tablet, Take 0.5 mg by mouth 2 (Two) Times a Day., Disp: , Rfl: 1  •  lubiprostone (AMITIZA) 24 MCG capsule, Take 24 mcg by mouth 2 (Two) Times a Day With Meals., Disp: , Rfl:   •  meclizine 25 MG chewable tablet chewable tablet, Chew 25 mg 2 (Two) Times a Day As Needed., Disp: , Rfl:   •  melatonin 5 MG tablet tablet, Take 5 mg by mouth At Night As Needed., Disp: , Rfl:   •  memantine (NAMENDA XR) 7 MG capsule sustained-release 24 hr extended release capsule, Take  by mouth Daily., Disp: , Rfl:   •  Naloxegol Oxalate (MOVANTIK) 25 MG tablet, Take 25 mg by mouth Every Morning., Disp: , Rfl:   •  NAMZARIC 28-10 MG capsule sustained-release 24 hr, Take 1 capsule by mouth Daily., Disp: , Rfl: 3  •  nitrofurantoin, macrocrystal-monohydrate, (MACROBID) 100 MG capsule, Take 100 mg by mouth 4 (Four) Times a Day., Disp: , Rfl: 5  •  omeprazole (priLOSEC) 40 MG capsule, Take 40 mg by mouth 2 (Two) Times a Day., Disp: , Rfl:   •  oxybutynin XL (DITROPAN-XL) 10 MG 24 hr tablet, Take 10 mg by mouth Daily., Disp: , Rfl:   •  polyethylene glycol (MIRALAX) packet, Take 17 g by mouth Daily., Disp: , Rfl:   •  potassium chloride (K-DUR) 10 MEQ CR tablet, Take 10 mEq by mouth 2 (Two) Times a Day., Disp: , Rfl: 2  •  predniSONE (DELTASONE) 5 MG tablet, Take 5 mg by mouth Daily., Disp: , Rfl:   •  RESTASIS 0.05 % ophthalmic emulsion, 2 (Two) Times a Day., Disp: , Rfl: 6  •  rOPINIRole (REQUIP) 1 MG tablet, Take 1 mg by mouth 3 (Three) Times a Day. Take 1 hour before bedtime., Disp: , Rfl:   •  sertraline (ZOLOFT) 100 MG tablet, Take 100 mg by mouth Daily., Disp: , Rfl:   •  SUMAtriptan (IMITREX) 25 MG tablet, As Needed., Disp: , Rfl: 0  •  vitamin B-12 (CYANOCOBALAMIN) 1000 MCG tablet, Take 1,000 mcg by mouth Daily., Disp: , Rfl:     Allergies   Allergen Reactions   • Inderal   [Propranolol] Unknown (See Comments)   • L-Methylfolate-Algae-B12-B6 Unknown (See Comments)   • Morphine Nausea And Vomiting   • Rofecoxib Other (See Comments)   • Topiramate Other (See Comments)       Family History   Problem Relation Age of Onset   • Breast cancer Maternal Aunt    • Leukemia Maternal Aunt        Cancer-related family history includes Breast cancer in her maternal aunt.    Social History     Tobacco Use   • Smoking status: Former Smoker     Packs/day: 1.50     Years: 30.00     Pack years: 45.00     Types: Cigarettes     Last attempt to quit: 2014     Years since quittin.2   • Smokeless tobacco: Never Used   Substance Use Topics   • Alcohol use: No     Frequency: Never   • Drug use: No         I have reviewed the history of present illness, past medical history, family history, social history, lab results, all notes and other records since the patient was last seen on  Visit date not found.    ROS:     Review of Systems    Objective:    There were no vitals filed for this visit.  ECOG  {[facility] ECOG Status:69554}    Physical Exam:   Physical Exam          Lab Results - Last 18 Months   Lab Units 19  0755 19  1150 19  0322   WBC 10*3/mm3 8.70 6.99 10.90*   HEMOGLOBIN g/dL 10.6* 9.8* 9.6*   HEMATOCRIT % 31.7* 30.2* 28.1*   PLATELETS 10*3/mm3 286 271 258   MCV fL 96.9 102.0* 96.5     Lab Results - Last 18 Months   Lab Units 19  0322 19  0044   SODIUM mmol/L 136 135*   POTASSIUM mmol/L 3.8 3.8   CHLORIDE mmol/L 101 97*   CO2 mmol/L 25.0 25.0   BUN mg/dL 25* 24*   CREATININE mg/dL 1.50* 1.50*   CALCIUM mg/dL 9.5 9.8   BILIRUBIN mg/dL  --  0.6   ALK PHOS U/L  --  63   ALT (SGPT) U/L  --  18   AST (SGOT) U/L  --  23   GLUCOSE mg/dL 117* 119*       Lab Results   Component Value Date    GLUCOSE 117 (H) 2019    BUN 25 (H) 2019    CREATININE 1.50 (H) 2019    EGFRIFNONA 33 (L) 2019    BCR 16.7 2019    K 3.8 2019    CO2 25.0  06/27/2019    CALCIUM 9.5 06/27/2019    ALBUMIN 4.20 06/27/2019    AST 23 06/27/2019    ALT 18 06/27/2019       Lab Results - Last 18 Months   Lab Units 09/12/19  0755   INR  0.96   APTT seconds 20.5*       Lab Results   Component Value Date    IRON 45 08/30/2019    TIBC 277 08/30/2019    FERRITIN 295.00 08/30/2019       Lab Results   Component Value Date    FOLATE 9.60 08/30/2019       No results found for: OCCULTBLD    No results found for: RETICCTPCT    Lab Results   Component Value Date    [id number] 889 08/30/2019     No results found for: SPEP, UPEP  No results found for: LDH, URICACID  No results found for: JOSAFAT, RF, SEDRATE  No results found for: FIBRINOGEN, HAPTOGLOBIN  Lab Results   Component Value Date    PTT 20.5 (L) 09/12/2019    INR 0.96 09/12/2019     No results found for:   No results found for: CEA  No components found for: CA-19-9  No results found for: PSA          Assessment/Plan     Assessment:  1. Chronic lymphocytic leukemia:  The patient does not have any B symptoms or lymph node swelling.  She is EMERSON Stage 0.  Her bone marrow biopsy previously showed nodular involvement by CLL with nodular deposits occupying 5-10% of marrow cellularity.  FISH panel was negative.  Cytogenetics showed normal karyotype.  Overall, patient did not have any poor prognostic features.  Recent CBC shows a stable white count.  Platelet count is also normal and physical exam does not reveal any lymphadenopathy and she does not have any B symptoms.    2. Anemia of chronic kidney disease:  Her hemoglobin remains above 10.  She also had iron deficiency and is on iron supplements.   Iron stores are adequate.  She has been taking two iron tablets daily.    3. Hypercalcemia:  PTH is in the high normal range.  Patient has CKD.  She is also taking vitamin D plus calcium supplements.  Vitamin D level is normal.  Her high calcium could be due to calcium supplements as well.   4. History of mild iron deficiency.  She is on  iron supplement.    5. Skin lesions:  She is seeing a dermatologist.  She had a skin biopsy.  She also reports having skin cancer excised on her right lower extremity with Dr. Buckley.    6. History of dizziness:  She had a thorough work up.  She saw cardiology.  She also saw an ophthalmologist.  She had a brain MRI which was negative.  7. History of headaches:  She has history of migraines.      PLAN:    1. Decrease iron supplement to twice a day.    2. Repeat iron levels in six months.   3. Regarding hypercalcemia, advised patient told hold off her vitamin D and calcium supplements.  She needs to see her nephrologist regarding this problem for further input.    4. Will follow patient back in six months with repeat CBC, CMP, iron levels.                    There are no diagnoses linked to this encounter.  No orders of the defined types were placed in this encounter.                    Thank you very much for providing the opportunity to participate in this patient’s care. Please do not hesitate to call if there are any other questions.    I have reviewed all relevant labs results,outside reports, imaging,,notes, vitals, medications and plan with the patient today.    Portions of the note have been Scribed by medical assistant/Nurse.  I have reviewed and made changes accordingly.

## 2019-09-24 ENCOUNTER — OFFICE VISIT (OUTPATIENT)
Dept: ONCOLOGY | Facility: CLINIC | Age: 79
End: 2019-09-24

## 2019-09-24 VITALS
SYSTOLIC BLOOD PRESSURE: 166 MMHG | RESPIRATION RATE: 18 BRPM | TEMPERATURE: 98.5 F | HEIGHT: 63 IN | BODY MASS INDEX: 17.89 KG/M2 | DIASTOLIC BLOOD PRESSURE: 50 MMHG | HEART RATE: 74 BPM | WEIGHT: 101 LBS

## 2019-09-24 DIAGNOSIS — R59.1 LYMPHADENOPATHY: ICD-10-CM

## 2019-09-24 DIAGNOSIS — R51.9 GENERALIZED HEADACHE: ICD-10-CM

## 2019-09-24 DIAGNOSIS — R91.8 MASS OF LOWER LOBE OF LEFT LUNG: ICD-10-CM

## 2019-09-24 DIAGNOSIS — C34.32 PRIMARY CANCER OF LEFT LOWER LOBE OF LUNG (HCC): Primary | ICD-10-CM

## 2019-09-24 DIAGNOSIS — C91.10 CHRONIC LYMPHOCYTIC LEUKEMIA (HCC): ICD-10-CM

## 2019-09-24 DIAGNOSIS — E83.52 HYPERCALCEMIA: ICD-10-CM

## 2019-09-24 DIAGNOSIS — C78.7 LIVER METASTASIS: Primary | ICD-10-CM

## 2019-09-24 PROBLEM — M85.80 SENILE OSTEOPENIA: Status: ACTIVE | Noted: 2019-09-24

## 2019-09-24 PROBLEM — B37.0 CANDIDIASIS OF MOUTH: Status: ACTIVE | Noted: 2019-09-24

## 2019-09-24 PROBLEM — N32.81 OVERACTIVE BLADDER: Status: ACTIVE | Noted: 2019-09-24

## 2019-09-24 PROBLEM — G47.00 INSOMNIA: Status: ACTIVE | Noted: 2019-09-24

## 2019-09-24 PROBLEM — M54.50 CHRONIC LOW BACK PAIN: Status: ACTIVE | Noted: 2019-09-24

## 2019-09-24 PROBLEM — C67.9 CARCINOMA OF BLADDER (HCC): Status: ACTIVE | Noted: 2019-09-24

## 2019-09-24 PROBLEM — IMO0002 URETHRAL STENOSIS: Status: ACTIVE | Noted: 2019-09-24

## 2019-09-24 PROBLEM — R11.10 VOMITING: Status: ACTIVE | Noted: 2019-09-24

## 2019-09-24 PROBLEM — H54.40 BLIND LEFT EYE: Status: ACTIVE | Noted: 2019-09-24

## 2019-09-24 PROBLEM — K21.00 GASTRO-ESOPHAGEAL REFLUX DISEASE WITH ESOPHAGITIS: Status: ACTIVE | Noted: 2019-09-24

## 2019-09-24 PROBLEM — G89.29 CHRONIC PAIN: Status: ACTIVE | Noted: 2019-09-24

## 2019-09-24 PROBLEM — Z91.030 ALLERGIC TO BEES: Status: ACTIVE | Noted: 2019-09-24

## 2019-09-24 PROBLEM — K21.9 GASTROESOPHAGEAL REFLUX DISEASE: Status: ACTIVE | Noted: 2019-09-24

## 2019-09-24 PROBLEM — R11.0 NAUSEA: Status: ACTIVE | Noted: 2019-09-24

## 2019-09-24 PROBLEM — I10 HYPERTENSIVE DISORDER: Status: ACTIVE | Noted: 2019-09-24

## 2019-09-24 PROBLEM — F03.90 DEMENTIA (HCC): Status: ACTIVE | Noted: 2019-09-24

## 2019-09-24 PROBLEM — C79.51 METASTASIS TO BONE (HCC): Status: ACTIVE | Noted: 2019-09-24

## 2019-09-24 PROBLEM — R92.8 ABNORMAL FINDING ON MAMMOGRAPHY: Status: ACTIVE | Noted: 2019-09-24

## 2019-09-24 PROBLEM — G62.9 NEUROPATHY: Status: ACTIVE | Noted: 2019-09-24

## 2019-09-24 PROBLEM — F32.A DEPRESSIVE DISORDER: Status: ACTIVE | Noted: 2019-09-24

## 2019-09-24 PROBLEM — G43.909 MIGRAINE: Status: ACTIVE | Noted: 2017-01-27

## 2019-09-24 PROBLEM — R10.9 ABDOMINAL PAIN: Status: ACTIVE | Noted: 2019-09-24

## 2019-09-24 PROBLEM — K59.09 CHRONIC CONSTIPATION: Status: ACTIVE | Noted: 2019-09-24

## 2019-09-24 PROBLEM — R53.83 FATIGUE: Status: ACTIVE | Noted: 2019-09-24

## 2019-09-24 PROBLEM — R13.10 DYSPHAGIA: Status: ACTIVE | Noted: 2019-09-24

## 2019-09-24 PROBLEM — M15.9 GENERALIZED OSTEOARTHRITIS: Status: ACTIVE | Noted: 2019-09-24

## 2019-09-24 PROBLEM — M51.36 DEGENERATION OF LUMBAR INTERVERTEBRAL DISC: Status: ACTIVE | Noted: 2019-09-24

## 2019-09-24 PROBLEM — E03.9 HYPOTHYROIDISM: Status: ACTIVE | Noted: 2019-09-24

## 2019-09-24 PROBLEM — C67.9 MALIGNANT TUMOR OF URINARY BLADDER (HCC): Status: ACTIVE | Noted: 2019-09-24

## 2019-09-24 PROBLEM — G89.29 CHRONIC LOW BACK PAIN: Status: ACTIVE | Noted: 2019-09-24

## 2019-09-24 PROBLEM — R32 URINARY INCONTINENCE: Status: ACTIVE | Noted: 2019-09-24

## 2019-09-24 PROBLEM — N39.3 STRESS INCONTINENCE OF URINE: Status: ACTIVE | Noted: 2019-09-24

## 2019-09-24 PROBLEM — D72.829 LEUKOCYTOSIS: Status: ACTIVE | Noted: 2019-09-24

## 2019-09-24 PROCEDURE — 99215 OFFICE O/P EST HI 40 MIN: CPT | Performed by: INTERNAL MEDICINE

## 2019-09-24 RX ORDER — CALCITRIOL 0.25 UG/1
0.25 CAPSULE, LIQUID FILLED ORAL DAILY
COMMUNITY
End: 2019-11-08 | Stop reason: SDUPTHER

## 2019-09-27 ENCOUNTER — TELEPHONE (OUTPATIENT)
Dept: ONCOLOGY | Facility: CLINIC | Age: 79
End: 2019-09-27

## 2019-09-27 ENCOUNTER — HOSPITAL ENCOUNTER (OUTPATIENT)
Dept: MRI IMAGING | Facility: HOSPITAL | Age: 79
Discharge: HOME | End: 2019-09-27
Admitting: INTERNAL MEDICINE

## 2019-09-27 DIAGNOSIS — R51.9 GENERALIZED HEADACHE: ICD-10-CM

## 2019-09-27 DIAGNOSIS — C34.32 PRIMARY CANCER OF LEFT LOWER LOBE OF LUNG (HCC): ICD-10-CM

## 2019-09-27 PROCEDURE — 70551 MRI BRAIN STEM W/O DYE: CPT

## 2019-09-27 NOTE — TELEPHONE ENCOUNTER
Call ms ray to give her appointment for xgeva stated that she did not know anything about getting the injection an juve has appointment to see dr molina on 10/10/19

## 2019-10-04 ENCOUNTER — OFFICE VISIT (OUTPATIENT)
Dept: FAMILY MEDICINE CLINIC | Facility: CLINIC | Age: 79
End: 2019-10-04

## 2019-10-04 VITALS
TEMPERATURE: 98.8 F | HEART RATE: 73 BPM | HEIGHT: 63 IN | OXYGEN SATURATION: 100 % | WEIGHT: 101 LBS | DIASTOLIC BLOOD PRESSURE: 67 MMHG | BODY MASS INDEX: 17.89 KG/M2 | SYSTOLIC BLOOD PRESSURE: 188 MMHG

## 2019-10-04 DIAGNOSIS — F03.91 DEMENTIA WITH BEHAVIORAL DISTURBANCE, UNSPECIFIED DEMENTIA TYPE: ICD-10-CM

## 2019-10-04 DIAGNOSIS — R91.8 MASS OF LOWER LOBE OF LEFT LUNG: Primary | ICD-10-CM

## 2019-10-04 DIAGNOSIS — M51.36 DEGENERATION OF LUMBAR INTERVERTEBRAL DISC: ICD-10-CM

## 2019-10-04 DIAGNOSIS — Z23 NEED FOR PROPHYLACTIC VACCINATION AND INOCULATION AGAINST INFLUENZA: ICD-10-CM

## 2019-10-04 DIAGNOSIS — C78.7 LIVER METASTASIS: ICD-10-CM

## 2019-10-04 DIAGNOSIS — C91.10 CLL (CHRONIC LYMPHOCYTIC LEUKEMIA) (HCC): ICD-10-CM

## 2019-10-04 DIAGNOSIS — C79.51 METASTASIS TO BONE (HCC): ICD-10-CM

## 2019-10-04 DIAGNOSIS — I10 ESSENTIAL HYPERTENSION: ICD-10-CM

## 2019-10-04 PROCEDURE — 90653 IIV ADJUVANT VACCINE IM: CPT | Performed by: FAMILY MEDICINE

## 2019-10-04 PROCEDURE — 99214 OFFICE O/P EST MOD 30 MIN: CPT | Performed by: FAMILY MEDICINE

## 2019-10-04 PROCEDURE — G0008 ADMIN INFLUENZA VIRUS VAC: HCPCS | Performed by: FAMILY MEDICINE

## 2019-10-04 RX ORDER — FERROUS SULFATE 325(65) MG
325 TABLET ORAL
Qty: 30 TABLET | Refills: 5 | Status: SHIPPED | OUTPATIENT
Start: 2019-10-04 | End: 2020-03-20 | Stop reason: SDUPTHER

## 2019-10-04 RX ORDER — FENTANYL 50 UG/H
1 PATCH TRANSDERMAL
Qty: 10 PATCH | Refills: 0 | Status: SHIPPED | OUTPATIENT
Start: 2019-10-04 | End: 2019-11-08 | Stop reason: SDUPTHER

## 2019-10-04 RX ORDER — OXYBUTYNIN CHLORIDE 10 MG/1
10 TABLET, EXTENDED RELEASE ORAL DAILY
Qty: 30 TABLET | Refills: 5 | Status: SHIPPED | OUTPATIENT
Start: 2019-10-04 | End: 2020-02-26

## 2019-10-04 RX ORDER — HYDRALAZINE HYDROCHLORIDE 25 MG/1
25 TABLET, FILM COATED ORAL 3 TIMES DAILY
Qty: 90 TABLET | Refills: 5 | Status: SHIPPED | OUTPATIENT
Start: 2019-10-04 | End: 2020-03-20 | Stop reason: SDUPTHER

## 2019-10-04 RX ORDER — ROPINIROLE 1 MG/1
1 TABLET, FILM COATED ORAL NIGHTLY
Qty: 30 TABLET | Refills: 5 | Status: SHIPPED | OUTPATIENT
Start: 2019-10-04 | End: 2020-02-26

## 2019-10-04 NOTE — PROGRESS NOTES
HEMATOLOGY ONCOLOGY FOLLOW UP        Patient name: Ann Andre  : 1940  MRN: [id number]  Primary Care Physician: Rochelle Fletcher MD  Referring Physician: Rochelle Fletcher, *  Reason For Consult:     Chief Complaint   Patient presents with   • Follow-up     metastatic lung cancer   Chronic lymphocytic leukemia  Anemia    History of Present Illness:  Ms. Andre is a pleasant 78-year-old female who underwent lab work at her Primary Care Physician’s office on 10/27/14 that showed leukocytosis with a white count of 12.1 and normal hemoglobin and platelet count.  Her absolute lymphocyte count is 8,000 (elevated).  ESR was normal at 6.  The patient was therefore referred for hematology for further evaluation of the lymphocytosis.  She also has chronic kidney disease and her creatinine was 1.8 on 10/29/14.      14 - On initial evaluation in our office, WBC 12.2, hemoglobin 10.8, platelet count 311,000, lymphocyte count is almost 6,000.  The patient denied any fever, chills, night sweats, weight loss or lymph node swelling.  She does report having chronic anemia and also had received B12 injections in the past.  She reports history of mild chronic kidney disease.    • 14 - Creatinine 2.3, BUN 27, iron saturation 22%, TIBC 286, serum iron 64, ferritin 232, B12 566, reticulocyte count 1.3%.   • 14 - Flow cytometry:  Immunophenotyping identifies CD5 positive, CD23 positive, Monoclonal copper B cell population representing 24% of the total events.  CD 38 was negative.  This represents CLL.   • 1/22/15 - Bone marrow biopsy:  Variable cellular bone marrow with involvement by SLL/CLL in a nodular pattern.  Flow cytometry involvement 24% by CLL/SLL.  Nodular pattern of spread is considered a good prognostic finding.   Examination with PAS stain revealed several interstitial nodules which occupy 5% to 10% of the total marrow cellularity.  Cytogenetics showed normal karyotype.   CLL FISH panel:  Negative.  Specific probes for TP53 on chromosome 17, NABIL on chromosome 11, chromosome 12 and chromosome 13 were negative.  This indicates good prognosis.    • 2/12/15 - WBC 11.1, hemoglobin 10.9, platelet count 360,000, MCV 98.6.   • 7/14/15 - WBC 9.2, hemoglobin 10.1, platelet count 306,000, MCV 94.1.  • 4/21/15 - Iron saturation 21%, TIBC 264,000, serum iron 56, ferritin 199, EPO level 6, reticulocyte count 1.6%, haptoglobin 124.    • 10/20/15 - WBC 10.7, hemoglobin 11.2, platelet count 348,000.  • 2/16/16 - Iron saturation 25%, TIBC 250, serum iron 63.  WBC 7.7, hemoglobin 10.9, platelet count 293,000, MCV 91.3, lymphocytes 3,900.  • 3/14/16 - Creatinine 1.48.  • 8/30/16 - Lymphocyte count 4,370.  Iron saturation 26%, TIBC 262, serum iron 67.    • 9/6/16 - WBC 11.5, hemoglobin 11.6, platelet count 334,000, MCV 93.6.  • 1/2/17 - WBC 7.2, hemoglobin 10.6, platelet count 310,000, MCV 97.2.    • 5/22/17 - WBC 8.9, hemoglobin 10.7, platelet count 294,000, MCV 99.7.  Absolute lymphocyte count 3.66 (H).  Iron 50, TIBC 205, iron saturation 20%.    • 3/27/18 - WBC 10.1, hemoglobin 11, MCV 96.7, platelet count 301,000.  Serum iron 80, iron saturation 31%, TIBC 256.  Creatinine 1.5, BUN 27.    • 9/20/18 - WBC 9, hemoglobin 10, platelet count 284,000, .5.  Absolute lymphocyte count 4250 (H).  Ferritin 351.  Iron 51, TIBC 197.  • 12/28/18 - Creatinine 1.7, BUN 31.  Calcium 10.7 (H), albumin 4.1.  WBC 8.7, hemoglobin 10.3, platelet count 375,000, .6.    • 1/8/19 - TSH 1.74.  Ferritin 379.  Iron saturation 14%, TIBC 261, serum iron 37 (L).  SPEP appears normal.  Serum immunofixation:  No monoclonal band.  PTH intact 63 (normal range 14-64).  Serum calcium 10.3 (N).    • 3/12/19 - WBC 7.6, hemoglobin 11.5, platelet count 312,000, .9.  Creatinine 1.38, BUN 22, calcium 10.9 (H).  Vitamin D level 49 (N).  • 7/22/2019 CT scan chest without contrast: There is a new mass, multilobulated within  the left lower lobe, measures 3.1 x 2.3 cm.  Adjacent scarring is present as well.  Evidence of prior granulomatous disease, no obvious mediastinal or hilar lymphadenopathy..  Aneurysm of the ascending thoracic aorta.  CT scan was done in the context of left rib cage pain after having a fall.  • 7/18/2019 MRI brain: Possible enhancing lesion seen only on coronal image 8 measures 4.4 mm within the right precentral gyrus.  Per the radiologist, this could be an old infarct or metastases or pulsation artifact.  No acute infarct.  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  FDG avid left hilar lymphadenopathy with SUV of 8.2..  Multiple FDG avid hepatic and osseous lesions compatible with the metastasis.  There are approximately 15-20 FDG avid osseous lesions compatible with mets.  Index lesion involving the left posterior eighth rib demonstrates lytic changes with SUV of 12.9.  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 The biopsy is stained via immunohistochemical technique utilizing appropriate controls for the presence of CK7, CK5/6, p63, TTF-1, and napsin A. The tumor cells are strongly positive for all markers tested and indicate both a squamous and an adenocarcinoma component.  • 9/23/2019 WBC 9.3, hemoglobin 10, platelets 306, .3  • 9/24/2019 brain MRI with and without contrast: No evidence of any brain metastasis.  • October 7, 2019 Next generation sequencing testing by [facility]:= PDL 1 by IHC 60%.  Testing is negative for Sylvester BRAF, ROS 1, MET, and NTRK, K-walt, ERBB 2, RET,  STK11,TP53.. 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        Subjective:10/08/19  Patient is here for a follow up appointment. She had her MRI of the brain on 9/27/19. Her Calcium with Vitamin D is on hold due to hypercalcemia. She has not started the Xgeva yet. She states she has a headache today.  She reports headaches.  But denies any weight loss.  Does not report any new symptoms.  Has not received a Xgeva yet.      The following portions of the patient's history were reviewed and updated as appropriate: allergies, current medications, past family history, past medical history, past social history, past surgical history and problem list.         Past Medical History:   Diagnosis Date   • Anemia of renal disease 6/27/2019   • Arthritis    • Cancer (CMS/HCC)    • Chronic back pain    • Chronic kidney disease (CKD)    • Depression    • Elevated cholesterol    • Gastroesophageal reflux disease 9/24/2019   • Hypertension    • Migraine    • Polypharmacy 6/27/2019   • Protein-calorie malnutrition, moderate (CMS/HCC) 6/27/2019   • Restless leg syndrome    • Stroke (CMS/HCC)        Past Surgical History:   Procedure Laterality Date   • APPENDECTOMY     • CHOLECYSTECTOMY     • HYSTERECTOMY     • JOINT REPLACEMENT     • OOPHORECTOMY     • SHOULDER SURGERY Left          Current Outpatient Medications:   •  amLODIPine (NORVASC) 5 MG tablet, Take 5 mg by mouth Daily., Disp: , Rfl:   •  Biotin 5000 MCG capsule, Take  by mouth Daily., Disp: , Rfl:   •  calcitriol (ROCALTROL) 0.25 MCG capsule, Take 0.25 mcg by mouth Daily., Disp: , Rfl:   •  Cranberry-Cholecalciferol 4200-500 MG-UNIT capsule, Take  by mouth Daily., Disp: , Rfl:   •  cyclobenzaprine (FLEXERIL) 10 MG tablet, Take 10 mg by mouth 3 (Three) Times a Day., Disp: , Rfl: 2  •  docusate sodium (COLACE) 100 MG capsule, Take 100 mg by mouth Daily., Disp:  , Rfl:   •  DULoxetine (CYMBALTA) 60 MG capsule, Take 60 mg by mouth Daily., Disp: , Rfl:   •  EPINEPHrine (EPIPEN) 0.3 MG/0.3ML solution auto-injector injection, Daily., Disp: , Rfl:   •  fentaNYL (DURAGESIC) 50 MCG/HR patch, Place 1 patch on the skin as directed by provider Every 72 (Seventy-Two) Hours., Disp: 10 patch, Rfl: 0  •  ferrous sulfate 325 (65 FE) MG tablet, Take 1 tablet by mouth Daily With Breakfast., Disp: 30 tablet, Rfl: 5  •  fluticasone (FLONASE) 50 MCG/ACT nasal spray, INHALE TWO SPRAYS EACH NOSTRIL EVERY DAY, Disp: , Rfl: 1  •  furosemide (LASIX) 20 MG tablet, Take 20 mg by mouth., Disp: , Rfl: 3  •  hydrALAZINE (APRESOLINE) 25 MG tablet, Take 1 tablet by mouth 3 (Three) Times a Day., Disp: 90 tablet, Rfl: 5  •  levothyroxine (SYNTHROID, LEVOTHROID) 50 MCG tablet, Take 50 mcg by mouth Daily., Disp: , Rfl:   •  lidocaine (XYLOCAINE,URO-JET) 2 % gel, apply TO mucosal membrane THREE TIMES DAILY AS NEEDED AS directed, Disp: , Rfl: 0  •  LORazepam (ATIVAN) 0.5 MG tablet, Take 0.5 mg by mouth As Needed., Disp: , Rfl: 1  •  meclizine 25 MG chewable tablet chewable tablet, Chew 25 mg 2 (Two) Times a Day As Needed., Disp: , Rfl:   •  melatonin 5 MG tablet tablet, Take 5 mg by mouth At Night As Needed., Disp: , Rfl:   •  Naloxegol Oxalate (MOVANTIK) 25 MG tablet, Take 25 mg by mouth Every Morning., Disp: , Rfl:   •  NAMZARIC 28-10 MG capsule sustained-release 24 hr, Take 1 capsule by mouth Daily., Disp: , Rfl: 3  •  nitrofurantoin, macrocrystal-monohydrate, (MACROBID) 100 MG capsule, Take 100 mg by mouth 4 (Four) Times a Day., Disp: , Rfl: 5  •  omeprazole (priLOSEC) 40 MG capsule, Take 40 mg by mouth 2 (Two) Times a Day., Disp: , Rfl:   •  oxybutynin XL (DITROPAN-XL) 10 MG 24 hr tablet, Take 1 tablet by mouth Daily., Disp: 30 tablet, Rfl: 5  •  polyethylene glycol (MIRALAX) packet, Take 17 g by mouth As Needed., Disp: , Rfl:   •  potassium chloride (K-DUR) 10 MEQ CR tablet, Take 10 mEq by mouth 2 (Two)  Times a Day., Disp: , Rfl: 2  •  predniSONE (DELTASONE) 5 MG tablet, Take 5 mg by mouth Daily., Disp: , Rfl:   •  RESTASIS 0.05 % ophthalmic emulsion, 2 (Two) Times a Day., Disp: , Rfl: 6  •  rOPINIRole (REQUIP) 1 MG tablet, Take 1 tablet by mouth Every Night. Take 1 hour before bedtime., Disp: 30 tablet, Rfl: 5  •  sertraline (ZOLOFT) 100 MG tablet, Take 100 mg by mouth Daily., Disp: , Rfl:   •  SUMAtriptan (IMITREX) 25 MG tablet, As Needed., Disp: , Rfl: 0  •  butalbital-acetaminophen-caffeine (ESGIC) -40 MG per tablet, Take 1 tablet by mouth 2 (Two) Times a Day As Needed for Headache., Disp: 60 tablet, Rfl: 0    Allergies   Allergen Reactions   • Inderal  [Propranolol] Unknown (See Comments)   • L-Methylfolate-Algae-B12-B6 Unknown (See Comments)   • Morphine Nausea And Vomiting   • Rofecoxib Other (See Comments)   • Topiramate Other (See Comments)   • Other Other (See Comments)       Family History   Problem Relation Age of Onset   • Breast cancer Maternal Aunt    • Leukemia Maternal Aunt        Cancer-related family history includes Breast cancer in her maternal aunt.    Social History     Tobacco Use   • Smoking status: Former Smoker     Packs/day: 1.50     Years: 30.00     Pack years: 45.00     Types: Cigarettes     Last attempt to quit: 2014     Years since quittin.2   • Smokeless tobacco: Never Used   Substance Use Topics   • Alcohol use: No     Frequency: Never   • Drug use: No         I have reviewed the history of present illness, past medical history, family history, social history, lab results, all notes and other records since the patient was last seen on  Visit date not found.    ROS:     Review of Systems   Constitutional: Positive for fatigue and unexpected weight change (have lost #20 pounds in the past year). Negative for appetite change, chills and fever.   HENT: Negative for ear pain, mouth sores, nosebleeds and sore throat.    Eyes: Negative for photophobia and visual  "disturbance.   Respiratory: Negative for shortness of breath, wheezing and stridor.    Cardiovascular: Negative for chest pain and palpitations.   Gastrointestinal: Negative for abdominal pain, diarrhea, nausea and vomiting.   Endocrine: Negative for cold intolerance and heat intolerance.   Genitourinary: Negative for dysuria and hematuria.   Musculoskeletal: Positive for myalgias. Negative for joint swelling and neck stiffness.        Bilateral rib pain   Skin: Negative for color change and rash.   Neurological: Positive for headaches. Negative for seizures and syncope.   Hematological: Negative for adenopathy.        No obvious bleeding   Psychiatric/Behavioral: Negative for agitation, confusion, hallucinations and sleep disturbance.       Objective:    Vitals:    10/08/19 1359   BP: 157/64   Pulse: 88   Resp: 18   Temp: 98.5 °F (36.9 °C)   TempSrc: Oral   Weight: 47.2 kg (104 lb)   Height: 161.3 cm (63.5\")   PainSc: 0-No pain     ECOG  (1) Restricted in physically strenuous activity, ambulatory and able to do work of light nature    Physical Exam:   Physical Exam   Constitutional: She is oriented to person, place, and time. She appears well-developed and well-nourished. No distress.   Somewhat thin appearing elderly female   HENT:   Head: Normocephalic and atraumatic.   Eyes: Conjunctivae and EOM are normal. Right eye exhibits no discharge. Left eye exhibits no discharge. No scleral icterus.   Neck: Normal range of motion. Neck supple. No thyromegaly present.   Cardiovascular: Normal rate, regular rhythm and normal heart sounds. Exam reveals no gallop and no friction rub.   Pulmonary/Chest: Effort normal. No respiratory distress. She has no wheezes.   Decreased air entry bilaterally in the lungs.   Abdominal: Soft. Bowel sounds are normal. She exhibits no mass. There is no tenderness. There is no rebound and no guarding.   Musculoskeletal: Normal range of motion. She exhibits no tenderness.   Lymphadenopathy:     " She has no cervical adenopathy.   Neurological: She is alert and oriented to person, place, and time. She exhibits normal muscle tone.   Skin: Skin is warm. No rash noted. She is not diaphoretic. No erythema.   Psychiatric: She has a normal mood and affect. Her behavior is normal.   Nursing note and vitals reviewed.            Lab Results - Last 18 Months   Lab Units 09/12/19  0755 08/30/19  1150 06/27/19  0322   WBC 10*3/mm3 8.70 6.99 10.90*   HEMOGLOBIN g/dL 10.6* 9.8* 9.6*   HEMATOCRIT % 31.7* 30.2* 28.1*   PLATELETS 10*3/mm3 286 271 258   MCV fL 96.9 102.0* 96.5     Lab Results - Last 18 Months   Lab Units 06/27/19  0322 06/27/19  0044   SODIUM mmol/L 136 135*   POTASSIUM mmol/L 3.8 3.8   CHLORIDE mmol/L 101 97*   CO2 mmol/L 25.0 25.0   BUN mg/dL 25* 24*   CREATININE mg/dL 1.50* 1.50*   CALCIUM mg/dL 9.5 9.8   BILIRUBIN mg/dL  --  0.6   ALK PHOS U/L  --  63   ALT (SGPT) U/L  --  18   AST (SGOT) U/L  --  23   GLUCOSE mg/dL 117* 119*       Lab Results   Component Value Date    GLUCOSE 117 (H) 06/27/2019    BUN 25 (H) 06/27/2019    CREATININE 1.50 (H) 06/27/2019    EGFRIFNONA 33 (L) 06/27/2019    BCR 16.7 06/27/2019    K 3.8 06/27/2019    CO2 25.0 06/27/2019    CALCIUM 9.5 06/27/2019    ALBUMIN 4.20 06/27/2019    AST 23 06/27/2019    ALT 18 06/27/2019       Lab Results - Last 18 Months   Lab Units 09/12/19  0755   INR  0.96   APTT seconds 20.5*       Lab Results   Component Value Date    IRON 45 08/30/2019    TIBC 277 08/30/2019    FERRITIN 295.00 08/30/2019       Lab Results   Component Value Date    FOLATE 9.60 08/30/2019       No results found for: OCCULTBLD    No results found for: RETICCTPCT    Lab Results   Component Value Date    [id number] 889 08/30/2019     No results found for: SPEP, UPEP  No results found for: LDH, URICACID  No results found for: JOSAFAT, RF, SEDRATE  No results found for: FIBRINOGEN, HAPTOGLOBIN  Lab Results   Component Value Date    PTT 20.5 (L) 09/12/2019    INR 0.96 09/12/2019     No  results found for:   No results found for: CEA  No components found for: CA-19-9  No results found for: PSA          Assessment/Plan     Assessment;  1. Metastatic lung cancer: Stage IV .she presented with a left lower lobe lung mass.  PET scan shows liver metastases and bone metastases.  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  Adenosquamous carcinoma tend to be more aggressive than AdenoCa or squamous carcinomas.  Next generation sequencing testing was performed and it failed to show any targetable mutations.  IHC shows PDL 1 score of 60%.  2. Lung cancer PDL 1 score 60%  3. Chronic lymphocytic leukemia:  The patient does not have any B symptoms or lymph node swelling.  She is EMERSON Stage 0.  Her bone marrow biopsy previously showed nodular involvement by CLL with nodular deposits occupying 5-10% of marrow cellularity.  FISH panel was negative.  Cytogenetics showed normal karyotype.  Overall, patient did not have any poor prognostic features.  Recent CBC shows a stable white count.  Platelet count is also normal and physical exam does not reveal any lymphadenopathy and she does not have any B symptoms.    4. Enhancing lesion MRI brain: Only 4.4 mm.  It does not appear suspicious for metastasis.  5. Anemia of chronic kidney disease:  Her hemoglobin remains above 10.  She also had iron deficiency and is on iron supplements.   Iron stores are adequate.  She has been taking two iron tablets daily.    6. History of hypercalcemia: PET scan now shows bone metastasis.  PTH is in the high normal range.  Patient has CKD.  .  Vitamin D level is normal.  Will start patient on Xgeva.    7. History of mild iron deficiency.  She is on iron supplement.    8. Hx of Skin lesions:  She is seeing a dermatologist.  She had a skin biopsy.  She also reports having skin cancer excised on her right lower extremity with Dr. Buckley.    9. History of dizziness:  She had a thorough work up.  She saw cardiology.  She also saw  an ophthalmologist.  She had a brain MRI which was negative.  10. History of headaches:  She has history of migraines.      PLAN:    1. Discussed the results of next generation sequencing and immunohistochemistry.  At this point will recommend starting on the immunotherapy as first-line as PDL 1 score is over 60%.  Discussed the risks and benefits of immunotherapy..  2. We will start patient on Xgeva for bone metastases.  3. Provided prescription of Fioricet for headaches.  4. Regarding hypercalcemia, advised patient told hold off her vitamin D and calcium supplements. \.                Follow-up in 3 weeks      Primary cancer of left lower lobe of lung (CMS/HCC)    Liver metastasis (CMS/HCC)    Metastasis to bone (CMS/HCC)    CLL (chronic lymphocytic leukemia) (CMS/HCC)    Hypercalcemia    No orders of the defined types were placed in this encounter.                    Thank you very much for providing the opportunity to participate in this patient’s care. Please do not hesitate to call if there are any other questions.    I have reviewed all relevant labs results, imaging,Outside reports,notes, vitals, medications and plan with the patient today.    Portions of the note have been Scribed by medical assistant/Nurse.  I have reviewed and made changes accordingly.

## 2019-10-08 ENCOUNTER — OFFICE VISIT (OUTPATIENT)
Dept: ONCOLOGY | Facility: CLINIC | Age: 79
End: 2019-10-08

## 2019-10-08 VITALS
DIASTOLIC BLOOD PRESSURE: 64 MMHG | SYSTOLIC BLOOD PRESSURE: 157 MMHG | BODY MASS INDEX: 17.75 KG/M2 | TEMPERATURE: 98.5 F | HEIGHT: 64 IN | RESPIRATION RATE: 18 BRPM | HEART RATE: 88 BPM | WEIGHT: 104 LBS

## 2019-10-08 DIAGNOSIS — C78.7 LIVER METASTASIS: ICD-10-CM

## 2019-10-08 DIAGNOSIS — E83.52 HYPERCALCEMIA: ICD-10-CM

## 2019-10-08 DIAGNOSIS — C79.51 METASTASIS TO BONE (HCC): ICD-10-CM

## 2019-10-08 DIAGNOSIS — C34.32 PRIMARY CANCER OF LEFT LOWER LOBE OF LUNG (HCC): Primary | ICD-10-CM

## 2019-10-08 DIAGNOSIS — C91.10 CLL (CHRONIC LYMPHOCYTIC LEUKEMIA) (HCC): ICD-10-CM

## 2019-10-08 PROCEDURE — 99215 OFFICE O/P EST HI 40 MIN: CPT | Performed by: INTERNAL MEDICINE

## 2019-10-08 RX ORDER — BUTALBITAL, ACETAMINOPHEN AND CAFFEINE 50; 325; 40 MG/1; MG/1; MG/1
1 TABLET ORAL 2 TIMES DAILY PRN
Qty: 60 TABLET | Refills: 0 | Status: SHIPPED | OUTPATIENT
Start: 2019-10-08 | End: 2019-11-22 | Stop reason: SDUPTHER

## 2019-10-15 ENCOUNTER — TELEPHONE (OUTPATIENT)
Dept: ONCOLOGY | Facility: CLINIC | Age: 79
End: 2019-10-15

## 2019-10-15 ENCOUNTER — TELEPHONE (OUTPATIENT)
Dept: FAMILY MEDICINE CLINIC | Facility: CLINIC | Age: 79
End: 2019-10-15

## 2019-10-15 NOTE — TELEPHONE ENCOUNTER
Patient called into office and stated that she left her Fentanyl patches at home and she is currently in [place] with her son. She is unaware if she is able to have them Rx'd to [place] or if you would give [facility] to mail them via certified mail.

## 2019-10-15 NOTE — TELEPHONE ENCOUNTER
If there is someone here at home she trusts who has access to her patches, then they could mail them to her.  I do not have to be involved in that.  I would recommend instead that she go to a local urgent care center and explained to the doctor there that she left her patches at home and ask for short supply to get her by until she gets home.  FYI - I cannot send a prescription for this to [place].  Thanks

## 2019-10-16 ENCOUNTER — TELEPHONE (OUTPATIENT)
Dept: ONCOLOGY | Facility: CLINIC | Age: 79
End: 2019-10-16

## 2019-10-16 NOTE — TELEPHONE ENCOUNTER
Talk with ms ray daughter stated that she in out of town until the middle of next they will call me when she get back to make appointment gave her my number to call

## 2019-10-16 NOTE — TELEPHONE ENCOUNTER
S/w patient's dtr Ena who states that she will have someone trustworthy certify mail them to the patient with the actual rx container too to prove that it is her rx incase of any questions.

## 2019-10-17 DIAGNOSIS — C34.32 PRIMARY CANCER OF LEFT LOWER LOBE OF LUNG (HCC): ICD-10-CM

## 2019-10-17 RX ORDER — SODIUM CHLORIDE 9 MG/ML
250 INJECTION, SOLUTION INTRAVENOUS ONCE
Status: CANCELLED | OUTPATIENT
Start: 2019-10-30

## 2019-10-24 LAB
LAB AP CARIS, ADDENDUM: NORMAL
LAB AP CASE REPORT: NORMAL
LAB AP DIAGNOSIS COMMENT: NORMAL
Lab: NORMAL
PATH REPORT.FINAL DX SPEC: NORMAL
PATH REPORT.GROSS SPEC: NORMAL

## 2019-10-29 ENCOUNTER — APPOINTMENT (OUTPATIENT)
Dept: ONCOLOGY | Facility: CLINIC | Age: 79
End: 2019-10-29

## 2019-10-30 ENCOUNTER — HOSPITAL ENCOUNTER (OUTPATIENT)
Dept: ONCOLOGY | Facility: HOSPITAL | Age: 79
Setting detail: INFUSION SERIES
Discharge: HOME | End: 2019-10-30

## 2019-10-30 ENCOUNTER — DOCUMENTATION (OUTPATIENT)
Dept: ONCOLOGY | Facility: HOSPITAL | Age: 79
End: 2019-10-30

## 2019-10-30 VITALS
TEMPERATURE: 98.4 F | RESPIRATION RATE: 18 BRPM | HEIGHT: 63 IN | BODY MASS INDEX: 18.48 KG/M2 | HEART RATE: 73 BPM | DIASTOLIC BLOOD PRESSURE: 72 MMHG | SYSTOLIC BLOOD PRESSURE: 141 MMHG | WEIGHT: 104.3 LBS

## 2019-10-30 DIAGNOSIS — C79.51 METASTASIS TO BONE (HCC): Primary | ICD-10-CM

## 2019-10-30 DIAGNOSIS — C34.32 PRIMARY CANCER OF LEFT LOWER LOBE OF LUNG (HCC): ICD-10-CM

## 2019-10-30 LAB
ALBUMIN SERPL-MCNC: 4.2 G/DL (ref 3.5–5.2)
ALBUMIN/GLOB SERPL: 1.3 G/DL
ALP SERPL-CCNC: 109 U/L (ref 39–117)
ALT SERPL W P-5'-P-CCNC: 11 U/L (ref 1–33)
ANION GAP SERPL CALCULATED.3IONS-SCNC: 13 MMOL/L (ref 5–15)
AST SERPL-CCNC: 24 U/L (ref 1–32)
BASOPHILS # BLD AUTO: 0.09 10*3/MM3 (ref 0–0.2)
BASOPHILS NFR BLD AUTO: 1.3 % (ref 0–1.5)
BILIRUB SERPL-MCNC: 0.2 MG/DL (ref 0.2–1.2)
BUN BLD-MCNC: 26 MG/DL (ref 8–23)
BUN/CREAT SERPL: 19.5 (ref 7–25)
CALCIUM SPEC-SCNC: 10 MG/DL (ref 8.6–10.5)
CHLORIDE SERPL-SCNC: 101 MMOL/L (ref 98–107)
CO2 SERPL-SCNC: 23 MMOL/L (ref 22–29)
CREAT BLD-MCNC: 1.33 MG/DL (ref 0.57–1)
DEPRECATED RDW RBC AUTO: 44 FL (ref 37–54)
EOSINOPHIL # BLD AUTO: 0.22 10*3/MM3 (ref 0–0.4)
EOSINOPHIL NFR BLD AUTO: 3.2 % (ref 0.3–6.2)
ERYTHROCYTE [DISTWIDTH] IN BLOOD BY AUTOMATED COUNT: 12.5 % (ref 12.3–15.4)
GFR SERPL CREATININE-BSD FRML MDRD: 38 ML/MIN/1.73
GLOBULIN UR ELPH-MCNC: 3.3 GM/DL
GLUCOSE BLD-MCNC: 97 MG/DL (ref 65–99)
HCT VFR BLD AUTO: 31.5 % (ref 34–46.6)
HGB BLD-MCNC: 10 G/DL (ref 12–15.9)
LYMPHOCYTES # BLD AUTO: 1.69 10*3/MM3 (ref 0.7–3.1)
LYMPHOCYTES NFR BLD AUTO: 25 % (ref 19.6–45.3)
MCH RBC QN AUTO: 31.6 PG (ref 26.6–33)
MCHC RBC AUTO-ENTMCNC: 31.7 G/DL (ref 31.5–35.7)
MCV RBC AUTO: 99.7 FL (ref 79–97)
MONOCYTES # BLD AUTO: 0.42 10*3/MM3 (ref 0.1–0.9)
MONOCYTES NFR BLD AUTO: 6.2 % (ref 5–12)
NEUTROPHILS # BLD AUTO: 4.35 10*3/MM3 (ref 1.7–7)
NEUTROPHILS NFR BLD AUTO: 64.3 % (ref 42.7–76)
PLATELET # BLD AUTO: 360 10*3/MM3 (ref 140–450)
PMV BLD AUTO: 8.4 FL (ref 6–12)
POTASSIUM BLD-SCNC: 5 MMOL/L (ref 3.5–5.2)
PROT SERPL-MCNC: 7.5 G/DL (ref 6–8.5)
RBC # BLD AUTO: 3.16 10*6/MM3 (ref 3.77–5.28)
SODIUM BLD-SCNC: 137 MMOL/L (ref 136–145)
T4 FREE SERPL-MCNC: 1.05 NG/DL (ref 0.93–1.7)
TSH SERPL DL<=0.05 MIU/L-ACNC: 4.44 UIU/ML (ref 0.27–4.2)
WBC NRBC COR # BLD: 6.77 10*3/MM3 (ref 3.4–10.8)

## 2019-10-30 PROCEDURE — 96372 THER/PROPH/DIAG INJ SC/IM: CPT | Performed by: INTERNAL MEDICINE

## 2019-10-30 PROCEDURE — 84439 ASSAY OF FREE THYROXINE: CPT | Performed by: INTERNAL MEDICINE

## 2019-10-30 PROCEDURE — 25010000002 PEMBROLIZUMAB 100 MG/4ML SOLUTION 4 ML VIAL: Performed by: INTERNAL MEDICINE

## 2019-10-30 PROCEDURE — 85025 COMPLETE CBC W/AUTO DIFF WBC: CPT | Performed by: INTERNAL MEDICINE

## 2019-10-30 PROCEDURE — 84443 ASSAY THYROID STIM HORMONE: CPT | Performed by: INTERNAL MEDICINE

## 2019-10-30 PROCEDURE — 80053 COMPREHEN METABOLIC PANEL: CPT | Performed by: INTERNAL MEDICINE

## 2019-10-30 PROCEDURE — 36415 COLL VENOUS BLD VENIPUNCTURE: CPT | Performed by: INTERNAL MEDICINE

## 2019-10-30 PROCEDURE — 25010000002 DENOSUMAB 120 MG/1.7ML SOLUTION: Performed by: INTERNAL MEDICINE

## 2019-10-30 PROCEDURE — 96413 CHEMO IV INFUSION 1 HR: CPT | Performed by: INTERNAL MEDICINE

## 2019-10-30 RX ADMIN — DENOSUMAB 120 MG: 120 INJECTION SUBCUTANEOUS at 13:31

## 2019-10-30 RX ADMIN — SODIUM CHLORIDE 200 MG: 900 INJECTION, SOLUTION INTRAVENOUS at 12:40

## 2019-10-30 NOTE — PROGRESS NOTES
"                 Nutrition Assessment  Ann Andre    Anthropometrics  Height: 5'3\"  Weight: 104.2#  BMI: 18.5  Weight Hx:   (6/26/19) 106#  (8/5/19) 102.9#  (9/12/19) 102.5#  (10/4/19) 100.9#  IBW: 115# (90.6%)  UBW: 125# (83.3%)    Nutrition Questionnaire    Food Allergies: Pt denied allergies at this time    Chewing/Swallowing Problems: Pt denied chewing and swallowing problems at this time. Pt w/o bottom teeth or dentures, she wears top dentures. Pt said she needs to have her esophagus stretched every now and then due to a narrow esophagus.    Who does the cooking & shopping: Pt does.    Nausea/Vomiting/Diarrhea: Pt denied n/v, she has diarrhea and manages with medication.    Appetite: Good sometimes, she feels hungry now    24-Hour Diet Recall:   Breakfast - cheerios with a banana and whole milk, coffee with cream and sweet n' low  Lunch - banana peanut butter sandwich, pepsi  Dinner - [employer] chicken noodle soup, turkey and cheese sandwich, pepsi  Snack - butter popcorn    Discussion: Met with pt and her daughter to provide new-pt diet education with an emphasis on ways to promote weight gain. We discussed possible side effects of treatment and ways to manage them with food. We discussed increasing the number of times she eats daily, how much she eats, and utilizing oral-nutrition-supplements daily, pt said she will do her best to do all of these things. We also discussed using snack stations to promote increased intake, I gave her a list of appropriate snacks and beverages to put in her snack boxes, pt and her daughter said they can make these and put them around her home. All questions and concerns were addressed and answered. I provided my contact information and encouraged her to call or schedule an appointment as needed.    Beatriz Arreola, MS,RD,LD-[place],CD-[place]  Registered Dietitian            "

## 2019-11-08 DIAGNOSIS — C79.51 METASTASIS TO BONE (HCC): ICD-10-CM

## 2019-11-08 DIAGNOSIS — C34.32 PRIMARY CANCER OF LEFT LOWER LOBE OF LUNG (HCC): Primary | ICD-10-CM

## 2019-11-08 DIAGNOSIS — C78.7 LIVER METASTASIS: ICD-10-CM

## 2019-11-08 RX ORDER — FENTANYL 50 UG/H
1 PATCH TRANSDERMAL
Qty: 10 PATCH | Refills: 0 | Status: SHIPPED | OUTPATIENT
Start: 2019-11-08 | End: 2020-01-14 | Stop reason: SDUPTHER

## 2019-11-08 RX ORDER — SUMATRIPTAN 25 MG/1
25 TABLET, FILM COATED ORAL AS NEEDED
Qty: 30 TABLET | Refills: 1 | Status: SHIPPED | OUTPATIENT
Start: 2019-11-08 | End: 2020-01-14

## 2019-11-08 RX ORDER — NITROFURANTOIN 25; 75 MG/1; MG/1
100 CAPSULE ORAL 2 TIMES DAILY
Qty: 60 CAPSULE | Refills: 5 | Status: SHIPPED | OUTPATIENT
Start: 2019-11-08 | End: 2020-01-13

## 2019-11-08 RX ORDER — CALCITRIOL 0.25 UG/1
0.25 CAPSULE, LIQUID FILLED ORAL DAILY
Qty: 30 CAPSULE | Refills: 5 | Status: SHIPPED | OUTPATIENT
Start: 2019-11-08 | End: 2020-03-10

## 2019-11-08 NOTE — TELEPHONE ENCOUNTER
Received phone call from Bharti with [facility] advising that this patient is needing a few refills on medications. She was also needing clarifications on a few meds. She states that patient takes her Flexeril - 2 tabs po q hs routinely at bedtime; She is NOT on Amlodipine; Lasix is once daily; and Macrobid was originally prescribed by Dr. Jones as BID for hx of bladder cancer along with recurrent UTI's and she has been on the BID dosing for Macrobid for at least the last 3 years. Will obtain Dr. Jones's records for review. Meds are loaded and ready to send. Medications in question were corrected as well.    Last OV: 10-4-19  Next OV: 11-12-19  Last Labs: 10-30-19

## 2019-11-11 RX ORDER — CYCLOBENZAPRINE HCL 10 MG
TABLET ORAL
Qty: 60 TABLET | Refills: 1 | Status: SHIPPED | OUTPATIENT
Start: 2019-11-11 | End: 2019-12-05 | Stop reason: SDUPTHER

## 2019-11-12 ENCOUNTER — OFFICE VISIT (OUTPATIENT)
Dept: ONCOLOGY | Facility: CLINIC | Age: 79
End: 2019-11-12

## 2019-11-12 ENCOUNTER — APPOINTMENT (OUTPATIENT)
Dept: ONCOLOGY | Facility: CLINIC | Age: 79
End: 2019-11-12

## 2019-11-12 ENCOUNTER — OFFICE VISIT (OUTPATIENT)
Dept: FAMILY MEDICINE CLINIC | Facility: CLINIC | Age: 79
End: 2019-11-12

## 2019-11-12 VITALS
SYSTOLIC BLOOD PRESSURE: 165 MMHG | TEMPERATURE: 98.5 F | BODY MASS INDEX: 18.96 KG/M2 | HEIGHT: 63 IN | OXYGEN SATURATION: 99 % | HEART RATE: 77 BPM | RESPIRATION RATE: 18 BRPM | DIASTOLIC BLOOD PRESSURE: 66 MMHG | WEIGHT: 107 LBS

## 2019-11-12 VITALS
TEMPERATURE: 98.5 F | HEIGHT: 63 IN | WEIGHT: 108 LBS | SYSTOLIC BLOOD PRESSURE: 167 MMHG | BODY MASS INDEX: 19.14 KG/M2 | HEART RATE: 80 BPM | DIASTOLIC BLOOD PRESSURE: 59 MMHG | RESPIRATION RATE: 18 BRPM

## 2019-11-12 DIAGNOSIS — C79.51 METASTASIS TO BONE (HCC): ICD-10-CM

## 2019-11-12 DIAGNOSIS — E03.8 OTHER SPECIFIED HYPOTHYROIDISM: ICD-10-CM

## 2019-11-12 DIAGNOSIS — G89.3 CHRONIC PAIN DUE TO NEOPLASM: ICD-10-CM

## 2019-11-12 DIAGNOSIS — C78.7 LIVER METASTASIS: ICD-10-CM

## 2019-11-12 DIAGNOSIS — C34.32 PRIMARY CANCER OF LEFT LOWER LOBE OF LUNG (HCC): Primary | ICD-10-CM

## 2019-11-12 DIAGNOSIS — C78.7 LIVER METASTASIS: Primary | ICD-10-CM

## 2019-11-12 DIAGNOSIS — I10 ESSENTIAL HYPERTENSION: ICD-10-CM

## 2019-11-12 PROCEDURE — G0463 HOSPITAL OUTPT CLINIC VISIT: HCPCS | Performed by: INTERNAL MEDICINE

## 2019-11-12 PROCEDURE — 99215 OFFICE O/P EST HI 40 MIN: CPT | Performed by: INTERNAL MEDICINE

## 2019-11-12 PROCEDURE — 99213 OFFICE O/P EST LOW 20 MIN: CPT | Performed by: FAMILY MEDICINE

## 2019-11-12 RX ORDER — LEVOTHYROXINE SODIUM 0.05 MG/1
50 TABLET ORAL DAILY
Qty: 30 TABLET | Refills: 1 | Status: SHIPPED | OUTPATIENT
Start: 2019-11-12 | End: 2020-03-20 | Stop reason: SDUPTHER

## 2019-11-12 RX ORDER — FENTANYL 50 UG/H
1 PATCH TRANSDERMAL
Qty: 10 PATCH | Refills: 0 | Status: CANCELLED | OUTPATIENT
Start: 2019-11-12

## 2019-11-12 NOTE — PROGRESS NOTES
Subjective   Ann Andre is a 79 y.o. female.   Chief Complaint   Patient presents with   • Pain     Pain Contact signed. Pain intake completed. INSPECT completed and is WNL.        History of Present Illness   Presents today for follow-up.  She has numerous medical problems however the primary thing I am treating her for this pain related to lung cancer with mets to bone and liver on PET scan.  Since I last saw her, she saw Dr. Batista, her oncologist and after reviewing his note, it looks like he has started her on Xgeva for bony metastases.  He gave her Fioricet for headaches.  Apparently she has hypercalcemia and he recommended avoiding all calcium supplements.  He is also pursuing DNA sequencing and immunohistochemistry.  She has home health services through [facility].    She reports that her pain remains well controlled on 150 mcg patch every 3 days.  She has discontinued all prior use of opioid tablets.  She describes feeling like she was on too much medication and having confusion.  She is content with her current level of pain control and is comfortable.  She has minimal pain with breathing.  She did ask that I restart her Flexeril that she takes at bedtime.  We were contacted by her home health agency and this was take care of it here to go.  She also had her fentanyl patches refilled 3 days ago.  Today she signed her pain contract.  Pain inventory indicates her pain is typically run a 4 out of 10.  Inspect was performed and reviewed.  It is consistent with what I know.  Dr. Mcgregor, her psychiatrist,  continues to prescribe her Lorazepam.  She reports no symptoms of depression.  She keeps her final patch is secured.  Her daughter is with her today and verifies all this.  She was not able to void into the cup for urinalysis.  She is weak from symptoms related to her metastatic cancer, therefore will not drag this out and will do a urine drug screen at the next visit.    Patient Active  Problem List    Diagnosis Date Noted   • Abnormal finding on mammography 09/24/2019   • Abdominal pain 09/24/2019   • Allergic to bees 09/24/2019   • Blind left eye 09/24/2019   • Candidiasis of mouth 09/24/2019   • Carcinoma of bladder (CMS/HCC) 09/24/2019   • Chronic constipation 09/24/2019   • Chronic pain 09/24/2019   • Chronic low back pain 09/24/2019   • Degeneration of lumbar intervertebral disc 09/24/2019   • Dementia (CMS/HCC) 09/24/2019   • Depressive disorder 09/24/2019   • Dysphagia 09/24/2019   • Fatigue 09/24/2019   • Gastroesophageal reflux disease 09/24/2019   • Gastro-esophageal reflux disease with esophagitis 09/24/2019   • Generalized osteoarthritis 09/24/2019   • Generalized headache 09/24/2019   • Hypothyroidism 09/24/2019   • Insomnia 09/24/2019   • Leukocytosis 09/24/2019   • Nausea 09/24/2019   • Malignant tumor of urinary bladder (CMS/HCC) 09/24/2019   • Neuropathy 09/24/2019   • Overactive bladder 09/24/2019   • Senile osteopenia 09/24/2019   • Stress incontinence of urine 09/24/2019   • Urethral stenosis 09/24/2019   • Urinary incontinence 09/24/2019   • Vomiting 09/24/2019   • Chronic lymphocytic leukemia (CMS/HCC) 09/24/2019   • Hypertensive disorder 09/24/2019   • Liver metastasis (CMS/HCC) 09/24/2019   • Lymphadenopathy 09/24/2019   • Metastasis to bone (CMS/HCC) 09/24/2019   • Primary cancer of left lower lobe of lung (CMS/HCC) 08/30/2019     Note Last Updated: 11/11/2019     mets to bone and liver on PET scan     • CLL (chronic lymphocytic leukemia) (CMS/HCC) 08/29/2019     Note Last Updated: 11/11/2019     Followed by Dr. Batista     • Anemia of chronic kidney failure, unspecified stage 08/29/2019   • Hypercalcemia 08/29/2019   • Iron deficiency 08/29/2019   • Chronic headaches 08/29/2019   • Dizziness 08/29/2019   • Fall 06/27/2019   • Chronic kidney disease (CKD) 06/27/2019   • Anemia of renal disease 06/27/2019   • Hypertension 06/27/2019   • Polypharmacy 06/27/2019   •  Protein-calorie malnutrition, moderate (CMS/HCC) 06/27/2019   • Syncope 02/04/2019   • Shortness of breath 06/12/2017   • Cardiac murmur 06/12/2017   • Migraine 01/27/2017           Past Surgical History:   Procedure Laterality Date   • APPENDECTOMY     • CHOLECYSTECTOMY     • HYSTERECTOMY     • JOINT REPLACEMENT     • OOPHORECTOMY     • SHOULDER SURGERY Left      Current Outpatient Medications on File Prior to Visit   Medication Sig   • Biotin 5000 MCG capsule Take  by mouth Daily.   • butalbital-acetaminophen-caffeine (ESGIC) -40 MG per tablet Take 1 tablet by mouth 2 (Two) Times a Day As Needed for Headache.   • Cranberry-Cholecalciferol 4200-500 MG-UNIT capsule Take  by mouth Daily.   • cyclobenzaprine (FLEXERIL) 10 MG tablet TAKE ONE TABLET BY MOUTH TWICE DAILY   • docusate sodium (COLACE) 100 MG capsule Take 100 mg by mouth Daily.   • DULoxetine (CYMBALTA) 60 MG capsule Take 60 mg by mouth Daily.   • EPINEPHrine (EPIPEN) 0.3 MG/0.3ML solution auto-injector injection Daily.   • fentaNYL (DURAGESIC) 50 MCG/HR patch Place 1 patch on the skin as directed by provider Every 72 (Seventy-Two) Hours.   • ferrous sulfate 325 (65 FE) MG tablet Take 1 tablet by mouth Daily With Breakfast.   • fluticasone (FLONASE) 50 MCG/ACT nasal spray INHALE TWO SPRAYS EACH NOSTRIL EVERY DAY   • furosemide (LASIX) 20 MG tablet Take 20 mg by mouth Daily.   • hydrALAZINE (APRESOLINE) 25 MG tablet Take 1 tablet by mouth 3 (Three) Times a Day.   • lidocaine (XYLOCAINE,URO-JET) 2 % gel apply TO mucosal membrane THREE TIMES DAILY AS NEEDED AS directed   • LORazepam (ATIVAN) 0.5 MG tablet Take 0.5 mg by mouth As Needed.   • meclizine 25 MG chewable tablet chewable tablet Chew 25 mg 2 (Two) Times a Day As Needed.   • melatonin 5 MG tablet tablet Take 5 mg by mouth At Night As Needed.   • Naloxegol Oxalate (MOVANTIK) 25 MG tablet Take 25 mg by mouth Every Morning.   • NAMZARIC 28-10 MG capsule sustained-release 24 hr Take 1 capsule by  mouth Daily.   • nitrofurantoin, macrocrystal-monohydrate, (MACROBID) 100 MG capsule Take 1 capsule by mouth 2 (Two) Times a Day.   • omeprazole (priLOSEC) 40 MG capsule Take 40 mg by mouth 2 (Two) Times a Day.   • oxybutynin XL (DITROPAN-XL) 10 MG 24 hr tablet Take 1 tablet by mouth Daily.   • polyethylene glycol (MIRALAX) packet Take 17 g by mouth As Needed.   • potassium chloride (K-DUR) 10 MEQ CR tablet Take 10 mEq by mouth 2 (Two) Times a Day.   • predniSONE (DELTASONE) 5 MG tablet Take 5 mg by mouth Daily.   • RESTASIS 0.05 % ophthalmic emulsion 2 (Two) Times a Day.   • rOPINIRole (REQUIP) 1 MG tablet Take 1 tablet by mouth Every Night. Take 1 hour before bedtime.   • sertraline (ZOLOFT) 100 MG tablet Take 100 mg by mouth Daily.   • SUMAtriptan (IMITREX) 25 MG tablet Take 1 tablet by mouth As Needed for Migraine. 1 tab po at onset of HA. If no relief in two hours, may repeat x 1.   • calcitriol (ROCALTROL) 0.25 MCG capsule Take 1 capsule by mouth Daily.   • [DISCONTINUED] levothyroxine (SYNTHROID, LEVOTHROID) 50 MCG tablet Take 50 mcg by mouth Daily.     No current facility-administered medications on file prior to visit.      Allergies   Allergen Reactions   • Inderal  [Propranolol] Unknown (See Comments)   • L-Methylfolate-Algae-B12-B6 Unknown (See Comments)   • Morphine Nausea And Vomiting   • Rofecoxib Other (See Comments)   • Topiramate Other (See Comments)     Social History     Socioeconomic History   • Marital status:      Spouse name: Not on file   • Number of children: Not on file   • Years of education: Not on file   • Highest education level: Not on file   Tobacco Use   • Smoking status: Former Smoker     Packs/day: 1.50     Years: 30.00     Pack years: 45.00     Types: Cigarettes     Last attempt to quit: 2014     Years since quittin.3   • Smokeless tobacco: Never Used   Substance and Sexual Activity   • Alcohol use: No     Frequency: Never   • Drug use: No   • Sexual activity:  "Defer   Social History Narrative    She lives alone in [place], is , retired and has three grown children. She used to smoke about 1 1/2 ppd for 30 years and quit in 2014. She reports a history of heavy alcohol use for about 40 years and now drinks socially. She does not use recreational drugs.     Family History   Problem Relation Age of Onset   • Breast cancer Maternal Aunt    • Leukemia Maternal Aunt      The following portions of the patient's history were reviewed and updated as appropriate: allergies, current medications, past family history, past medical history, past social history, past surgical history and problem list.    Review of Systems   Constitutional: Negative for chills and fever.   Respiratory: Negative for cough and shortness of breath.    Cardiovascular: Negative for chest pain, palpitations and leg swelling.   Gastrointestinal: Negative for abdominal pain.   Neurological: Negative for seizures, syncope, light-headedness and headache.       Objective   /66 (BP Location: Left arm, Patient Position: Sitting, Cuff Size: Adult)   Pulse 77   Temp 98.5 °F (36.9 °C) (Oral)   Resp 18   Ht 160 cm (63\")   Wt 48.5 kg (107 lb)   SpO2 99%   BMI 18.95 kg/m²   Physical Exam   Constitutional: She is oriented to person, place, and time. She appears well-nourished. No distress.   Thin, elderly WF, NAD   HENT:   Head: Normocephalic and atraumatic.   Eyes: Conjunctivae and EOM are normal.   Neck: Normal range of motion.   Cardiovascular: Normal rate.   Pulmonary/Chest: Effort normal and breath sounds normal.   Abdominal: Soft.   Scaphoid abdomen   Musculoskeletal: Normal range of motion.   Neurological: She is alert and oriented to person, place, and time.   Skin: Skin is warm and dry. No rash noted.   Psychiatric: She has a normal mood and affect. Her speech is normal and behavior is normal. Judgment and thought content normal. Cognition and memory are not impaired.           Assessment/Plan "   Diagnoses and all orders for this visit:    1. Primary cancer of left lower lobe of lung (CMS/HCC) (Primary)    2. Essential hypertension    3. Liver metastasis (CMS/HCC)    4. Chronic pain due to neoplasm    5. Metastasis to bone (CMS/HCC)    Other orders  -     Cancel: fentaNYL (DURAGESIC) 50 MCG/HR patch; Place 1 patch on the skin as directed by provider Every 72 (Seventy-Two) Hours.  Dispense: 10 patch; Refill: 0    Today, Ann is stable.  Her final patch was just refilled 3 days ago.  We will have her continue all current medications at the present time.  I asked her to call with any problems or concerns before next visit.  I explained to her and her daughter that her pain will be to see her monthly and anticipate refilling her opioid at those visits.  Will complete rest of intake paperwork at next visit.         Return in about 4 weeks (around 12/10/2019).

## 2019-11-12 NOTE — PROGRESS NOTES
HEMATOLOGY ONCOLOGY FOLLOW UP        Patient name: Ann Andre  : 1940  MRN: [id number]  Primary Care Physician: Rochelle Fletcher MD  Referring Physician: No ref. provider found  Reason For Consult:     Chief Complaint   Patient presents with   • Follow-up     Metastatic lung cancer   Chronic lymphocytic leukemia  Anemia    History of Present Illness:  Ms. Andre is a pleasant 78-year-old female who underwent lab work at her Primary Care Physician’s office on 10/27/14 that showed leukocytosis with a white count of 12.1 and normal hemoglobin and platelet count.  Her absolute lymphocyte count is 8,000 (elevated).  ESR was normal at 6.  The patient was therefore referred for hematology for further evaluation of the lymphocytosis.  She also has chronic kidney disease and her creatinine was 1.8 on 10/29/14.      14 - On initial evaluation in our office, WBC 12.2, hemoglobin 10.8, platelet count 311,000, lymphocyte count is almost 6,000.  The patient denied any fever, chills, night sweats, weight loss or lymph node swelling.  She does report having chronic anemia and also had received B12 injections in the past.  She reports history of mild chronic kidney disease.    • 14 - Creatinine 2.3, BUN 27, iron saturation 22%, TIBC 286, serum iron 64, ferritin 232, B12 566, reticulocyte count 1.3%.   • 14 - Flow cytometry:  Immunophenotyping identifies CD5 positive, CD23 positive, Monoclonal copper B cell population representing 24% of the total events.  CD 38 was negative.  This represents CLL.   • 1/22/15 - Bone marrow biopsy:  Variable cellular bone marrow with involvement by SLL/CLL in a nodular pattern.  Flow cytometry involvement 24% by CLL/SLL.  Nodular pattern of spread is considered a good prognostic finding.   Examination with PAS stain revealed several interstitial nodules which occupy 5% to 10% of the total marrow cellularity.  Cytogenetics showed normal karyotype.  CLL  FISH panel:  Negative.  Specific probes for TP53 on chromosome 17, NABIL on chromosome 11, chromosome 12 and chromosome 13 were negative.  This indicates good prognosis.    • 2/12/15 - WBC 11.1, hemoglobin 10.9, platelet count 360,000, MCV 98.6.   • 7/14/15 - WBC 9.2, hemoglobin 10.1, platelet count 306,000, MCV 94.1.  • 4/21/15 - Iron saturation 21%, TIBC 264,000, serum iron 56, ferritin 199, EPO level 6, reticulocyte count 1.6%, haptoglobin 124.    • 10/20/15 - WBC 10.7, hemoglobin 11.2, platelet count 348,000.  • 2/16/16 - Iron saturation 25%, TIBC 250, serum iron 63.  WBC 7.7, hemoglobin 10.9, platelet count 293,000, MCV 91.3, lymphocytes 3,900.  • 3/14/16 - Creatinine 1.48.  • 8/30/16 - Lymphocyte count 4,370.  Iron saturation 26%, TIBC 262, serum iron 67.    • 9/6/16 - WBC 11.5, hemoglobin 11.6, platelet count 334,000, MCV 93.6.  • 1/2/17 - WBC 7.2, hemoglobin 10.6, platelet count 310,000, MCV 97.2.    • 5/22/17 - WBC 8.9, hemoglobin 10.7, platelet count 294,000, MCV 99.7.  Absolute lymphocyte count 3.66 (H).  Iron 50, TIBC 205, iron saturation 20%.    • 3/27/18 - WBC 10.1, hemoglobin 11, MCV 96.7, platelet count 301,000.  Serum iron 80, iron saturation 31%, TIBC 256.  Creatinine 1.5, BUN 27.    • 9/20/18 - WBC 9, hemoglobin 10, platelet count 284,000, .5.  Absolute lymphocyte count 4250 (H).  Ferritin 351.  Iron 51, TIBC 197.  • 12/28/18 - Creatinine 1.7, BUN 31.  Calcium 10.7 (H), albumin 4.1.  WBC 8.7, hemoglobin 10.3, platelet count 375,000, .6.    • 1/8/19 - TSH 1.74.  Ferritin 379.  Iron saturation 14%, TIBC 261, serum iron 37 (L).  SPEP appears normal.  Serum immunofixation:  No monoclonal band.  PTH intact 63 (normal range 14-64).  Serum calcium 10.3 (N).    • 3/12/19 - WBC 7.6, hemoglobin 11.5, platelet count 312,000, .9.  Creatinine 1.38, BUN 22, calcium 10.9 (H).  Vitamin D level 49 (N).  • 7/22/2019 CT scan chest without contrast: There is a new mass, multilobulated within the  left lower lobe, measures 3.1 x 2.3 cm.  Adjacent scarring is present as well.  Evidence of prior granulomatous disease, no obvious mediastinal or hilar lymphadenopathy..  Aneurysm of the ascending thoracic aorta.  CT scan was done in the context of left rib cage pain after having a fall.  • 7/18/2019 MRI brain: Possible enhancing lesion seen only on coronal image 8 measures 4.4 mm within the right precentral gyrus.  Per the radiologist, this could be an old infarct or metastases or pulsation artifact.  No acute infarct.  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  FDG avid left hilar lymphadenopathy with SUV of 8.2..  Multiple FDG avid hepatic and osseous lesions compatible with the metastasis.  There are approximately 15-20 FDG avid osseous lesions compatible with mets.  Index lesion involving the left posterior eighth rib demonstrates lytic changes with SUV of 12.9.  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 The biopsy is stained via immunohistochemical technique utilizing appropriate controls for the presence of CK7, CK5/6, p63, TTF-1, and napsin A. The tumor cells are strongly positive for all markers tested and indicate both a squamous and an adenocarcinoma component.  • 9/23/2019 WBC 9.3, hemoglobin 10, platelets 306, .3  • 9/24/2019 brain MRI with and without contrast: No evidence of any brain metastasis.  • October 7, 2019 Next generation sequencing testing by [facility]:= PDL 1 by IHC 60%.  Testing is negative for Sylvester BRAF, ROS 1, MET, and NTRK, K-walt, ERBB 2, RET,  STK11,TP53.. 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  • 10/30/2019 patient received cycle 1 of Keytruda.  TSH 4.44 high, free T4 1.05  • 10/30/2019 WBC 6.7, hemoglobin 10.0, platelets 360, creatinine 1.33,          Subjective:11/12/19  Patient is here for a follow up appointment. She is accompanied today by her daughter. Pt states she had cycle one of immunotherapy on 10/30/2019. She is due for cycle 2 on 11/20/2019. She is tolerating well, with no side effects.  Denies any diarrhea or rashes or breathing trouble.  Patient states that she was taking thyroid supplements in the past.  But she had stopped taking them.  So she does acknowledge having pre-existing thyroid problems.  The following portions of the patient's history were reviewed and updated as appropriate: allergies, current medications, past family history, past medical history, past social history, past surgical history and problem list.         Past Medical History:   Diagnosis Date   • Anemia of renal disease 6/27/2019   • Arthritis    • Cancer (CMS/HCC)    • Chronic back pain    • Chronic kidney disease (CKD)    • Depression    • Elevated cholesterol    • Gastroesophageal reflux disease 9/24/2019   • Hypertension    • Migraine    • Polypharmacy 6/27/2019   • Protein-calorie malnutrition, moderate (CMS/HCC) 6/27/2019   • Restless leg syndrome    • Stroke (CMS/HCC)        Past Surgical History:   Procedure Laterality Date   • APPENDECTOMY     • CHOLECYSTECTOMY     • HYSTERECTOMY     • JOINT REPLACEMENT     • OOPHORECTOMY     • SHOULDER SURGERY Left          Current Outpatient Medications:   •  Biotin 5000 MCG capsule, Take  by mouth Daily., Disp: , Rfl:   •  butalbital-acetaminophen-caffeine (ESGIC) -40 MG per tablet, Take 1 tablet by mouth 2 (Two) Times a Day As Needed for Headache., Disp: 60 tablet, Rfl: 0  •  calcitriol  (ROCALTROL) 0.25 MCG capsule, Take 1 capsule by mouth Daily., Disp: 30 capsule, Rfl: 5  •  Cranberry-Cholecalciferol 4200-500 MG-UNIT capsule, Take  by mouth Daily., Disp: , Rfl:   •  cyclobenzaprine (FLEXERIL) 10 MG tablet, TAKE ONE TABLET BY MOUTH TWICE DAILY, Disp: 60 tablet, Rfl: 1  •  docusate sodium (COLACE) 100 MG capsule, Take 100 mg by mouth Daily., Disp: , Rfl:   •  DULoxetine (CYMBALTA) 60 MG capsule, Take 60 mg by mouth Daily., Disp: , Rfl:   •  EPINEPHrine (EPIPEN) 0.3 MG/0.3ML solution auto-injector injection, Daily., Disp: , Rfl:   •  fentaNYL (DURAGESIC) 50 MCG/HR patch, Place 1 patch on the skin as directed by provider Every 72 (Seventy-Two) Hours., Disp: 10 patch, Rfl: 0  •  ferrous sulfate 325 (65 FE) MG tablet, Take 1 tablet by mouth Daily With Breakfast., Disp: 30 tablet, Rfl: 5  •  fluticasone (FLONASE) 50 MCG/ACT nasal spray, INHALE TWO SPRAYS EACH NOSTRIL EVERY DAY, Disp: , Rfl: 1  •  furosemide (LASIX) 20 MG tablet, Take 20 mg by mouth Daily., Disp: , Rfl: 3  •  hydrALAZINE (APRESOLINE) 25 MG tablet, Take 1 tablet by mouth 3 (Three) Times a Day., Disp: 90 tablet, Rfl: 5  •  levothyroxine (SYNTHROID, LEVOTHROID) 50 MCG tablet, Take 50 mcg by mouth Daily., Disp: , Rfl:   •  lidocaine (XYLOCAINE,URO-JET) 2 % gel, apply TO mucosal membrane THREE TIMES DAILY AS NEEDED AS directed, Disp: , Rfl: 0  •  LORazepam (ATIVAN) 0.5 MG tablet, Take 0.5 mg by mouth As Needed., Disp: , Rfl: 1  •  meclizine 25 MG chewable tablet chewable tablet, Chew 25 mg 2 (Two) Times a Day As Needed., Disp: , Rfl:   •  melatonin 5 MG tablet tablet, Take 5 mg by mouth At Night As Needed., Disp: , Rfl:   •  Naloxegol Oxalate (MOVANTIK) 25 MG tablet, Take 25 mg by mouth Every Morning., Disp: , Rfl:   •  NAMZARIC 28-10 MG capsule sustained-release 24 hr, Take 1 capsule by mouth Daily., Disp: , Rfl: 3  •  nitrofurantoin, macrocrystal-monohydrate, (MACROBID) 100 MG capsule, Take 1 capsule by mouth 2 (Two) Times a Day., Disp: 60  capsule, Rfl: 5  •  omeprazole (priLOSEC) 40 MG capsule, Take 40 mg by mouth 2 (Two) Times a Day., Disp: , Rfl:   •  oxybutynin XL (DITROPAN-XL) 10 MG 24 hr tablet, Take 1 tablet by mouth Daily., Disp: 30 tablet, Rfl: 5  •  polyethylene glycol (MIRALAX) packet, Take 17 g by mouth As Needed., Disp: , Rfl:   •  potassium chloride (K-DUR) 10 MEQ CR tablet, Take 10 mEq by mouth 2 (Two) Times a Day., Disp: , Rfl: 2  •  predniSONE (DELTASONE) 5 MG tablet, Take 5 mg by mouth Daily., Disp: , Rfl:   •  RESTASIS 0.05 % ophthalmic emulsion, 2 (Two) Times a Day., Disp: , Rfl: 6  •  rOPINIRole (REQUIP) 1 MG tablet, Take 1 tablet by mouth Every Night. Take 1 hour before bedtime., Disp: 30 tablet, Rfl: 5  •  sertraline (ZOLOFT) 100 MG tablet, Take 100 mg by mouth Daily., Disp: , Rfl:   •  SUMAtriptan (IMITREX) 25 MG tablet, Take 1 tablet by mouth As Needed for Migraine. 1 tab po at onset of HA. If no relief in two hours, may repeat x 1., Disp: 30 tablet, Rfl: 1  •  levothyroxine (SYNTHROID, LEVOTHROID) 50 MCG tablet, Take 1 tablet by mouth Daily., Disp: 30 tablet, Rfl: 1    Allergies   Allergen Reactions   • Inderal  [Propranolol] Unknown (See Comments)   • L-Methylfolate-Algae-B12-B6 Unknown (See Comments)   • Morphine Nausea And Vomiting   • Rofecoxib Other (See Comments)   • Topiramate Other (See Comments)       Family History   Problem Relation Age of Onset   • Breast cancer Maternal Aunt    • Leukemia Maternal Aunt        Cancer-related family history includes Breast cancer in her maternal aunt.    Social History     Tobacco Use   • Smoking status: Former Smoker     Packs/day: 1.50     Years: 30.00     Pack years: 45.00     Types: Cigarettes     Last attempt to quit: 2014     Years since quittin.3   • Smokeless tobacco: Never Used   Substance Use Topics   • Alcohol use: No     Frequency: Never   • Drug use: No         I have reviewed the history of present illness, past medical history, family history, social  "history, lab results, all notes and other records since the patient was last seen on  Visit date not found.    ROS:     Review of Systems   Constitutional: Positive for fatigue and unexpected weight change (have lost #20 pounds in the past year). Negative for appetite change, chills and fever.   HENT: Negative for ear pain, mouth sores, nosebleeds and sore throat.    Eyes: Negative for photophobia and visual disturbance.   Respiratory: Negative for shortness of breath, wheezing and stridor.    Cardiovascular: Negative for chest pain and palpitations.   Gastrointestinal: Negative for abdominal pain, diarrhea, nausea and vomiting.   Endocrine: Negative for cold intolerance and heat intolerance.   Genitourinary: Negative for dysuria and hematuria.   Musculoskeletal: Positive for myalgias. Negative for joint swelling and neck stiffness.        Bilateral rib pain   Skin: Negative for color change and rash.   Neurological: Positive for headaches. Negative for seizures and syncope.   Hematological: Negative for adenopathy.        No obvious bleeding   Psychiatric/Behavioral: Negative for agitation, confusion, hallucinations and sleep disturbance.       Objective:    Vitals:    11/12/19 0950   BP: 167/59   Pulse: 80   Resp: 18   Temp: 98.5 °F (36.9 °C)   TempSrc: Oral   Weight: 49 kg (108 lb)   Height: 160 cm (63\")   PainSc:   4   PainLoc: Comment: right shoulder     ECOG  (1) Restricted in physically strenuous activity, ambulatory and able to do work of light nature    Physical Exam:   Physical Exam   Constitutional: She is oriented to person, place, and time. She appears well-developed and well-nourished. No distress.   Somewhat thin appearing elderly female   HENT:   Head: Normocephalic and atraumatic.   Eyes: Conjunctivae and EOM are normal. Right eye exhibits no discharge. Left eye exhibits no discharge. No scleral icterus.   Neck: Normal range of motion. Neck supple. No thyromegaly present.   Cardiovascular: Normal " rate, regular rhythm and normal heart sounds. Exam reveals no gallop and no friction rub.   Pulmonary/Chest: Effort normal. No respiratory distress. She has no wheezes.   Decreased air entry bilaterally in the lungs.   Abdominal: Soft. Bowel sounds are normal. She exhibits no mass. There is no tenderness. There is no rebound and no guarding.   Musculoskeletal: Normal range of motion. She exhibits no tenderness.   Lymphadenopathy:     She has no cervical adenopathy.   Neurological: She is alert and oriented to person, place, and time. She exhibits normal muscle tone.   Skin: Skin is warm. No rash noted. She is not diaphoretic. No erythema.   Psychiatric: She has a normal mood and affect. Her behavior is normal.   Nursing note and vitals reviewed.            Lab Results - Last 18 Months   Lab Units 10/30/19  1149 09/12/19  0755 08/30/19  1150   WBC 10*3/mm3 6.77 8.70 6.99   HEMOGLOBIN g/dL 10.0* 10.6* 9.8*   HEMATOCRIT % 31.5* 31.7* 30.2*   PLATELETS 10*3/mm3 360 286 271   MCV fL 99.7* 96.9 102.0*     Lab Results - Last 18 Months   Lab Units 10/30/19  1149 06/27/19  0322 06/27/19  0044   SODIUM mmol/L 137 136 135*   POTASSIUM mmol/L 5.0 3.8 3.8   CHLORIDE mmol/L 101 101 97*   CO2 mmol/L 23.0 25.0 25.0   BUN mg/dL 26* 25* 24*   CREATININE mg/dL 1.33* 1.50* 1.50*   CALCIUM mg/dL 10.0 9.5 9.8   BILIRUBIN mg/dL 0.2  --  0.6   ALK PHOS U/L 109  --  63   ALT (SGPT) U/L 11  --  18   AST (SGOT) U/L 24  --  23   GLUCOSE mg/dL 97 117* 119*       Lab Results   Component Value Date    GLUCOSE 97 10/30/2019    BUN 26 (H) 10/30/2019    CREATININE 1.33 (H) 10/30/2019    EGFRIFNONA 38 (L) 10/30/2019    BCR 19.5 10/30/2019    K 5.0 10/30/2019    CO2 23.0 10/30/2019    CALCIUM 10.0 10/30/2019    ALBUMIN 4.20 10/30/2019    AST 24 10/30/2019    ALT 11 10/30/2019       Lab Results - Last 18 Months   Lab Units 09/12/19  0755   INR  0.96   APTT seconds 20.5*       Lab Results   Component Value Date    IRON 45 08/30/2019    TIBC 277  08/30/2019    FERRITIN 295.00 08/30/2019       Lab Results   Component Value Date    FOLATE 9.60 08/30/2019       No results found for: OCCULTBLD    No results found for: RETICCTPCT    Lab Results   Component Value Date    QPUVUAMI07 889 08/30/2019     No results found for: SPEP, UPEP  No results found for: LDH, URICACID  No results found for: JOSAFAT, RF, SEDRATE  No results found for: FIBRINOGEN, HAPTOGLOBIN  Lab Results   Component Value Date    PTT 20.5 (L) 09/12/2019    INR 0.96 09/12/2019     No results found for:   No results found for: CEA  No components found for: CA-19-9  No results found for: PSA          Assessment/Plan     Assessment;  1. Metastatic lung cancer: Stage IV .she presented with a left lower lobe lung mass.  PET scan shows liver metastases and bone metastases.  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  Adenosquamous carcinoma tend to be more aggressive than AdenoCa or squamous carcinomas.  Next generation sequencing testing was performed and it failed to show any targetable mutations.  IHC shows PDL 1 score of 60%.  2. Lung cancer PDL 1 score 60%  3. Chronic lymphocytic leukemia:  The patient does not have any B symptoms or lymph node swelling.  She is EMERSON Stage 0.  Her bone marrow biopsy previously showed nodular involvement by CLL with nodular deposits occupying 5-10% of marrow cellularity.  FISH panel was negative.  Cytogenetics showed normal karyotype.  Overall, patient did not have any poor prognostic features.  Recent CBC shows a stable white count.  Platelet count is also normal and physical exam does not reveal any lymphadenopathy and she does not have any B symptoms.    4. Enhancing lesion MRI brain: Only 4.4 mm.  It does not appear suspicious for metastasis.  5. History of hypothyroidism: Was not on any medications.  Re-started levothyroxine on 11/12/2019  6. Anemia of chronic kidney disease:  Her hemoglobin remains above 10.  She also had iron deficiency and is on  iron supplements.   Iron stores are adequate.  She has been taking two iron tablets daily.    7. History of hypercalcemia: PET scan now shows bone metastasis.  PTH is in the high normal range.  Patient has CKD.  .  Vitamin D level is normal.  Will start patient on Xgeva.    8. History of mild iron deficiency.  She is on iron supplement.    9. Hx of Skin lesions:  She is seeing a dermatologist.  She had a skin biopsy.  She also reports having skin cancer excised on her right lower extremity with Dr. Buckley.    10. History of dizziness:  She had a thorough work up.  She saw cardiology.  She also saw an ophthalmologist.  She had a brain MRI which was negative.  11. History of headaches:  She has history of migraines.      PLAN:    1. Continue immunotherapy.  Patient is tolerating Keytruda well..  2. Continue Xgeva for bone metastases.  3. Start Synthroid 50 mcg for hypothyroidism.  4. Regarding hypercalcemia, advised patient told hold off her vitamin D and calcium supplements. .                Follow-up in 4  weeks      There are no diagnoses linked to this encounter.  No orders of the defined types were placed in this encounter.                    Thank you very much for providing the opportunity to participate in this patient’s care. Please do not hesitate to call if there are any other questions.    I have reviewed all relevant labs results, imaging,Outside reports,notes, vitals, medications and plan with the patient today.    Portions of the note have been Scribed by medical assistant/Nurse.  I have reviewed and made changes accordingly.

## 2019-11-20 ENCOUNTER — HOSPITAL ENCOUNTER (OUTPATIENT)
Dept: ONCOLOGY | Facility: HOSPITAL | Age: 79
Setting detail: INFUSION SERIES
End: 2019-11-20

## 2019-11-21 DIAGNOSIS — C34.32 PRIMARY CANCER OF LEFT LOWER LOBE OF LUNG (HCC): ICD-10-CM

## 2019-11-21 RX ORDER — SODIUM CHLORIDE 9 MG/ML
250 INJECTION, SOLUTION INTRAVENOUS ONCE
Status: CANCELLED | OUTPATIENT
Start: 2019-11-22

## 2019-11-22 ENCOUNTER — HOSPITAL ENCOUNTER (OUTPATIENT)
Dept: ONCOLOGY | Facility: HOSPITAL | Age: 79
Setting detail: INFUSION SERIES
Discharge: HOME | End: 2019-11-22

## 2019-11-22 ENCOUNTER — DOCUMENTATION (OUTPATIENT)
Dept: ONCOLOGY | Facility: HOSPITAL | Age: 79
End: 2019-11-22

## 2019-11-22 VITALS
TEMPERATURE: 97.7 F | SYSTOLIC BLOOD PRESSURE: 122 MMHG | BODY MASS INDEX: 17.89 KG/M2 | HEART RATE: 71 BPM | OXYGEN SATURATION: 95 % | RESPIRATION RATE: 18 BRPM | WEIGHT: 101 LBS | HEIGHT: 63 IN | DIASTOLIC BLOOD PRESSURE: 64 MMHG

## 2019-11-22 DIAGNOSIS — C34.32 PRIMARY CANCER OF LEFT LOWER LOBE OF LUNG (HCC): Primary | ICD-10-CM

## 2019-11-22 DIAGNOSIS — C79.51 METASTASIS TO BONE (HCC): ICD-10-CM

## 2019-11-22 LAB
ALBUMIN SERPL-MCNC: 4.1 G/DL (ref 3.5–5.2)
ALBUMIN/GLOB SERPL: 1.4 G/DL
ALP SERPL-CCNC: 119 U/L (ref 39–117)
ALT SERPL W P-5'-P-CCNC: 33 U/L (ref 1–33)
ANION GAP SERPL CALCULATED.3IONS-SCNC: 11 MMOL/L (ref 5–15)
AST SERPL-CCNC: 41 U/L (ref 1–32)
BASOPHILS # BLD AUTO: 0.07 10*3/MM3 (ref 0–0.2)
BASOPHILS NFR BLD AUTO: 1 % (ref 0–1.5)
BILIRUB SERPL-MCNC: <0.2 MG/DL (ref 0.2–1.2)
BUN BLD-MCNC: 31 MG/DL (ref 8–23)
BUN/CREAT SERPL: 24.4 (ref 7–25)
CALCIUM SPEC-SCNC: 9.6 MG/DL (ref 8.6–10.5)
CHLORIDE SERPL-SCNC: 99 MMOL/L (ref 98–107)
CO2 SERPL-SCNC: 24 MMOL/L (ref 22–29)
CREAT BLD-MCNC: 1.27 MG/DL (ref 0.57–1)
DEPRECATED RDW RBC AUTO: 44.4 FL (ref 37–54)
EOSINOPHIL # BLD AUTO: 0.27 10*3/MM3 (ref 0–0.4)
EOSINOPHIL NFR BLD AUTO: 3.7 % (ref 0.3–6.2)
ERYTHROCYTE [DISTWIDTH] IN BLOOD BY AUTOMATED COUNT: 12.8 % (ref 12.3–15.4)
GFR SERPL CREATININE-BSD FRML MDRD: 40 ML/MIN/1.73
GLOBULIN UR ELPH-MCNC: 2.9 GM/DL
GLUCOSE BLD-MCNC: 112 MG/DL (ref 65–99)
HCT VFR BLD AUTO: 32.1 % (ref 34–46.6)
HGB BLD-MCNC: 10.4 G/DL (ref 12–15.9)
LYMPHOCYTES # BLD AUTO: 1.17 10*3/MM3 (ref 0.7–3.1)
LYMPHOCYTES NFR BLD AUTO: 15.9 % (ref 19.6–45.3)
MCH RBC QN AUTO: 31.8 PG (ref 26.6–33)
MCHC RBC AUTO-ENTMCNC: 32.4 G/DL (ref 31.5–35.7)
MCV RBC AUTO: 98.2 FL (ref 79–97)
MONOCYTES # BLD AUTO: 0.6 10*3/MM3 (ref 0.1–0.9)
MONOCYTES NFR BLD AUTO: 8.2 % (ref 5–12)
NEUTROPHILS # BLD AUTO: 5.24 10*3/MM3 (ref 1.7–7)
NEUTROPHILS NFR BLD AUTO: 71.2 % (ref 42.7–76)
PLATELET # BLD AUTO: 381 10*3/MM3 (ref 140–450)
PMV BLD AUTO: 8 FL (ref 6–12)
POTASSIUM BLD-SCNC: 5 MMOL/L (ref 3.5–5.2)
PROT SERPL-MCNC: 7 G/DL (ref 6–8.5)
RBC # BLD AUTO: 3.27 10*6/MM3 (ref 3.77–5.28)
SODIUM BLD-SCNC: 134 MMOL/L (ref 136–145)
WBC NRBC COR # BLD: 7.35 10*3/MM3 (ref 3.4–10.8)

## 2019-11-22 PROCEDURE — 85025 COMPLETE CBC W/AUTO DIFF WBC: CPT | Performed by: NURSE PRACTITIONER

## 2019-11-22 PROCEDURE — 36415 COLL VENOUS BLD VENIPUNCTURE: CPT | Performed by: NURSE PRACTITIONER

## 2019-11-22 PROCEDURE — 25010000002 PEMBROLIZUMAB 100 MG/4ML SOLUTION 4 ML VIAL: Performed by: NURSE PRACTITIONER

## 2019-11-22 PROCEDURE — 96413 CHEMO IV INFUSION 1 HR: CPT | Performed by: NURSE PRACTITIONER

## 2019-11-22 PROCEDURE — 80053 COMPREHEN METABOLIC PANEL: CPT | Performed by: NURSE PRACTITIONER

## 2019-11-22 RX ORDER — DIPHENHYDRAMINE HCL 12.5MG/5ML
LIQUID (ML) ORAL 4 TIMES DAILY PRN
COMMUNITY
End: 2020-01-14

## 2019-11-22 RX ORDER — FEXOFENADINE HCL 180 MG/1
180 TABLET ORAL DAILY PRN
COMMUNITY
End: 2020-03-20 | Stop reason: SDUPTHER

## 2019-11-22 RX ORDER — BUTALBITAL, ACETAMINOPHEN AND CAFFEINE 50; 325; 40 MG/1; MG/1; MG/1
1 TABLET ORAL 2 TIMES DAILY PRN
Qty: 60 TABLET | Refills: 0 | Status: SHIPPED | OUTPATIENT
Start: 2019-11-22 | End: 2019-12-30 | Stop reason: SDUPTHER

## 2019-11-22 RX ORDER — SODIUM CHLORIDE 9 MG/ML
250 INJECTION, SOLUTION INTRAVENOUS ONCE
Status: DISCONTINUED | OUTPATIENT
Start: 2019-11-22 | End: 2019-11-23 | Stop reason: HOSPADM

## 2019-11-22 RX ADMIN — SODIUM CHLORIDE 200 MG: 900 INJECTION, SOLUTION INTRAVENOUS at 13:30

## 2019-11-22 NOTE — PROGRESS NOTES
Nutrition Assessment  Ann Andre    The pt requested supplements. I provided her with two 6-pack cases of chocolate Boost Plus along with a booklet of Boost coupons.    Beatriz Arreola MS,RD,LD-[place],CD-[place]  Registered Dietitian

## 2019-11-25 ENCOUNTER — APPOINTMENT (OUTPATIENT)
Dept: ONCOLOGY | Facility: HOSPITAL | Age: 79
End: 2019-11-25

## 2019-12-03 ENCOUNTER — TELEPHONE (OUTPATIENT)
Dept: ONCOLOGY | Facility: CLINIC | Age: 79
End: 2019-12-03

## 2019-12-03 NOTE — TELEPHONE ENCOUNTER
Ms. Eleni Thai left message regarding rescheduling Ms. Andre' 12/10 appt to 12/17.  Returned call, left message to call back regarding rescheduling.

## 2019-12-06 ENCOUNTER — HOSPITAL ENCOUNTER (OUTPATIENT)
Dept: ONCOLOGY | Facility: HOSPITAL | Age: 79
Setting detail: INFUSION SERIES
Discharge: HOME | End: 2019-12-06

## 2019-12-06 VITALS
SYSTOLIC BLOOD PRESSURE: 177 MMHG | DIASTOLIC BLOOD PRESSURE: 72 MMHG | HEIGHT: 63 IN | HEART RATE: 75 BPM | WEIGHT: 102 LBS | BODY MASS INDEX: 18.07 KG/M2 | RESPIRATION RATE: 18 BRPM | TEMPERATURE: 98 F

## 2019-12-06 DIAGNOSIS — C79.51 METASTASIS TO BONE (HCC): Primary | ICD-10-CM

## 2019-12-06 DIAGNOSIS — C34.32 PRIMARY CANCER OF LEFT LOWER LOBE OF LUNG (HCC): ICD-10-CM

## 2019-12-06 PROCEDURE — 25010000002 DENOSUMAB 120 MG/1.7ML SOLUTION: Performed by: INTERNAL MEDICINE

## 2019-12-06 PROCEDURE — 96372 THER/PROPH/DIAG INJ SC/IM: CPT | Performed by: INTERNAL MEDICINE

## 2019-12-06 RX ORDER — CYCLOBENZAPRINE HCL 10 MG
TABLET ORAL
Qty: 60 TABLET | Refills: 1 | Status: SHIPPED | OUTPATIENT
Start: 2019-12-06 | End: 2020-01-14

## 2019-12-06 RX ADMIN — DENOSUMAB 120 MG: 120 INJECTION SUBCUTANEOUS at 14:08

## 2019-12-06 NOTE — PROGRESS NOTES
Patient to office today for xgeva injection. Patient has no complaints specifically jaw pain or issues. Patient verifies she is taking her calcium.

## 2019-12-11 DIAGNOSIS — Z79.899 ENCOUNTER FOR LONG-TERM (CURRENT) USE OF OTHER MEDICATIONS: ICD-10-CM

## 2019-12-11 DIAGNOSIS — C34.32 PRIMARY CANCER OF LEFT LOWER LOBE OF LUNG (HCC): ICD-10-CM

## 2019-12-11 RX ORDER — SODIUM CHLORIDE 9 MG/ML
250 INJECTION, SOLUTION INTRAVENOUS ONCE
Status: CANCELLED | OUTPATIENT
Start: 2019-12-13

## 2019-12-12 NOTE — PROGRESS NOTES
HEMATOLOGY ONCOLOGY FOLLOW UP        Patient name: Ann Andre  : 1940  MRN: [id number]  Primary Care Physician: Rochelle Fletcher MD  Referring Physician: Rochelle Fletcher, *  Reason For Consult:     Chief Complaint   Patient presents with   • Follow-up     CLL   Metastatic lung cancer  Chronic lymphocytic leukemia      History of Present Illness:  Ms. Andre is a pleasant 78-year-old female who underwent lab work at her Primary Care Physician’s office on 10/27/14 that showed leukocytosis with a white count of 12.1 and normal hemoglobin and platelet count.  Her absolute lymphocyte count is 8,000 (elevated).  ESR was normal at 6.  The patient was therefore referred for hematology for further evaluation of the lymphocytosis.  She also has chronic kidney disease and her creatinine was 1.8 on 10/29/14.      14 - On initial evaluation in our office, WBC 12.2, hemoglobin 10.8, platelet count 311,000, lymphocyte count is almost 6,000.  The patient denied any fever, chills, night sweats, weight loss or lymph node swelling.  She does report having chronic anemia and also had received B12 injections in the past.  She reports history of mild chronic kidney disease.    • 14 - Creatinine 2.3, BUN 27, iron saturation 22%, TIBC 286, serum iron 64, ferritin 232, B12 566, reticulocyte count 1.3%.   • 14 - Flow cytometry:  Immunophenotyping identifies CD5 positive, CD23 positive, Monoclonal copper B cell population representing 24% of the total events.  CD 38 was negative.  This represents CLL.   • 1/22/15 - Bone marrow biopsy:  Variable cellular bone marrow with involvement by SLL/CLL in a nodular pattern.  Flow cytometry involvement 24% by CLL/SLL.  Nodular pattern of spread is considered a good prognostic finding.   Examination with PAS stain revealed several interstitial nodules which occupy 5% to 10% of the total marrow cellularity.  Cytogenetics showed normal karyotype.   CLL FISH panel:  Negative.  Specific probes for TP53 on chromosome 17, NABIL on chromosome 11, chromosome 12 and chromosome 13 were negative.  This indicates good prognosis.    • 2/12/15 - WBC 11.1, hemoglobin 10.9, platelet count 360,000, MCV 98.6.   • 7/14/15 - WBC 9.2, hemoglobin 10.1, platelet count 306,000, MCV 94.1.  • 4/21/15 - Iron saturation 21%, TIBC 264,000, serum iron 56, ferritin 199, EPO level 6, reticulocyte count 1.6%, haptoglobin 124.    • 10/20/15 - WBC 10.7, hemoglobin 11.2, platelet count 348,000.  • 2/16/16 - Iron saturation 25%, TIBC 250, serum iron 63.  WBC 7.7, hemoglobin 10.9, platelet count 293,000, MCV 91.3, lymphocytes 3,900.  • 3/14/16 - Creatinine 1.48.  • 8/30/16 - Lymphocyte count 4,370.  Iron saturation 26%, TIBC 262, serum iron 67.    • 9/6/16 - WBC 11.5, hemoglobin 11.6, platelet count 334,000, MCV 93.6.  • 1/2/17 - WBC 7.2, hemoglobin 10.6, platelet count 310,000, MCV 97.2.    • 5/22/17 - WBC 8.9, hemoglobin 10.7, platelet count 294,000, MCV 99.7.  Absolute lymphocyte count 3.66 (H).  Iron 50, TIBC 205, iron saturation 20%.    • 3/27/18 - WBC 10.1, hemoglobin 11, MCV 96.7, platelet count 301,000.  Serum iron 80, iron saturation 31%, TIBC 256.  Creatinine 1.5, BUN 27.    • 9/20/18 - WBC 9, hemoglobin 10, platelet count 284,000, .5.  Absolute lymphocyte count 4250 (H).  Ferritin 351.  Iron 51, TIBC 197.  • 12/28/18 - Creatinine 1.7, BUN 31.  Calcium 10.7 (H), albumin 4.1.  WBC 8.7, hemoglobin 10.3, platelet count 375,000, .6.    • 1/8/19 - TSH 1.74.  Ferritin 379.  Iron saturation 14%, TIBC 261, serum iron 37 (L).  SPEP appears normal.  Serum immunofixation:  No monoclonal band.  PTH intact 63 (normal range 14-64).  Serum calcium 10.3 (N).    • 3/12/19 - WBC 7.6, hemoglobin 11.5, platelet count 312,000, .9.  Creatinine 1.38, BUN 22, calcium 10.9 (H).  Vitamin D level 49 (N).  • 7/22/2019 CT scan chest without contrast: There is a new mass, multilobulated within  the left lower lobe, measures 3.1 x 2.3 cm.  Adjacent scarring is present as well.  Evidence of prior granulomatous disease, no obvious mediastinal or hilar lymphadenopathy..  Aneurysm of the ascending thoracic aorta.  CT scan was done in the context of left rib cage pain after having a fall.  • 7/18/2019 MRI brain: Possible enhancing lesion seen only on coronal image 8 measures 4.4 mm within the right precentral gyrus.  Per the radiologist, this could be an old infarct or metastases or pulsation artifact.  No acute infarct.  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  FDG avid left hilar lymphadenopathy with SUV of 8.2..  Multiple FDG avid hepatic and osseous lesions compatible with the metastasis.  There are approximately 15-20 FDG avid osseous lesions compatible with mets.  Index lesion involving the left posterior eighth rib demonstrates lytic changes with SUV of 12.9.  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 The biopsy is stained via immunohistochemical technique utilizing appropriate controls for the presence of CK7, CK5/6, p63, TTF-1, and napsin A. The tumor cells are strongly positive for all markers tested and indicate both a squamous and an adenocarcinoma component.  • 9/23/2019 WBC 9.3, hemoglobin 10, platelets 306, .3  • 9/24/2019 brain MRI with and without contrast: No evidence of any brain metastasis.  • October 7, 2019 Next generation sequencing testing by [facility]:= PDL 1 by IHC 60%.  Testing is negative for Sylvester BRAF, ROS 1, MET, and NTRK, K-walt, ERBB 2, RET,  STK11,TP53.. 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  • 10/30/2019 patient received cycle 1 of Keytruda.  TSH 4.44 high, free T4 1.05 patient received Xgeva  • 10/30/2019 WBC 6.7, hemoglobin 10.0, platelets 360, creatinine 1.33,   • 11/22/2019 patient received Keytruda 200 mg, creatinine 1.27, BUN 31,  • 12/6/2019 patient received Xgeva  • 12/13/2019 patient received Keytruda 200 mg cycle 3.  WBC 6.6, hemoglobin 10.6, platelets 284, creatinine 1.1, AST 65, ALT 61       Subjective:12/13/19  Patient is here for a follow up appointment.    Patient states she is doing extremely well.  She denies any diarrhea, denies any cough, shortness of air, or any signs of pneumonitis.,  Denies any rash.  She is tolerating immunotherapy well.  Fatigue has improved.  She has good appetite.  She is happy that she does not have any adverse events related to the treatment..  Also does not have any B symptoms related to CLL.        Past Medical History:   Diagnosis Date   • Anemia of renal disease 6/27/2019   • Arthritis    • Cancer (CMS/HCC)    • Chronic back pain    • Chronic kidney disease (CKD)    • Depression    • Elevated cholesterol    • Gastroesophageal reflux disease 9/24/2019   • Hypertension    • Migraine    • Polypharmacy 6/27/2019   • Protein-calorie malnutrition, moderate (CMS/HCC) 6/27/2019   • Restless leg syndrome    • Stroke (CMS/HCC)        Past Surgical History:   Procedure Laterality Date   • APPENDECTOMY     • CHOLECYSTECTOMY     • HYSTERECTOMY     • JOINT REPLACEMENT     • OOPHORECTOMY     • SHOULDER SURGERY Left          Current Outpatient Medications:   •  Biotin 5000 MCG capsule, Take  by mouth Daily., Disp: , Rfl:   •  butalbital-acetaminophen-caffeine (ESGIC) -40 MG per tablet, Take 1 tablet by mouth 2 (Two) Times a Day As Needed for Headache., Disp: 60 tablet, Rfl: 0  •  calcitriol  (ROCALTROL) 0.25 MCG capsule, Take 1 capsule by mouth Daily., Disp: 30 capsule, Rfl: 5  •  celecoxib (CELEBREX) 200 MG capsule, Celebrex 200 mg capsule, Disp: , Rfl:   •  Cranberry-Cholecalciferol 4200-500 MG-UNIT capsule, Take  by mouth Daily., Disp: , Rfl:   •  cyclobenzaprine (FLEXERIL) 10 MG tablet, TAKE ONE TABLET BY MOUTH TWICE DAILY, Disp: 60 tablet, Rfl: 1  •  diphenhydrAMINE (BENADRYL) 12.5 MG/5ML elixir, Take  by mouth 4 (Four) Times a Day As Needed for Itching., Disp: , Rfl:   •  diphenoxylate-atropine (LOMOTIL) 2.5-0.025 MG per tablet, diphenoxylate-atropine 2.5 mg-0.025 mg tablet  take 2 tablets by mouth immediately then 1 tablet by mouth AFTER ...  (REFER TO PRESCRIPTION NOTES)., Disp: , Rfl:   •  docusate sodium (COLACE) 100 MG capsule, Take 100 mg by mouth Daily., Disp: , Rfl:   •  DULoxetine (CYMBALTA) 60 MG capsule, Take 60 mg by mouth Daily., Disp: , Rfl:   •  EPINEPHrine (EPIPEN) 0.3 MG/0.3ML solution auto-injector injection, Daily., Disp: , Rfl:   •  esomeprazole (nexIUM) 40 MG capsule, Nexium 40 mg capsule,delayed release, Disp: , Rfl:   •  fentaNYL (DURAGESIC) 50 MCG/HR patch, Place 1 patch on the skin as directed by provider Every 72 (Seventy-Two) Hours., Disp: 10 patch, Rfl: 0  •  ferrous sulfate 325 (65 FE) MG tablet, Take 1 tablet by mouth Daily With Breakfast., Disp: 30 tablet, Rfl: 5  •  fesoterodine fumarate (TOVIAZ) 8 MG tablet sustained-release 24 hour tablet, Toviaz 8 mg tablet,extended release, Disp: , Rfl:   •  fexofenadine (ALLEGRA) 180 MG tablet, Take 180 mg by mouth Daily., Disp: , Rfl:   •  fluconazole (DIFLUCAN) 200 MG tablet, fluconazole 200 mg tablet, Disp: , Rfl:   •  fluticasone (FLONASE) 50 MCG/ACT nasal spray, INHALE TWO SPRAYS EACH NOSTRIL EVERY DAY, Disp: , Rfl: 1  •  furosemide (LASIX) 20 MG tablet, Take 20 mg by mouth Daily., Disp: , Rfl: 3  •  hydrALAZINE (APRESOLINE) 25 MG tablet, Take 1 tablet by mouth 3 (Three) Times a Day., Disp: 90 tablet, Rfl: 5  •   HYDROcodone-acetaminophen (NORCO) 7.5-325 MG per tablet, hydrocodone 7.5 mg-acetaminophen 325 mg tablet, Disp: , Rfl:   •  levothyroxine (SYNTHROID, LEVOTHROID) 50 MCG tablet, Take 1 tablet by mouth Daily., Disp: 30 tablet, Rfl: 1  •  lidocaine (XYLOCAINE,URO-JET) 2 % gel, apply TO mucosal membrane THREE TIMES DAILY AS NEEDED AS directed, Disp: , Rfl: 0  •  lisinopril (PRINIVIL,ZESTRIL) 20 MG tablet, lisinopril 20 mg tablet, Disp: , Rfl:   •  LORazepam (ATIVAN) 2 MG tablet, lorazepam 2 mg tablet, Disp: , Rfl:   •  lubiprostone (AMITIZA) 24 MCG capsule, Amitiza 24 mcg capsule, Disp: , Rfl:   •  meclizine 25 MG chewable tablet chewable tablet, Chew 25 mg 2 (Two) Times a Day As Needed., Disp: , Rfl:   •  melatonin 5 MG tablet tablet, Take 5 mg by mouth At Night As Needed., Disp: , Rfl:   •  metoclopramide (REGLAN) 10 MG tablet, metoclopramide 10 mg tablet, Disp: , Rfl:   •  Milnacipran HCl (SAVELLA) 100 MG tablet, Savella 100 mg tablet, Disp: , Rfl:   •  montelukast (SINGULAIR) 10 MG tablet, montelukast 10 mg tablet, Disp: , Rfl:   •  Naloxegol Oxalate (MOVANTIK) 25 MG tablet, Take 25 mg by mouth Every Morning., Disp: , Rfl:   •  NAMZARIC 28-10 MG capsule sustained-release 24 hr, Take 1 capsule by mouth Daily., Disp: , Rfl: 3  •  nitrofurantoin, macrocrystal-monohydrate, (MACROBID) 100 MG capsule, Take 1 capsule by mouth 2 (Two) Times a Day., Disp: 60 capsule, Rfl: 5  •  omeprazole (priLOSEC) 40 MG capsule, Take 40 mg by mouth 2 (Two) Times a Day., Disp: , Rfl:   •  oxybutynin (OXYTROL) 3.9 MG/24HR, Oxytrol 3.9 mg/24 hr transdermal patch, Disp: , Rfl:   •  oxybutynin XL (DITROPAN-XL) 10 MG 24 hr tablet, Take 1 tablet by mouth Daily., Disp: 30 tablet, Rfl: 5  •  polyethylene glycol (MIRALAX) packet, Take 17 g by mouth As Needed., Disp: , Rfl:   •  potassium chloride (K-DUR) 10 MEQ CR tablet, Take 10 mEq by mouth 2 (Two) Times a Day., Disp: , Rfl: 2  •  predniSONE (DELTASONE) 5 MG tablet, Take 5 mg by mouth Daily., Disp:  , Rfl:   •  RESTASIS 0.05 % ophthalmic emulsion, 2 (Two) Times a Day., Disp: , Rfl: 6  •  rOPINIRole (REQUIP) 1 MG tablet, Take 1 tablet by mouth Every Night. Take 1 hour before bedtime., Disp: 30 tablet, Rfl: 5  •  sertraline (ZOLOFT) 100 MG tablet, Take 100 mg by mouth Daily., Disp: , Rfl:   •  SUMAtriptan (IMITREX) 25 MG tablet, Take 1 tablet by mouth As Needed for Migraine. 1 tab po at onset of HA. If no relief in two hours, may repeat x 1., Disp: 30 tablet, Rfl: 1  •  tiZANidine (ZANAFLEX) 4 MG tablet, tizanidine 4 mg tablet, Disp: , Rfl:   •  tolterodine LA (DETROL LA) 4 MG 24 hr capsule, tolterodine ER 4 mg capsule,extended release 24 hr  TAKE 1 CAPSULE BY MOUTH ONCE DAILY  generic Detral LA, Disp: , Rfl:   •  traMADol (ULTRAM) 50 MG tablet, tramadol 50 mg tablet  take 2 tablets by mouth three times a day, Disp: , Rfl:   •  vilazodone (VIIBRYD) 10 MG tablet tablet, Viibryd 10 mg tablet  take 1/2 tablet by mouth AT NOON daily, Disp: , Rfl:   No current facility-administered medications for this visit.     Allergies   Allergen Reactions   • Inderal  [Propranolol] Unknown (See Comments)   • L-Methylfolate-Algae-B12-B6 Unknown (See Comments)   • Morphine Nausea And Vomiting   • Rofecoxib Other (See Comments)   • Topiramate Other (See Comments)       Family History   Problem Relation Age of Onset   • Breast cancer Maternal Aunt    • Leukemia Maternal Aunt        Cancer-related family history includes Breast cancer in her maternal aunt.    Social History     Tobacco Use   • Smoking status: Former Smoker     Packs/day: 1.50     Years: 30.00     Pack years: 45.00     Types: Cigarettes     Last attempt to quit: 2014     Years since quittin.4   • Smokeless tobacco: Never Used   Substance Use Topics   • Alcohol use: No     Frequency: Never   • Drug use: No         I have reviewed the history of present illness, past medical history, family history, social history, lab results, all notes and other records since  the patient was last seen on  Visit date not found.    ROS:     Review of Systems   Constitutional: Positive for fatigue and unexpected weight change (have lost #20 pounds in the past year). Negative for appetite change, chills and fever.   HENT: Negative for ear pain, mouth sores, nosebleeds and sore throat.    Eyes: Negative for photophobia and visual disturbance.   Respiratory: Negative for shortness of breath, wheezing and stridor.    Cardiovascular: Negative for chest pain and palpitations.   Gastrointestinal: Negative for abdominal pain, diarrhea, nausea and vomiting.   Endocrine: Negative for cold intolerance and heat intolerance.   Genitourinary: Negative for dysuria and hematuria.   Musculoskeletal: Positive for myalgias. Negative for joint swelling and neck stiffness.        Bilateral rib pain   Skin: Negative for color change and rash.   Neurological: Positive for headaches. Negative for seizures and syncope.   Hematological: Negative for adenopathy.        No obvious bleeding   Psychiatric/Behavioral: Negative for agitation, confusion, hallucinations and sleep disturbance.       Objective:    There were no vitals filed for this visit.  ECOG  (1) Restricted in physically strenuous activity, ambulatory and able to do work of light nature    Physical Exam:   Physical Exam   Constitutional: She is oriented to person, place, and time. She appears well-developed and well-nourished. No distress.   Somewhat thin appearing elderly female   HENT:   Head: Normocephalic and atraumatic.   Eyes: Conjunctivae and EOM are normal. Right eye exhibits no discharge. Left eye exhibits no discharge. No scleral icterus.   Neck: Normal range of motion. Neck supple. No thyromegaly present.   Cardiovascular: Normal rate, regular rhythm and normal heart sounds. Exam reveals no gallop and no friction rub.   Pulmonary/Chest: Effort normal. No respiratory distress. She has no wheezes.   Decreased air entry bilaterally in the lungs.    Abdominal: Soft. Bowel sounds are normal. She exhibits no mass. There is no tenderness. There is no rebound and no guarding.   Musculoskeletal: Normal range of motion. She exhibits no tenderness.   Lymphadenopathy:     She has no cervical adenopathy.   Neurological: She is alert and oriented to person, place, and time. She exhibits normal muscle tone.   Skin: Skin is warm. No rash noted. She is not diaphoretic. No erythema.   Psychiatric: She has a normal mood and affect. Her behavior is normal.   Nursing note and vitals reviewed.            Lab Results - Last 18 Months   Lab Units 12/13/19  1209 11/22/19  1237 10/30/19  1149   WBC 10*3/mm3 6.69 7.35 6.77   HEMOGLOBIN g/dL 10.6* 10.4* 10.0*   HEMATOCRIT % 33.8* 32.1* 31.5*   PLATELETS 10*3/mm3 284 381 360   MCV fL 98.3* 98.2* 99.7*     Lab Results - Last 18 Months   Lab Units 12/13/19  1209 11/22/19  1237 10/30/19  1149   SODIUM mmol/L 136 134* 137   POTASSIUM mmol/L 4.6 5.0 5.0   CHLORIDE mmol/L 100 99 101   CO2 mmol/L 22.0 24.0 23.0   BUN mg/dL 23 31* 26*   CREATININE mg/dL 1.10* 1.27* 1.33*   CALCIUM mg/dL 9.1 9.6 10.0   BILIRUBIN mg/dL 0.3 <0.2* 0.2   ALK PHOS U/L 103 119* 109   ALT (SGPT) U/L 61* 33 11   AST (SGOT) U/L 65* 41* 24   GLUCOSE mg/dL 148* 112* 97       Lab Results   Component Value Date    GLUCOSE 148 (H) 12/13/2019    BUN 23 12/13/2019    CREATININE 1.10 (H) 12/13/2019    EGFRIFNONA 48 (L) 12/13/2019    BCR 20.9 12/13/2019    K 4.6 12/13/2019    CO2 22.0 12/13/2019    CALCIUM 9.1 12/13/2019    ALBUMIN 4.10 12/13/2019    AST 65 (H) 12/13/2019    ALT 61 (H) 12/13/2019       Lab Results - Last 18 Months   Lab Units 09/12/19  0755   INR  0.96   APTT seconds 20.5*       Lab Results   Component Value Date    IRON 45 08/30/2019    TIBC 277 08/30/2019    FERRITIN 295.00 08/30/2019       Lab Results   Component Value Date    FOLATE 9.60 08/30/2019       No results found for: OCCULTBLD    No results found for: RETICCTPCT    Lab Results   Component Value  Date    [id number] 889 08/30/2019     No results found for: SPEP, UPEP  No results found for: LDH, URICACID  No results found for: JOSAFAT, RF, SEDRATE  No results found for: FIBRINOGEN, HAPTOGLOBIN  Lab Results   Component Value Date    PTT 20.5 (L) 09/12/2019    INR 0.96 09/12/2019     No results found for:   No results found for: CEA  No components found for: CA-19-9  No results found for: PSA          Assessment/Plan     Leukocytosis, unspecified type       Assessment;  1. Metastatic lung cancer: Stage IV .she presented with a left lower lobe lung mass.  PET scan shows liver metastases and bone metastases.  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  Adenosquamous carcinoma tend to be more aggressive than AdenoCa or squamous carcinomas.  Next generation sequencing testing was performed and it failed to show any targetable mutations.  [facility] shows PDL 1 score of 60%.  Patient has been started on Keytruda and has received 3 cycles so far.  2. Lung cancer PDL 1 score 60%  3. Chronic lymphocytic leukemia:  The patient does not have any B symptoms or lymph node swelling.  She is EMERSON Stage 0.  Her bone marrow biopsy previously showed nodular involvement by CLL with nodular deposits occupying 5-10% of marrow cellularity.  FISH panel was negative.  Cytogenetics showed normal karyotype.  Overall, patient did not have any poor prognostic features.  Recent CBC shows a stable white count.  Platelet count is also normal and physical exam does not reveal any lymphadenopathy and she does not have any B symptoms.    4. Enhancing lesion MRI brain: Only 4.4 mm.  It does not appear suspicious for metastasis.  5. History of hypothyroidism: Was not on any medications.  Re-started levothyroxine on 11/12/2019  6. Anemia of chronic kidney disease:  Her hemoglobin remains above 10.  She also had iron deficiency and is on iron supplements.   Iron stores are adequate.  She has been taking two iron tablets daily.    7. History  of hypercalcemia: PET scan now shows bone metastasis.  PTH is in the high normal range.  Patient has CKD.  .  Vitamin D level is normal.  Will start patient on Xgeva.    8. History of mild iron deficiency.  She is on iron supplement.    9. Hx of Skin lesions:  She is seeing a dermatologist.  She had a skin biopsy.  She also reports having skin cancer excised on her right lower extremity with Dr. Buckley.    10. History of dizziness:  She had a thorough work up.  She saw cardiology.  She also saw an ophthalmologist.  She had a brain MRI which was negative.  11. History of headaches:  She has history of migraines.      PLAN:      1. Continue Immunotherapy.  Patient is tolerating Keytruda well..  2. Continue Xgeva for bone metastases.  3. 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  Patient started on Synthroid.  Recheck TSH T3 and T4.  4. Regarding hypercalcemia, advised patient told hold off her vitamin D and calcium supplements.  Calcium has normalized..                Follow-up in 4  weeks                    Thank you very much for providing the opportunity to participate in this patient’s care. Please do not hesitate to call if there are any other questions.    I have reviewed all relevant labs results, imaging,Outside reports,notes, vitals, medications and plan with the patient today.    Portions of the note have been Scribed by medical assistant/Nurse.  I have reviewed and made changes accordingly.

## 2019-12-13 ENCOUNTER — HOSPITAL ENCOUNTER (OUTPATIENT)
Dept: ONCOLOGY | Facility: HOSPITAL | Age: 79
Setting detail: INFUSION SERIES
Discharge: HOME | End: 2019-12-13

## 2019-12-13 ENCOUNTER — OFFICE VISIT (OUTPATIENT)
Dept: ONCOLOGY | Facility: CLINIC | Age: 79
End: 2019-12-13

## 2019-12-13 VITALS
HEART RATE: 74 BPM | RESPIRATION RATE: 18 BRPM | WEIGHT: 103 LBS | TEMPERATURE: 98.1 F | HEIGHT: 63 IN | SYSTOLIC BLOOD PRESSURE: 175 MMHG | DIASTOLIC BLOOD PRESSURE: 54 MMHG | BODY MASS INDEX: 18.25 KG/M2

## 2019-12-13 DIAGNOSIS — D63.1 ANEMIA OF RENAL DISEASE: Chronic | ICD-10-CM

## 2019-12-13 DIAGNOSIS — C34.32 PRIMARY CANCER OF LEFT LOWER LOBE OF LUNG (HCC): ICD-10-CM

## 2019-12-13 DIAGNOSIS — C91.10 CLL (CHRONIC LYMPHOCYTIC LEUKEMIA) (HCC): ICD-10-CM

## 2019-12-13 DIAGNOSIS — D72.829 LEUKOCYTOSIS, UNSPECIFIED TYPE: Primary | ICD-10-CM

## 2019-12-13 DIAGNOSIS — Z79.899 ENCOUNTER FOR LONG-TERM (CURRENT) USE OF OTHER MEDICATIONS: Primary | ICD-10-CM

## 2019-12-13 DIAGNOSIS — N18.9 ANEMIA OF RENAL DISEASE: Chronic | ICD-10-CM

## 2019-12-13 DIAGNOSIS — C79.51 METASTASIS TO BONE (HCC): ICD-10-CM

## 2019-12-13 LAB
ALBUMIN SERPL-MCNC: 4.1 G/DL (ref 3.5–5.2)
ALBUMIN/GLOB SERPL: 1.3 G/DL
ALP SERPL-CCNC: 103 U/L (ref 39–117)
ALT SERPL W P-5'-P-CCNC: 61 U/L (ref 1–33)
ANION GAP SERPL CALCULATED.3IONS-SCNC: 14 MMOL/L (ref 5–15)
AST SERPL-CCNC: 65 U/L (ref 1–32)
BASOPHILS # BLD AUTO: 0.07 10*3/MM3 (ref 0–0.2)
BASOPHILS NFR BLD AUTO: 1 % (ref 0–1.5)
BILIRUB SERPL-MCNC: 0.3 MG/DL (ref 0.2–1.2)
BUN BLD-MCNC: 23 MG/DL (ref 8–23)
BUN/CREAT SERPL: 20.9 (ref 7–25)
CALCIUM SPEC-SCNC: 9.1 MG/DL (ref 8.6–10.5)
CHLORIDE SERPL-SCNC: 100 MMOL/L (ref 98–107)
CO2 SERPL-SCNC: 22 MMOL/L (ref 22–29)
CREAT BLD-MCNC: 1.1 MG/DL (ref 0.57–1)
DEPRECATED RDW RBC AUTO: 45.4 FL (ref 37–54)
EOSINOPHIL # BLD AUTO: 0.21 10*3/MM3 (ref 0–0.4)
EOSINOPHIL NFR BLD AUTO: 3.1 % (ref 0.3–6.2)
ERYTHROCYTE [DISTWIDTH] IN BLOOD BY AUTOMATED COUNT: 13.2 % (ref 12.3–15.4)
GFR SERPL CREATININE-BSD FRML MDRD: 48 ML/MIN/1.73
GLOBULIN UR ELPH-MCNC: 3.2 GM/DL
GLUCOSE BLD-MCNC: 148 MG/DL (ref 65–99)
HCT VFR BLD AUTO: 33.8 % (ref 34–46.6)
HGB BLD-MCNC: 10.6 G/DL (ref 12–15.9)
LYMPHOCYTES # BLD AUTO: 1.04 10*3/MM3 (ref 0.7–3.1)
LYMPHOCYTES NFR BLD AUTO: 15.5 % (ref 19.6–45.3)
MCH RBC QN AUTO: 30.8 PG (ref 26.6–33)
MCHC RBC AUTO-ENTMCNC: 31.4 G/DL (ref 31.5–35.7)
MCV RBC AUTO: 98.3 FL (ref 79–97)
MONOCYTES # BLD AUTO: 0.45 10*3/MM3 (ref 0.1–0.9)
MONOCYTES NFR BLD AUTO: 6.7 % (ref 5–12)
NEUTROPHILS # BLD AUTO: 4.92 10*3/MM3 (ref 1.7–7)
NEUTROPHILS NFR BLD AUTO: 73.7 % (ref 42.7–76)
PLATELET # BLD AUTO: 284 10*3/MM3 (ref 140–450)
PMV BLD AUTO: 8.4 FL (ref 6–12)
POTASSIUM BLD-SCNC: 4.6 MMOL/L (ref 3.5–5.2)
PROT SERPL-MCNC: 7.3 G/DL (ref 6–8.5)
RBC # BLD AUTO: 3.44 10*6/MM3 (ref 3.77–5.28)
SODIUM BLD-SCNC: 136 MMOL/L (ref 136–145)
T4 FREE SERPL-MCNC: 1.08 NG/DL (ref 0.93–1.7)
TSH SERPL DL<=0.05 MIU/L-ACNC: 1.82 UIU/ML (ref 0.27–4.2)
WBC NRBC COR # BLD: 6.69 10*3/MM3 (ref 3.4–10.8)

## 2019-12-13 PROCEDURE — 84443 ASSAY THYROID STIM HORMONE: CPT | Performed by: NURSE PRACTITIONER

## 2019-12-13 PROCEDURE — 80053 COMPREHEN METABOLIC PANEL: CPT | Performed by: NURSE PRACTITIONER

## 2019-12-13 PROCEDURE — 36415 COLL VENOUS BLD VENIPUNCTURE: CPT | Performed by: INTERNAL MEDICINE

## 2019-12-13 PROCEDURE — 85025 COMPLETE CBC W/AUTO DIFF WBC: CPT | Performed by: NURSE PRACTITIONER

## 2019-12-13 PROCEDURE — 96413 CHEMO IV INFUSION 1 HR: CPT | Performed by: INTERNAL MEDICINE

## 2019-12-13 PROCEDURE — 99215 OFFICE O/P EST HI 40 MIN: CPT | Performed by: INTERNAL MEDICINE

## 2019-12-13 PROCEDURE — 84439 ASSAY OF FREE THYROXINE: CPT | Performed by: NURSE PRACTITIONER

## 2019-12-13 PROCEDURE — 25010000002 PEMBROLIZUMAB 100 MG/4ML SOLUTION 4 ML VIAL: Performed by: NURSE PRACTITIONER

## 2019-12-13 RX ORDER — TOLTERODINE 4 MG/1
CAPSULE, EXTENDED RELEASE ORAL
COMMUNITY
End: 2020-01-14

## 2019-12-13 RX ORDER — TRAMADOL HYDROCHLORIDE 50 MG/1
TABLET ORAL
COMMUNITY
End: 2019-12-17

## 2019-12-13 RX ORDER — DIPHENOXYLATE HYDROCHLORIDE AND ATROPINE SULFATE 2.5; .025 MG/1; MG/1
TABLET ORAL
COMMUNITY

## 2019-12-13 RX ORDER — HYDROCODONE BITARTRATE AND ACETAMINOPHEN 7.5; 325 MG/1; MG/1
TABLET ORAL
COMMUNITY
End: 2019-12-17

## 2019-12-13 RX ORDER — LISINOPRIL 20 MG/1
TABLET ORAL
COMMUNITY
End: 2020-01-14

## 2019-12-13 RX ORDER — FESOTERODINE FUMARATE 8 MG/1
TABLET, EXTENDED RELEASE ORAL
COMMUNITY
End: 2020-01-14

## 2019-12-13 RX ORDER — TIZANIDINE 4 MG/1
TABLET ORAL
COMMUNITY
End: 2020-01-14 | Stop reason: SDUPTHER

## 2019-12-13 RX ORDER — LUBIPROSTONE 24 UG/1
CAPSULE ORAL
COMMUNITY
End: 2020-01-14

## 2019-12-13 RX ORDER — CELECOXIB 200 MG/1
CAPSULE ORAL
COMMUNITY
End: 2020-01-14

## 2019-12-13 RX ORDER — VILAZODONE HYDROCHLORIDE 10 MG/1
TABLET ORAL
COMMUNITY
End: 2019-12-17

## 2019-12-13 RX ORDER — LORAZEPAM 2 MG/1
TABLET ORAL
COMMUNITY
End: 2020-01-14

## 2019-12-13 RX ORDER — FLUCONAZOLE 200 MG/1
TABLET ORAL
COMMUNITY
End: 2020-01-13

## 2019-12-13 RX ORDER — ESOMEPRAZOLE MAGNESIUM 40 MG/1
CAPSULE, DELAYED RELEASE ORAL
COMMUNITY
End: 2020-01-14

## 2019-12-13 RX ORDER — MONTELUKAST SODIUM 10 MG/1
TABLET ORAL
COMMUNITY
End: 2020-01-14

## 2019-12-13 RX ORDER — METOCLOPRAMIDE 10 MG/1
TABLET ORAL
COMMUNITY
End: 2020-01-14

## 2019-12-13 RX ADMIN — SODIUM CHLORIDE 200 MG: 900 INJECTION, SOLUTION INTRAVENOUS at 13:36

## 2019-12-17 ENCOUNTER — OFFICE VISIT (OUTPATIENT)
Dept: FAMILY MEDICINE CLINIC | Facility: CLINIC | Age: 79
End: 2019-12-17

## 2019-12-17 ENCOUNTER — TELEPHONE (OUTPATIENT)
Dept: ONCOLOGY | Facility: CLINIC | Age: 79
End: 2019-12-17

## 2019-12-17 VITALS
WEIGHT: 106 LBS | HEIGHT: 63 IN | TEMPERATURE: 98.7 F | HEART RATE: 75 BPM | DIASTOLIC BLOOD PRESSURE: 60 MMHG | RESPIRATION RATE: 16 BRPM | SYSTOLIC BLOOD PRESSURE: 142 MMHG | BODY MASS INDEX: 18.78 KG/M2 | OXYGEN SATURATION: 99 %

## 2019-12-17 DIAGNOSIS — M51.36 DEGENERATION OF LUMBAR INTERVERTEBRAL DISC: ICD-10-CM

## 2019-12-17 DIAGNOSIS — C79.51 METASTASIS TO BONE (HCC): ICD-10-CM

## 2019-12-17 DIAGNOSIS — C78.7 LIVER METASTASIS: ICD-10-CM

## 2019-12-17 DIAGNOSIS — C34.32 PRIMARY CANCER OF LEFT LOWER LOBE OF LUNG (HCC): Primary | ICD-10-CM

## 2019-12-17 DIAGNOSIS — Z79.899 ENCOUNTER FOR LONG-TERM (CURRENT) USE OF OTHER MEDICATIONS: ICD-10-CM

## 2019-12-17 DIAGNOSIS — Z51.81 ENCOUNTER FOR MEDICATION MONITORING: ICD-10-CM

## 2019-12-17 LAB
POC AMPHETAMINES: NEGATIVE
POC BARBITURATES: POSITIVE
POC BENZODIAZEPHINES: NEGATIVE
POC COCAINE: NEGATIVE
POC METHADONE: NEGATIVE
POC METHAMPHETAMINE SCREEN URINE: NEGATIVE
POC OPIATES: POSITIVE
POC OXYCODONE: NEGATIVE
POC PHENCYCLIDINE: NEGATIVE
POC PROPOXYPHENE: NEGATIVE
POC THC: NEGATIVE
POC TRICYCLIC ANTIDEPRESSANTS: POSITIVE

## 2019-12-17 PROCEDURE — 80305 DRUG TEST PRSMV DIR OPT OBS: CPT | Performed by: FAMILY MEDICINE

## 2019-12-17 PROCEDURE — 99214 OFFICE O/P EST MOD 30 MIN: CPT | Performed by: FAMILY MEDICINE

## 2019-12-17 RX ORDER — FLUTICASONE PROPIONATE 50 MCG
2 SPRAY, SUSPENSION (ML) NASAL DAILY
Qty: 1 BOTTLE | Refills: 5 | Status: SHIPPED | OUTPATIENT
Start: 2019-12-17 | End: 2020-03-20 | Stop reason: SDUPTHER

## 2019-12-17 RX ORDER — DULOXETIN HYDROCHLORIDE 60 MG/1
60 CAPSULE, DELAYED RELEASE ORAL DAILY
Qty: 30 CAPSULE | Refills: 5 | Status: SHIPPED | OUTPATIENT
Start: 2019-12-17 | End: 2020-01-14 | Stop reason: SDUPTHER

## 2019-12-17 NOTE — PROGRESS NOTES
Subjective   Ann Ander is a 79 y.o. female.   Chief Complaint   Patient presents with   • Pain     chronic low back pain; COMM completed as well as pain scale. INSPECT completed and is WNL.        History of Present Illness   Patient presents today for routine follow-up on chronic pain related to lung cancer with mets to bone and liver on PET scan.      She reports that her pain remains well controlled on one 50 mcg patch every 3 days.  She has discontinued all prior use of opioid tablets on the advice of her gastroenterologist at those tablets were worsening her chronic constipation.   She is content with her current level of pain control and is comfortable.  She has minimal pain with breathing.  Self-reported pain scale indicates fairly good control of chronic pain - typically pain scale runs a 2-4 out of 10. Weight is up about 3 pounds from last visit.  She is due for refill on fentanyl, Cymbalta, Flomax.  For reasons that is unclear to me, she tells me she just got 10 patches filled from her pharmacy yesterday.  Her daughter will verify that this is indeed the case.  We will await word from them before writing a prescription.      Inspect was performed and reviewed.  It is consistent with what I know.     She gets and has gotten lorazepam from Dr. Mcgregor.  This is for a very longstanding history of debilitating anxiety associated with mood instability.  She is gotten prescriptions for butalbital from Dr. Batista for headache.  She has taken both of these for a long time and there has been no significant ill effects such as oversedation, delirium, confusion.  COMM is reviewed and is reassuring.    Has home health to come and help with things 4 hours per day.      Finally, she would like a referral to a pain management center for consideration of injections in her back.  Does not want to accelerate or intensify opioid therapy, but would like to be considered for some nonpharmaceutical intervention that  could help relieve or reduce her pain.    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HCC) 09/24/2019   • Chronic constipation 09/24/2019   • Chronic pain 09/24/2019   • Chronic low back pain 09/24/2019   • Degeneration of lumbar intervertebral disc 09/24/2019   • Dementia (CMS/HCC) 09/24/2019   • Depressive disorder 09/24/2019   • Dysphagia 09/24/2019   • Fatigue 09/24/2019   • Gastroesophageal reflux disease 09/24/2019   • Gastro-esophageal reflux disease with esophagitis 09/24/2019   • Generalized osteoarthritis 09/24/2019   • Generalized headache 09/24/2019   • Hypothyroidism 09/24/2019   • Insomnia 09/24/2019   • Leukocytosis 09/24/2019   • Nausea 09/24/2019   • Malignant tumor of urinary bladder (CMS/HCC) 09/24/2019   • Neuropathy 09/24/2019   • Overactive bladder 09/24/2019   • Senile osteopenia 09/24/2019   • Stress incontinence of urine 09/24/2019   • Urethral stenosis 09/24/2019   • Urinary incontinence 09/24/2019   • Vomiting 09/24/2019   • Chronic lymphocytic leukemia (CMS/HCC) 09/24/2019   • Hypertensive disorder 09/24/2019   • Liver metastasis (CMS/HCC) 09/24/2019   • Lymphadenopathy 09/24/2019   • Metastasis to bone (CMS/HCC) 09/24/2019   • Primary cancer of left lower lobe of lung (CMS/HCC) 08/30/2019     Note Last Updated: 11/11/2019     mets to bone and liver on PET scan     • CLL (chronic lymphocytic leukemia) (CMS/HCC) 08/29/2019     Note Last Updated: 11/11/2019     Followed by Dr. Batista     • Anemia of chronic kidney failure, unspecified stage 08/29/2019   • Hypercalcemia 08/29/2019   • Iron deficiency 08/29/2019   • Chronic headaches 08/29/2019   • Dizziness 08/29/2019   • Fall 06/27/2019   • Chronic kidney  disease (CKD) 06/27/2019   • Anemia of renal disease 06/27/2019   • Hypertension 06/27/2019   • Polypharmacy 06/27/2019   • Protein-calorie malnutrition, moderate (CMS/HCC) 06/27/2019   • Syncope 02/04/2019   • Shortness of breath 06/12/2017   • Cardiac murmur 06/12/2017   • Migraine 01/27/2017           Past Surgical History:   Procedure Laterality Date   • APPENDECTOMY     • CHOLECYSTECTOMY     • HYSTERECTOMY     • JOINT REPLACEMENT     • OOPHORECTOMY     • SHOULDER SURGERY Left      Current Outpatient Medications on File Prior to Visit   Medication Sig   • Biotin 5000 MCG capsule Take  by mouth Daily.   • butalbital-acetaminophen-caffeine (ESGIC) -40 MG per tablet Take 1 tablet by mouth 2 (Two) Times a Day As Needed for Headache.   • calcitriol (ROCALTROL) 0.25 MCG capsule Take 1 capsule by mouth Daily.   • celecoxib (CELEBREX) 200 MG capsule Celebrex 200 mg capsule   • Cranberry-Cholecalciferol 4200-500 MG-UNIT capsule Take  by mouth Daily.   • cyclobenzaprine (FLEXERIL) 10 MG tablet TAKE ONE TABLET BY MOUTH TWICE DAILY   • diphenhydrAMINE (BENADRYL) 12.5 MG/5ML elixir Take  by mouth 4 (Four) Times a Day As Needed for Itching.   • diphenoxylate-atropine (LOMOTIL) 2.5-0.025 MG per tablet diphenoxylate-atropine 2.5 mg-0.025 mg tablet   take 2 tablets by mouth immediately then 1 tablet by mouth AFTER ...  (REFER TO PRESCRIPTION NOTES).   • docusate sodium (COLACE) 100 MG capsule Take 100 mg by mouth Daily.   • EPINEPHrine (EPIPEN) 0.3 MG/0.3ML solution auto-injector injection Daily.   • esomeprazole (nexIUM) 40 MG capsule Nexium 40 mg capsule,delayed release   • fentaNYL (DURAGESIC) 50 MCG/HR patch Place 1 patch on the skin as directed by provider Every 72 (Seventy-Two) Hours.   • ferrous sulfate 325 (65 FE) MG tablet Take 1 tablet by mouth Daily With Breakfast.   • fesoterodine fumarate (TOVIAZ) 8 MG tablet sustained-release 24 hour tablet Toviaz 8 mg tablet,extended release   • fexofenadine (ALLEGRA) 180 MG  tablet Take 180 mg by mouth Daily.   • fluconazole (DIFLUCAN) 200 MG tablet fluconazole 200 mg tablet   • furosemide (LASIX) 20 MG tablet Take 20 mg by mouth Daily.   • hydrALAZINE (APRESOLINE) 25 MG tablet Take 1 tablet by mouth 3 (Three) Times a Day.   • levothyroxine (SYNTHROID, LEVOTHROID) 50 MCG tablet Take 1 tablet by mouth Daily.   • lidocaine (XYLOCAINE,URO-JET) 2 % gel apply TO mucosal membrane THREE TIMES DAILY AS NEEDED AS directed   • lisinopril (PRINIVIL,ZESTRIL) 20 MG tablet lisinopril 20 mg tablet   • LORazepam (ATIVAN) 2 MG tablet lorazepam 2 mg tablet   • lubiprostone (AMITIZA) 24 MCG capsule Amitiza 24 mcg capsule   • meclizine 25 MG chewable tablet chewable tablet Chew 25 mg 2 (Two) Times a Day As Needed.   • melatonin 5 MG tablet tablet Take 5 mg by mouth At Night As Needed.   • metoclopramide (REGLAN) 10 MG tablet metoclopramide 10 mg tablet   • Milnacipran HCl (SAVELLA) 100 MG tablet Savella 100 mg tablet   • montelukast (SINGULAIR) 10 MG tablet montelukast 10 mg tablet   • Naloxegol Oxalate (MOVANTIK) 25 MG tablet Take 25 mg by mouth Every Morning.   • NAMZARIC 28-10 MG capsule sustained-release 24 hr Take 1 capsule by mouth Daily.   • nitrofurantoin, macrocrystal-monohydrate, (MACROBID) 100 MG capsule Take 1 capsule by mouth 2 (Two) Times a Day.   • omeprazole (priLOSEC) 40 MG capsule Take 40 mg by mouth 2 (Two) Times a Day.   • oxybutynin (OXYTROL) 3.9 MG/24HR Oxytrol 3.9 mg/24 hr transdermal patch   • oxybutynin XL (DITROPAN-XL) 10 MG 24 hr tablet Take 1 tablet by mouth Daily.   • polyethylene glycol (MIRALAX) packet Take 17 g by mouth As Needed.   • potassium chloride (K-DUR) 10 MEQ CR tablet Take 10 mEq by mouth 2 (Two) Times a Day.   • predniSONE (DELTASONE) 5 MG tablet Take 5 mg by mouth Daily.   • RESTASIS 0.05 % ophthalmic emulsion 2 (Two) Times a Day.   • rOPINIRole (REQUIP) 1 MG tablet Take 1 tablet by mouth Every Night. Take 1 hour before bedtime.   • sertraline (ZOLOFT) 100 MG  tablet Take 100 mg by mouth Daily.   • SUMAtriptan (IMITREX) 25 MG tablet Take 1 tablet by mouth As Needed for Migraine. 1 tab po at onset of HA. If no relief in two hours, may repeat x 1.   • tiZANidine (ZANAFLEX) 4 MG tablet tizanidine 4 mg tablet   • tolterodine LA (DETROL LA) 4 MG 24 hr capsule tolterodine ER 4 mg capsule,extended release 24 hr   TAKE 1 CAPSULE BY MOUTH ONCE DAILY   generic Detral LA   • [DISCONTINUED] DULoxetine (CYMBALTA) 60 MG capsule Take 60 mg by mouth Daily.   • [DISCONTINUED] fluticasone (FLONASE) 50 MCG/ACT nasal spray INHALE TWO SPRAYS EACH NOSTRIL EVERY DAY   • [DISCONTINUED] HYDROcodone-acetaminophen (NORCO) 7.5-325 MG per tablet hydrocodone 7.5 mg-acetaminophen 325 mg tablet   • [DISCONTINUED] traMADol (ULTRAM) 50 MG tablet tramadol 50 mg tablet   take 2 tablets by mouth three times a day   • [DISCONTINUED] vilazodone (VIIBRYD) 10 MG tablet tablet Viibryd 10 mg tablet   take 1/2 tablet by mouth AT NOON daily     No current facility-administered medications on file prior to visit.      Allergies   Allergen Reactions   • Inderal  [Propranolol] Unknown (See Comments)   • L-Methylfolate-Algae-B12-B6 Unknown (See Comments)   • Morphine Nausea And Vomiting   • Rofecoxib Other (See Comments)   • Topiramate Other (See Comments)     Social History     Socioeconomic History   • Marital status:      Spouse name: Not on file   • Number of children: Not on file   • Years of education: Not on file   • Highest education level: Not on file   Tobacco Use   • Smoking status: Former Smoker     Packs/day: 1.50     Years: 30.00     Pack years: 45.00     Types: Cigarettes     Last attempt to quit: 2014     Years since quittin.4   • Smokeless tobacco: Never Used   Substance and Sexual Activity   • Alcohol use: No     Frequency: Never   • Drug use: No   • Sexual activity: Defer   Social History Narrative    She lives alone in [place], is , retired and has three grown children. She  "used to smoke about 1 1/2 ppd for 30 years and quit in 2014. She reports a history of heavy alcohol use for about 40 years and now drinks socially. She does not use recreational drugs.     Family History   Problem Relation Age of Onset   • Breast cancer Maternal Aunt    • Leukemia Maternal Aunt      The following portions of the patient's history were reviewed and updated as appropriate: allergies, current medications, past surgical history and problem list.    Review of Systems   Constitutional: Negative for chills and fever.   Respiratory: Negative for cough and shortness of breath.    Cardiovascular: Negative for chest pain.   Gastrointestinal: Negative for abdominal pain.   Neurological: Negative for light-headedness and headache.       Objective   /60 (BP Location: Left arm, Patient Position: Sitting, Cuff Size: Adult)   Pulse 75   Temp 98.7 °F (37.1 °C) (Oral)   Resp 16   Ht 160 cm (63\")   Wt 48.1 kg (106 lb)   SpO2 99%   BMI 18.78 kg/m²   Physical Exam   Constitutional: She is oriented to person, place, and time. She appears well-nourished. No distress.   Small statured elderly WF NAD.     HENT:   Head: Normocephalic and atraumatic.   Eyes: Pupils are equal, round, and reactive to light. EOM are normal.   Neck: Neck supple. No JVD present. No thyromegaly present.   Cardiovascular: Normal rate, regular rhythm and normal heart sounds.   No murmur heard.  Pulmonary/Chest: Effort normal and breath sounds normal. No respiratory distress.   Abdominal: Soft. Bowel sounds are normal. She exhibits no distension. There is no tenderness. There is no guarding.   Musculoskeletal:   Muscle wasting in all 4 extremities   Neurological: She is alert and oriented to person, place, and time.   Skin: Skin is warm and dry. No rash noted.   Psychiatric: She has a normal mood and affect. Her behavior is normal.         Hospital Outpatient Visit on 12/13/2019   Component Date Value Ref Range Status   • Glucose " 12/13/2019 148* 65 - 99 mg/dL Final   • BUN 12/13/2019 23  8 - 23 mg/dL Final   • Creatinine 12/13/2019 1.10* 0.57 - 1.00 mg/dL Final   • Sodium 12/13/2019 136  136 - 145 mmol/L Final   • Potassium 12/13/2019 4.6  3.5 - 5.2 mmol/L Final   • Chloride 12/13/2019 100  98 - 107 mmol/L Final   • CO2 12/13/2019 22.0  22.0 - 29.0 mmol/L Final   • Calcium 12/13/2019 9.1  8.6 - 10.5 mg/dL Final   • Total Protein 12/13/2019 7.3  6.0 - 8.5 g/dL Final   • Albumin 12/13/2019 4.10  3.50 - 5.20 g/dL Final   • ALT (SGPT) 12/13/2019 61* 1 - 33 U/L Final   • AST (SGOT) 12/13/2019 65* 1 - 32 U/L Final   • Alkaline Phosphatase 12/13/2019 103  39 - 117 U/L Final   • Total Bilirubin 12/13/2019 0.3  0.2 - 1.2 mg/dL Final   • eGFR Non African Amer 12/13/2019 48* >60 mL/min/1.73 Final   • Globulin 12/13/2019 3.2  gm/dL Final   • A/G Ratio 12/13/2019 1.3  g/dL Final   • BUN/Creatinine Ratio 12/13/2019 20.9  7.0 - 25.0 Final   • Anion Gap 12/13/2019 14.0  5.0 - 15.0 mmol/L Final   • TSH 12/13/2019 1.820  0.270 - 4.200 uIU/mL Final   • Free T4 12/13/2019 1.08  0.93 - 1.70 ng/dL Final    Results may be falsely increased if patient taking Biotin.   • WBC 12/13/2019 6.69  3.40 - 10.80 10*3/mm3 Final   • RBC 12/13/2019 3.44* 3.77 - 5.28 10*6/mm3 Final   • Hemoglobin 12/13/2019 10.6* 12.0 - 15.9 g/dL Final   • Hematocrit 12/13/2019 33.8* 34.0 - 46.6 % Final   • MCV 12/13/2019 98.3* 79.0 - 97.0 fL Final   • MCH 12/13/2019 30.8  26.6 - 33.0 pg Final   • MCHC 12/13/2019 31.4* 31.5 - 35.7 g/dL Final   • RDW 12/13/2019 13.2  12.3 - 15.4 % Final   • RDW-SD 12/13/2019 45.4  37.0 - 54.0 fl Final   • MPV 12/13/2019 8.4  6.0 - 12.0 fL Final   • Platelets 12/13/2019 284  140 - 450 10*3/mm3 Final   • Neutrophil % 12/13/2019 73.7  42.7 - 76.0 % Final   • Lymphocyte % 12/13/2019 15.5* 19.6 - 45.3 % Final   • Monocyte % 12/13/2019 6.7  5.0 - 12.0 % Final   • Eosinophil % 12/13/2019 3.1  0.3 - 6.2 % Final   • Basophil % 12/13/2019 1.0  0.0 - 1.5 % Final   •  Neutrophils, Absolute 12/13/2019 4.92  1.70 - 7.00 10*3/mm3 Final   • Lymphocytes, Absolute 12/13/2019 1.04  0.70 - 3.10 10*3/mm3 Final   • Monocytes, Absolute 12/13/2019 0.45  0.10 - 0.90 10*3/mm3 Final   • Eosinophils, Absolute 12/13/2019 0.21  0.00 - 0.40 10*3/mm3 Final   • Basophils, Absolute 12/13/2019 0.07  0.00 - 0.20 10*3/mm3 Final   Hospital Outpatient Visit on 11/22/2019   Component Date Value Ref Range Status   • Glucose 11/22/2019 112* 65 - 99 mg/dL Final   • BUN 11/22/2019 31* 8 - 23 mg/dL Final   • Creatinine 11/22/2019 1.27* 0.57 - 1.00 mg/dL Final   • Sodium 11/22/2019 134* 136 - 145 mmol/L Final   • Potassium 11/22/2019 5.0  3.5 - 5.2 mmol/L Final   • Chloride 11/22/2019 99  98 - 107 mmol/L Final   • CO2 11/22/2019 24.0  22.0 - 29.0 mmol/L Final   • Calcium 11/22/2019 9.6  8.6 - 10.5 mg/dL Final   • Total Protein 11/22/2019 7.0  6.0 - 8.5 g/dL Final   • Albumin 11/22/2019 4.10  3.50 - 5.20 g/dL Final   • ALT (SGPT) 11/22/2019 33  1 - 33 U/L Final   • AST (SGOT) 11/22/2019 41* 1 - 32 U/L Final   • Alkaline Phosphatase 11/22/2019 119* 39 - 117 U/L Final   • Total Bilirubin 11/22/2019 <0.2* 0.2 - 1.2 mg/dL Final   • eGFR Non African Amer 11/22/2019 40* >60 mL/min/1.73 Final   • Globulin 11/22/2019 2.9  gm/dL Final   • A/G Ratio 11/22/2019 1.4  g/dL Final   • BUN/Creatinine Ratio 11/22/2019 24.4  7.0 - 25.0 Final   • Anion Gap 11/22/2019 11.0  5.0 - 15.0 mmol/L Final   • WBC 11/22/2019 7.35  3.40 - 10.80 10*3/mm3 Final   • RBC 11/22/2019 3.27* 3.77 - 5.28 10*6/mm3 Final   • Hemoglobin 11/22/2019 10.4* 12.0 - 15.9 g/dL Final   • Hematocrit 11/22/2019 32.1* 34.0 - 46.6 % Final   • MCV 11/22/2019 98.2* 79.0 - 97.0 fL Final   • MCH 11/22/2019 31.8  26.6 - 33.0 pg Final   • MCHC 11/22/2019 32.4  31.5 - 35.7 g/dL Final   • RDW 11/22/2019 12.8  12.3 - 15.4 % Final   • RDW-SD 11/22/2019 44.4  37.0 - 54.0 fl Final   • MPV 11/22/2019 8.0  6.0 - 12.0 fL Final   • Platelets 11/22/2019 381  140 - 450 10*3/mm3 Final    • Neutrophil % 11/22/2019 71.2  42.7 - 76.0 % Final   • Lymphocyte % 11/22/2019 15.9* 19.6 - 45.3 % Final   • Monocyte % 11/22/2019 8.2  5.0 - 12.0 % Final   • Eosinophil % 11/22/2019 3.7  0.3 - 6.2 % Final   • Basophil % 11/22/2019 1.0  0.0 - 1.5 % Final   • Neutrophils, Absolute 11/22/2019 5.24  1.70 - 7.00 10*3/mm3 Final   • Lymphocytes, Absolute 11/22/2019 1.17  0.70 - 3.10 10*3/mm3 Final   • Monocytes, Absolute 11/22/2019 0.60  0.10 - 0.90 10*3/mm3 Final   • Eosinophils, Absolute 11/22/2019 0.27  0.00 - 0.40 10*3/mm3 Final   • Basophils, Absolute 11/22/2019 0.07  0.00 - 0.20 10*3/mm3 Final   Hospital Outpatient Visit on 10/30/2019   Component Date Value Ref Range Status   • Glucose 10/30/2019 97  65 - 99 mg/dL Final   • BUN 10/30/2019 26* 8 - 23 mg/dL Final   • Creatinine 10/30/2019 1.33* 0.57 - 1.00 mg/dL Final   • Sodium 10/30/2019 137  136 - 145 mmol/L Final   • Potassium 10/30/2019 5.0  3.5 - 5.2 mmol/L Final   • Chloride 10/30/2019 101  98 - 107 mmol/L Final   • CO2 10/30/2019 23.0  22.0 - 29.0 mmol/L Final   • Calcium 10/30/2019 10.0  8.6 - 10.5 mg/dL Final   • Total Protein 10/30/2019 7.5  6.0 - 8.5 g/dL Final   • Albumin 10/30/2019 4.20  3.50 - 5.20 g/dL Final   • ALT (SGPT) 10/30/2019 11  1 - 33 U/L Final   • AST (SGOT) 10/30/2019 24  1 - 32 U/L Final   • Alkaline Phosphatase 10/30/2019 109  39 - 117 U/L Final   • Total Bilirubin 10/30/2019 0.2  0.2 - 1.2 mg/dL Final   • eGFR Non African Amer 10/30/2019 38* >60 mL/min/1.73 Final   • Globulin 10/30/2019 3.3  gm/dL Final   • A/G Ratio 10/30/2019 1.3  g/dL Final   • BUN/Creatinine Ratio 10/30/2019 19.5  7.0 - 25.0 Final   • Anion Gap 10/30/2019 13.0  5.0 - 15.0 mmol/L Final   • TSH 10/30/2019 4.440* 0.270 - 4.200 uIU/mL Final   • Free T4 10/30/2019 1.05  0.93 - 1.70 ng/dL Final    Results may be falsely increased if patient taking Biotin.   • WBC 10/30/2019 6.77  3.40 - 10.80 10*3/mm3 Final   • RBC 10/30/2019 3.16* 3.77 - 5.28 10*6/mm3 Final   •  Hemoglobin 10/30/2019 10.0* 12.0 - 15.9 g/dL Final   • Hematocrit 10/30/2019 31.5* 34.0 - 46.6 % Final   • MCV 10/30/2019 99.7* 79.0 - 97.0 fL Final   • MCH 10/30/2019 31.6  26.6 - 33.0 pg Final   • MCHC 10/30/2019 31.7  31.5 - 35.7 g/dL Final   • RDW 10/30/2019 12.5  12.3 - 15.4 % Final   • RDW-SD 10/30/2019 44.0  37.0 - 54.0 fl Final   • MPV 10/30/2019 8.4  6.0 - 12.0 fL Final   • Platelets 10/30/2019 360  140 - 450 10*3/mm3 Final   • Neutrophil % 10/30/2019 64.3  42.7 - 76.0 % Final   • Lymphocyte % 10/30/2019 25.0  19.6 - 45.3 % Final   • Monocyte % 10/30/2019 6.2  5.0 - 12.0 % Final   • Eosinophil % 10/30/2019 3.2  0.3 - 6.2 % Final   • Basophil % 10/30/2019 1.3  0.0 - 1.5 % Final   • Neutrophils, Absolute 10/30/2019 4.35  1.70 - 7.00 10*3/mm3 Final   • Lymphocytes, Absolute 10/30/2019 1.69  0.70 - 3.10 10*3/mm3 Final   • Monocytes, Absolute 10/30/2019 0.42  0.10 - 0.90 10*3/mm3 Final   • Eosinophils, Absolute 10/30/2019 0.22  0.00 - 0.40 10*3/mm3 Final   • Basophils, Absolute 10/30/2019 0.09  0.00 - 0.20 10*3/mm3 Final   Hospital Outpatient Visit on 09/12/2019   Component Date Value Ref Range Status   • Protime 09/12/2019 9.9  9.6 - 11.7 Seconds Final   • INR 09/12/2019 0.96  0.90 - 1.10 Final   • PTT 09/12/2019 20.5* 24.0 - 31.0 seconds Final   • WBC 09/12/2019 8.70  3.40 - 10.80 10*3/mm3 Final   • RBC 09/12/2019 3.27* 3.77 - 5.28 10*6/mm3 Final   • Hemoglobin 09/12/2019 10.6* 12.0 - 15.9 g/dL Final   • Hematocrit 09/12/2019 31.7* 34.0 - 46.6 % Final   • MCV 09/12/2019 96.9  79.0 - 97.0 fL Final   • MCH 09/12/2019 32.4  26.6 - 33.0 pg Final   • MCHC 09/12/2019 33.5  31.5 - 35.7 g/dL Final   • RDW 09/12/2019 12.6  12.3 - 15.4 % Final   • RDW-SD 09/12/2019 43.3  37.0 - 54.0 fl Final   • MPV 09/12/2019 6.8  6.0 - 12.0 fL Final   • Platelets 09/12/2019 286  140 - 450 10*3/mm3 Final   • Neutrophil % 09/12/2019 52.7  42.7 - 76.0 % Final   • Lymphocyte % 09/12/2019 35.4  19.6 - 45.3 % Final   • Monocyte % 09/12/2019  8.0  5.0 - 12.0 % Final   • Eosinophil % 09/12/2019 2.8  0.3 - 6.2 % Final   • Basophil % 09/12/2019 1.1  0.0 - 1.5 % Final   • Neutrophils, Absolute 09/12/2019 4.60  1.70 - 7.00 10*3/mm3 Final   • Lymphocytes, Absolute 09/12/2019 3.10  0.70 - 3.10 10*3/mm3 Final   • Monocytes, Absolute 09/12/2019 0.70  0.10 - 0.90 10*3/mm3 Final   • Eosinophils, Absolute 09/12/2019 0.20  0.00 - 0.40 10*3/mm3 Final   • Basophils, Absolute 09/12/2019 0.10  0.00 - 0.20 10*3/mm3 Final   • nRBC 09/12/2019 0.1  0.0 - 0.2 /100 WBC Final   • Case Report 09/12/2019    Final                    Value:Surgical Pathology Report                         Case: [id number]                                  Authorizing Provider:  Sharon Rivera MD             Collected:           09/12/2019 09:07 AM          Ordering Location:     [facility]    Received:            09/12/2019 01:30 PM          Pathologist:           Yoan Humphrey MD                                                           Specimen:    Lung, Left Lower Lobe                                                                     • Caris, Addendum 09/12/2019    Final                    Value:This result contains rich text formatting which cannot be displayed here.   • Final Diagnosis 09/12/2019    Final                    Value:This result contains rich text formatting which cannot be displayed here.   • Comment 09/12/2019    Final                    Value:This result contains rich text formatting which cannot be displayed here.   • Intraoperative Consultation 09/12/2019    Final                    Value:This result contains rich text formatting which cannot be displayed here.   • Gross Description 09/12/2019    Final                    Value:This result contains rich text formatting which cannot be displayed here.   Office Visit on 08/30/2019   Component Date Value Ref Range Status   • WBC 08/30/2019 6.99  3.40 - 10.80 10*3/mm3 Final   • RBC 08/30/2019 2.96* 3.77 - 5.28  10*6/mm3 Final   • Hemoglobin 08/30/2019 9.8* 12.0 - 15.9 g/dL Final   • Hematocrit 08/30/2019 30.2* 34.0 - 46.6 % Final   • MCV 08/30/2019 102.0* 79.0 - 97.0 fL Final   • MCH 08/30/2019 33.1* 26.6 - 33.0 pg Final   • MCHC 08/30/2019 32.5  31.5 - 35.7 g/dL Final   • RDW 08/30/2019 12.2* 12.3 - 15.4 % Final   • RDW-SD 08/30/2019 43.9  37.0 - 54.0 fl Final   • MPV 08/30/2019 8.9  6.0 - 12.0 fL Final   • Platelets 08/30/2019 271  140 - 450 10*3/mm3 Final   • Neutrophil % 08/30/2019 64.5  42.7 - 76.0 % Final   • Lymphocyte % 08/30/2019 25.5  19.6 - 45.3 % Final   • Monocyte % 08/30/2019 6.9  5.0 - 12.0 % Final   • Eosinophil % 08/30/2019 2.1  0.3 - 6.2 % Final   • Basophil % 08/30/2019 1.0  0.0 - 1.5 % Final   • Neutrophils, Absolute 08/30/2019 4.51  1.70 - 7.00 10*3/mm3 Final   • Lymphocytes, Absolute 08/30/2019 1.78  0.70 - 3.10 10*3/mm3 Final   • Monocytes, Absolute 08/30/2019 0.48  0.10 - 0.90 10*3/mm3 Final   • Eosinophils, Absolute 08/30/2019 0.15  0.00 - 0.40 10*3/mm3 Final   • Basophils, Absolute 08/30/2019 0.07  0.00 - 0.20 10*3/mm3 Final   • Iron 08/30/2019 45  28 - 170 mcg/dL Final   • Iron Saturation 08/30/2019 16  15 - 50 % Final   • Transferrin 08/30/2019 198* 200 - 360 mg/dL Final   • TIBC 08/30/2019 277  228 - 428 mcg/dL Final   • Ferritin 08/30/2019 295.00  11.00 - 307.00 ng/mL Final    Results may be falsely decreased if patient taking Biotin.   • Vitamin B-12 08/30/2019 889  180 - 914 pg/mL Final    Results may be falsely increased if patient taking Biotin.   • Folate 08/30/2019 9.60  5.90 - 24.80 ng/mL Final         Assessment/Plan   Diagnoses and all orders for this visit:    1. Primary cancer of left lower lobe of lung (CMS/HCC) (Primary)    2. Metastasis to bone (CMS/HCC)    3. Liver metastasis (CMS/HCC)    4. Degeneration of lumbar intervertebral disc  -     Ambulatory Referral to Pain Management    Other orders  -     fluticasone (FLONASE) 50 MCG/ACT nasal spray; 2 sprays into the nostril(s) as  directed by provider Daily.  Dispense: 1 bottle; Refill: 5  -     DULoxetine (CYMBALTA) 60 MG capsule; Take 1 capsule by mouth Daily.  Dispense: 30 capsule; Refill: 5    Refill medicines as above.  I have asked her daughter to double check the circumstances about the fentanyl patches.  If indeed she has 10, we will not prescribe that until she is due next month.  Will call [facility] and get a fill history so we can begin cleaning up her medication list.  Of asked her to bring in all of her medicines to the next visit.  She has numerous duplicates on her medication list, and she is simply not able to tell me what she takes and what she does not.  We will make a referral to pain management for consideration of steroid injections.  We will keep medical pain management here unless she changes her mind.  Keep follow-up with Dr. Mcgregor for anxiety and mood disorder.         Return in about 4 weeks (around 1/14/2020).    Call with any problems or concerns before next visit

## 2019-12-17 NOTE — TELEPHONE ENCOUNTER
----- Message from Karen Goldman MA sent at 12/16/2019  9:15 AM EST -----  Pt needs a follow up appointment with Dr. Batista in about 4 weeks.  ----- Message -----  From: Alfonso Batista MD  Sent: 12/13/2019   8:18 PM EST  To: Dannielle Lim LPN, Karen Goldman MA     Need to set up follow-up appointment in about 4 weeks with me.

## 2019-12-26 ENCOUNTER — OFFICE VISIT (OUTPATIENT)
Dept: FAMILY MEDICINE CLINIC | Facility: CLINIC | Age: 79
End: 2019-12-26

## 2019-12-26 VITALS
RESPIRATION RATE: 18 BRPM | BODY MASS INDEX: 18.25 KG/M2 | HEIGHT: 63 IN | SYSTOLIC BLOOD PRESSURE: 170 MMHG | HEART RATE: 72 BPM | DIASTOLIC BLOOD PRESSURE: 67 MMHG | OXYGEN SATURATION: 99 % | TEMPERATURE: 98 F | WEIGHT: 103 LBS

## 2019-12-26 DIAGNOSIS — L03.119 CELLULITIS OF LOWER EXTREMITY, UNSPECIFIED LATERALITY: ICD-10-CM

## 2019-12-26 DIAGNOSIS — R60.0 EDEMA LEG: Primary | ICD-10-CM

## 2019-12-26 DIAGNOSIS — R06.02 SHORTNESS OF BREATH: ICD-10-CM

## 2019-12-26 PROCEDURE — 99213 OFFICE O/P EST LOW 20 MIN: CPT | Performed by: NURSE PRACTITIONER

## 2019-12-26 RX ORDER — DOXYCYCLINE HYCLATE 100 MG/1
100 CAPSULE ORAL 2 TIMES DAILY
Qty: 20 CAPSULE | Refills: 0 | Status: SHIPPED | OUTPATIENT
Start: 2019-12-26 | End: 2020-01-05

## 2019-12-26 NOTE — PATIENT INSTRUCTIONS
Take antibiotic as directed with food until gone   keep legs elevated   blood work today   follow up with Dr. Fletcher

## 2019-12-26 NOTE — PROGRESS NOTES
Subjective   Ann Andre is a 79 y.o. female.      79-year-old white female who comes in with 1 week history of leg swelling redness and skin peeling off bilateral lower extremity.  She stated the leg started swelling and became red and 1 spot opened up that she has Steri-Strips on on her right lower ankle.  She states this morning the swelling has gone down.  Legs are warm to touch and red including her feet and I am placing her on doxycycline and getting a BNP today.  I instructed her to elevate legs as much as possible take antibiotics as directed and follow up with Dr. Fletcher   blood pressure elevated today 170/66 but she states at home it runs much lower she denies any chest pain, tachycardia dizziness or edema.  There are rales in her bases     doxycycline 100 mg twice a day times 10 days   BNP   elevate legs       The following portions of the patient's history were reviewed and updated as appropriate: allergies, current medications, past family history, past medical history, past social history, past surgical history and problem list.    Review of Systems   Constitutional: Negative.    HENT: Negative.    Respiratory: Positive for shortness of breath.    Cardiovascular: Positive for leg swelling.   Gastrointestinal: Negative.    Genitourinary: Negative.    Skin: Positive for color change.   Neurological: Negative.    Psychiatric/Behavioral: Negative.        Objective   Physical Exam   Constitutional: She is oriented to person, place, and time. She appears well-developed and well-nourished.   Cardiovascular: Normal rate and regular rhythm.   Murmur heard.  Pulmonary/Chest: Effort normal.   Rales in bases   Abdominal: Bowel sounds are normal.   Musculoskeletal: She exhibits edema.   Neurological: She is oriented to person, place, and time.   Skin:   Lower ext red, with peeling skin and warm to touch. Swelling started to go down this am   Psychiatric: She has a normal mood and affect.          Assessment/Plan   Ann was seen today for edema.    Diagnoses and all orders for this visit:    Edema leg  -     proBNP    Shortness of breath   -     proBNP    Cellulitis of lower extremity, unspecified laterality    Other orders  -     doxycycline (VIBRAMYCIN) 100 MG capsule; Take 1 capsule by mouth 2 (Two) Times a Day for 10 days.

## 2019-12-27 LAB — NT-PROBNP SERPL-MCNC: 3650 PG/ML (ref 0–738)

## 2019-12-28 DIAGNOSIS — R06.02 SHORTNESS OF BREATH: Primary | ICD-10-CM

## 2019-12-30 DIAGNOSIS — G43.009 MIGRAINE WITHOUT AURA AND WITHOUT STATUS MIGRAINOSUS, NOT INTRACTABLE: Primary | ICD-10-CM

## 2019-12-30 RX ORDER — BUTALBITAL, ACETAMINOPHEN AND CAFFEINE 50; 325; 40 MG/1; MG/1; MG/1
1 TABLET ORAL 2 TIMES DAILY PRN
Qty: 60 TABLET | Refills: 0 | Status: SHIPPED | OUTPATIENT
Start: 2019-12-30 | End: 2020-01-03

## 2020-01-02 ENCOUNTER — TELEPHONE (OUTPATIENT)
Dept: ONCOLOGY | Facility: CLINIC | Age: 80
End: 2020-01-02

## 2020-01-02 DIAGNOSIS — C34.32 PRIMARY CANCER OF LEFT LOWER LOBE OF LUNG (HCC): ICD-10-CM

## 2020-01-02 DIAGNOSIS — C34.32 MALIGNANT NEOPLASM OF LOWER LOBE OF LEFT LUNG (HCC): Primary | ICD-10-CM

## 2020-01-02 DIAGNOSIS — Z79.899 ENCOUNTER FOR LONG-TERM (CURRENT) USE OF OTHER MEDICATIONS: ICD-10-CM

## 2020-01-02 RX ORDER — SODIUM CHLORIDE 9 MG/ML
250 INJECTION, SOLUTION INTRAVENOUS ONCE
Status: CANCELLED | OUTPATIENT
Start: 2020-01-10

## 2020-01-02 NOTE — TELEPHONE ENCOUNTER
I called pt's daughter, Ena to let her know that NP has ordered CT scans to be done at [facility] in order to keep her treatment approved. She did not answer, so I LVM stating this information.

## 2020-01-02 NOTE — TELEPHONE ENCOUNTER
Juanita Murphy, APRN --> Sprigler, Dava M, RegSched Rep  Cc: Dannielle Lim, SETH; Karen Goldman, MA; Randi Patel, Ezekiel Rep         Mikael  - I have ordered a CT scan for CT CAP without contrast for patient.     Karen/Dannielle - Please advise pt a CT scan was ordered per insurance request in order to keep her treatment approved.  I ordered it for [place].     Randi - Will you make sure this order is correct?

## 2020-01-02 NOTE — PROGRESS NOTES
Ordered CT CAP without contrast at [facility].   Asked medical assistant to call and notify pt.   Sent response to Dava Sprigler.       Sprigler, Dava M, Alfonso Camp MD; Juanita Murphy APRN             Patient had an insurance change on 1-1-20. We were only given 3 weeks approval to give 1 more dose of Keytruda. No future approvals will be given until new scans are performed. Last scans were done July 2019.          Electronically signed by JENNIFER Drew, 01/02/20, 4:18 PM.

## 2020-01-03 RX ORDER — BUTALBITAL, ACETAMINOPHEN AND CAFFEINE 50; 325; 40 MG/1; MG/1; MG/1
1 TABLET ORAL 2 TIMES DAILY PRN
Qty: 60 TABLET | Refills: 0 | Status: SHIPPED | OUTPATIENT
Start: 2020-01-03 | End: 2020-03-10 | Stop reason: SDUPTHER

## 2020-01-06 ENCOUNTER — TELEPHONE (OUTPATIENT)
Dept: FAMILY MEDICINE CLINIC | Facility: CLINIC | Age: 80
End: 2020-01-06

## 2020-01-06 NOTE — TELEPHONE ENCOUNTER
I got a message back about Ann's swelling in her legs.  It came as a forwarded chart, and I do not know how to continue a conversation like that.      In short, please call her daughter back and advise her that I would like Ann to continue taking the 1 Lasix per day until I see her on the 14th.  If anything changes, or she begins to swell more before then, call back and let me know.  Thanks

## 2020-01-10 ENCOUNTER — HOSPITAL ENCOUNTER (OUTPATIENT)
Dept: ONCOLOGY | Facility: HOSPITAL | Age: 80
Setting detail: INFUSION SERIES
Discharge: HOME | End: 2020-01-10

## 2020-01-10 VITALS
HEART RATE: 79 BPM | TEMPERATURE: 97.7 F | SYSTOLIC BLOOD PRESSURE: 177 MMHG | RESPIRATION RATE: 18 BRPM | WEIGHT: 95 LBS | HEIGHT: 63 IN | BODY MASS INDEX: 16.83 KG/M2 | DIASTOLIC BLOOD PRESSURE: 73 MMHG

## 2020-01-10 DIAGNOSIS — R60.0 LOCALIZED EDEMA: Primary | ICD-10-CM

## 2020-01-10 DIAGNOSIS — Z79.899 ENCOUNTER FOR LONG-TERM (CURRENT) USE OF OTHER MEDICATIONS: Primary | ICD-10-CM

## 2020-01-10 DIAGNOSIS — C34.32 PRIMARY CANCER OF LEFT LOWER LOBE OF LUNG (HCC): Primary | ICD-10-CM

## 2020-01-10 DIAGNOSIS — C79.51 METASTASIS TO BONE (HCC): ICD-10-CM

## 2020-01-10 DIAGNOSIS — C34.32 PRIMARY CANCER OF LEFT LOWER LOBE OF LUNG (HCC): ICD-10-CM

## 2020-01-10 LAB
ALBUMIN SERPL-MCNC: 3.8 G/DL (ref 3.5–5.2)
ALBUMIN/GLOB SERPL: 1.2 G/DL
ALP SERPL-CCNC: 122 U/L (ref 39–117)
ALT SERPL W P-5'-P-CCNC: 85 U/L (ref 1–33)
ANION GAP SERPL CALCULATED.3IONS-SCNC: 14 MMOL/L (ref 5–15)
AST SERPL-CCNC: 89 U/L (ref 1–32)
BASOPHILS # BLD AUTO: 0.08 10*3/MM3 (ref 0–0.2)
BASOPHILS NFR BLD AUTO: 1 % (ref 0–1.5)
BILIRUB SERPL-MCNC: 0.4 MG/DL (ref 0.2–1.2)
BUN BLD-MCNC: 37 MG/DL (ref 8–23)
BUN/CREAT SERPL: 27.6 (ref 7–25)
CALCIUM SPEC-SCNC: 9.8 MG/DL (ref 8.6–10.5)
CHLORIDE SERPL-SCNC: 98 MMOL/L (ref 98–107)
CO2 SERPL-SCNC: 25 MMOL/L (ref 22–29)
CREAT BLD-MCNC: 1.34 MG/DL (ref 0.57–1)
DEPRECATED RDW RBC AUTO: 44.1 FL (ref 37–54)
EOSINOPHIL # BLD AUTO: 0.17 10*3/MM3 (ref 0–0.4)
EOSINOPHIL NFR BLD AUTO: 2.2 % (ref 0.3–6.2)
ERYTHROCYTE [DISTWIDTH] IN BLOOD BY AUTOMATED COUNT: 13.1 % (ref 12.3–15.4)
GFR SERPL CREATININE-BSD FRML MDRD: 38 ML/MIN/1.73
GLOBULIN UR ELPH-MCNC: 3.1 GM/DL
GLUCOSE BLD-MCNC: 120 MG/DL (ref 65–99)
HCT VFR BLD AUTO: 34 % (ref 34–46.6)
HGB BLD-MCNC: 11.1 G/DL (ref 12–15.9)
LYMPHOCYTES # BLD AUTO: 1.29 10*3/MM3 (ref 0.7–3.1)
LYMPHOCYTES NFR BLD AUTO: 16.4 % (ref 19.6–45.3)
MCH RBC QN AUTO: 31.5 PG (ref 26.6–33)
MCHC RBC AUTO-ENTMCNC: 32.6 G/DL (ref 31.5–35.7)
MCV RBC AUTO: 96.6 FL (ref 79–97)
MONOCYTES # BLD AUTO: 0.95 10*3/MM3 (ref 0.1–0.9)
MONOCYTES NFR BLD AUTO: 12.1 % (ref 5–12)
NEUTROPHILS # BLD AUTO: 5.38 10*3/MM3 (ref 1.7–7)
NEUTROPHILS NFR BLD AUTO: 68.3 % (ref 42.7–76)
PLATELET # BLD AUTO: 290 10*3/MM3 (ref 140–450)
PMV BLD AUTO: 9.1 FL (ref 6–12)
POTASSIUM BLD-SCNC: 4.3 MMOL/L (ref 3.5–5.2)
PROT SERPL-MCNC: 6.9 G/DL (ref 6–8.5)
RBC # BLD AUTO: 3.52 10*6/MM3 (ref 3.77–5.28)
SODIUM BLD-SCNC: 137 MMOL/L (ref 136–145)
WBC NRBC COR # BLD: 7.87 10*3/MM3 (ref 3.4–10.8)

## 2020-01-10 PROCEDURE — 25010000002 PEMBROLIZUMAB 100 MG/4ML SOLUTION 4 ML VIAL: Performed by: NURSE PRACTITIONER

## 2020-01-10 PROCEDURE — 96372 THER/PROPH/DIAG INJ SC/IM: CPT | Performed by: INTERNAL MEDICINE

## 2020-01-10 PROCEDURE — 80053 COMPREHEN METABOLIC PANEL: CPT | Performed by: NURSE PRACTITIONER

## 2020-01-10 PROCEDURE — 85025 COMPLETE CBC W/AUTO DIFF WBC: CPT | Performed by: NURSE PRACTITIONER

## 2020-01-10 PROCEDURE — 96413 CHEMO IV INFUSION 1 HR: CPT | Performed by: INTERNAL MEDICINE

## 2020-01-10 PROCEDURE — 36415 COLL VENOUS BLD VENIPUNCTURE: CPT | Performed by: INTERNAL MEDICINE

## 2020-01-10 PROCEDURE — 25010000002 DENOSUMAB 120 MG/1.7ML SOLUTION: Performed by: INTERNAL MEDICINE

## 2020-01-10 RX ORDER — SODIUM CHLORIDE 9 MG/ML
250 INJECTION, SOLUTION INTRAVENOUS ONCE
Status: DISCONTINUED | OUTPATIENT
Start: 2020-01-10 | End: 2020-01-11 | Stop reason: HOSPADM

## 2020-01-10 RX ADMIN — SODIUM CHLORIDE 200 MG: 9 INJECTION, SOLUTION INTRAVENOUS at 15:09

## 2020-01-10 RX ADMIN — DENOSUMAB 120 MG: 120 INJECTION SUBCUTANEOUS at 15:42

## 2020-01-10 NOTE — PROGRESS NOTES
HEMATOLOGY ONCOLOGY FOLLOW UP        Patient name: Ann Andre  : 1940  MRN: [id number]  Primary Care Physician: Rochelle Fletcher MD  Referring Physician: Rochelle Fletcher, *  Reason For Consult:     No chief complaint on file.  Metastatic lung cancer  Chronic lymphocytic leukemia  Metastatic lung cancer    History of Present Illness:  Ms. Andre is a pleasant 78-year-old female who underwent lab work at her Primary Care Physician’s office on 10/27/14 that showed leukocytosis with a white count of 12.1 and normal hemoglobin and platelet count.  Her absolute lymphocyte count is 8,000 (elevated).  ESR was normal at 6.  The patient was therefore referred for hematology for further evaluation of the lymphocytosis.  She also has chronic kidney disease and her creatinine was 1.8 on 10/29/14.      14 - On initial evaluation in our office, WBC 12.2, hemoglobin 10.8, platelet count 311,000, lymphocyte count is almost 6,000.  The patient denied any fever, chills, night sweats, weight loss or lymph node swelling.  She does report having chronic anemia and also had received B12 injections in the past.  She reports history of mild chronic kidney disease.    • 14 - Creatinine 2.3, BUN 27, iron saturation 22%, TIBC 286, serum iron 64, ferritin 232, B12 566, reticulocyte count 1.3%.   • 14 - Flow cytometry:  Immunophenotyping identifies CD5 positive, CD23 positive, Monoclonal copper B cell population representing 24% of the total events.  CD 38 was negative.  This represents CLL.   • 1/22/15 - Bone marrow biopsy:  Variable cellular bone marrow with involvement by SLL/CLL in a nodular pattern.  Flow cytometry involvement 24% by CLL/SLL.  Nodular pattern of spread is considered a good prognostic finding.   Examination with PAS stain revealed several interstitial nodules which occupy 5% to 10% of the total marrow cellularity.  Cytogenetics showed normal karyotype.  CLL FISH panel:   Negative.  Specific probes for TP53 on chromosome 17, NABIL on chromosome 11, chromosome 12 and chromosome 13 were negative.  This indicates good prognosis.    • 2/12/15 - WBC 11.1, hemoglobin 10.9, platelet count 360,000, MCV 98.6.   • 7/14/15 - WBC 9.2, hemoglobin 10.1, platelet count 306,000, MCV 94.1.  • 4/21/15 - Iron saturation 21%, TIBC 264,000, serum iron 56, ferritin 199, EPO level 6, reticulocyte count 1.6%, haptoglobin 124.    • 10/20/15 - WBC 10.7, hemoglobin 11.2, platelet count 348,000.  • 2/16/16 - Iron saturation 25%, TIBC 250, serum iron 63.  WBC 7.7, hemoglobin 10.9, platelet count 293,000, MCV 91.3, lymphocytes 3,900.  • 3/14/16 - Creatinine 1.48.  • 8/30/16 - Lymphocyte count 4,370.  Iron saturation 26%, TIBC 262, serum iron 67.    • 9/6/16 - WBC 11.5, hemoglobin 11.6, platelet count 334,000, MCV 93.6.  • 1/2/17 - WBC 7.2, hemoglobin 10.6, platelet count 310,000, MCV 97.2.    • 5/22/17 - WBC 8.9, hemoglobin 10.7, platelet count 294,000, MCV 99.7.  Absolute lymphocyte count 3.66 (H).  Iron 50, TIBC 205, iron saturation 20%.    • 3/27/18 - WBC 10.1, hemoglobin 11, MCV 96.7, platelet count 301,000.  Serum iron 80, iron saturation 31%, TIBC 256.  Creatinine 1.5, BUN 27.    • 9/20/18 - WBC 9, hemoglobin 10, platelet count 284,000, .5.  Absolute lymphocyte count 4250 (H).  Ferritin 351.  Iron 51, TIBC 197.  • 12/28/18 - Creatinine 1.7, BUN 31.  Calcium 10.7 (H), albumin 4.1.  WBC 8.7, hemoglobin 10.3, platelet count 375,000, .6.    • 1/8/19 - TSH 1.74.  Ferritin 379.  Iron saturation 14%, TIBC 261, serum iron 37 (L).  SPEP appears normal.  Serum immunofixation:  No monoclonal band.  PTH intact 63 (normal range 14-64).  Serum calcium 10.3 (N).    • 3/12/19 - WBC 7.6, hemoglobin 11.5, platelet count 312,000, .9.  Creatinine 1.38, BUN 22, calcium 10.9 (H).  Vitamin D level 49 (N).  • 7/22/2019 CT scan chest without contrast: There is a new mass, multilobulated within the left lower  lobe, measures 3.1 x 2.3 cm.  Adjacent scarring is present as well.  Evidence of prior granulomatous disease, no obvious mediastinal or hilar lymphadenopathy..  Aneurysm of the ascending thoracic aorta.  CT scan was done in the context of left rib cage pain after having a fall.  • 7/18/2019 MRI brain: Possible enhancing lesion seen only on coronal image 8 measures 4.4 mm within the right precentral gyrus.  Per the radiologist, this could be an old infarct or metastases or pulsation artifact.  No acute infarct.  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  FDG avid left hilar lymphadenopathy with SUV of 8.2..  Multiple FDG avid hepatic and osseous lesions compatible with the metastasis.  There are approximately 15-20 FDG avid osseous lesions compatible with mets.  Index lesion involving the left posterior eighth rib demonstrates lytic changes with SUV of 12.9.  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 The biopsy is stained via immunohistochemical technique utilizing appropriate controls for the presence of CK7, CK5/6, p63, TTF-1, and napsin A. The tumor cells are strongly positive for all markers tested and indicate both a squamous and an adenocarcinoma component.  • 9/23/2019 WBC 9.3, hemoglobin 10, platelets 306, .3  • 9/24/2019 brain MRI with and without contrast: No evidence of any brain metastasis.  • October 7, 2019 Next generation sequencing testing by [facility]= PDL 1 by IHC 60%.  Testing is negative for Sylvester BRAF, ROS 1, MET, and NTRK, K-walt, ERBB 2, RET, STK11,TP53.. 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  • 10/30/2019 patient received cycle 1 of Keytruda.  TSH 4.44 high, free T4 1.05 patient received Xgeva  • 10/30/2019 WBC 6.7, hemoglobin 10.0, platelets 360, creatinine 1.33,   • 11/22/2019 patient received Keytruda 200 mg, creatinine 1.27, BUN 31,  • 12/6/2019 patient received Xgeva  • 12/13/2019 patient received Keytruda 200 mg cycle 3.  WBC 6.6, hemoglobin 10.6, platelets 284, creatinine 1.1, AST 65, ALT 61, TSH 1.8, free T4 1.08  • 01/10/2020- Keytruda and Xgeva Cycle 4   • 1/13/2019 CT chest abdomen and pelvis without contrast: Posterior left lower lobe lobulated mass measures larger than on 07/22/2019. It appears slightly different in morphology when compared to the previous study. Disease progression cannot be excluded. However, the measurements may be slightly artifactually increased due to the presence of adjacent atelectasis.. Osteolytic lesions within the left third and eighth ribs, right iliac wing, and questionably within the left iliac wing and right femoral head, consistent with osseous metastatic disease. Additionally, there is thickening and sclerosis of the L4 spinous process and right lamina highly suspicious for metastatic disease  • 1/13/2020 WBC 9.5, hemoglobin 10.1, platelets are 326       Subjective:01/13/2020  79 year old patient presents to the office for follow up of lung cancer. She is accompanied with her daughter today. She states that she has a lot of pain in both of her feet. She associates this with neuropathy. She had a CT scan this morning of the CAP. Daughter states that insurance wants to see results from the CT scan before continuing with another cycle. She is currently doing Keytruda and Xgeva , she recently had a cycle on 01/10/2020. She states she doesn't have many side effects, but the main one is nausea. She has not  been taking Vitamin D and calcium, but is taking the levothyroxine.      She has been losing weight.  She has a rash on her bilateral lower extremities and was treated for cellulitis.  Symptoms have improved somewhat but she continues to have erythema on her feet and her lower legs.        Past Medical History:   Diagnosis Date   • Anemia of renal disease 6/27/2019   • Arthritis    • Cancer (CMS/HCC)    • Chronic back pain    • Chronic kidney disease (CKD)    • Depression    • Elevated cholesterol    • Gastroesophageal reflux disease 9/24/2019   • Hypertension    • Migraine    • Polypharmacy 6/27/2019   • Protein-calorie malnutrition, moderate (CMS/HCC) 6/27/2019   • Restless leg syndrome    • Stroke (CMS/[facility])        Past Surgical History:   Procedure Laterality Date   • APPENDECTOMY     • CHOLECYSTECTOMY     • HYSTERECTOMY     • JOINT REPLACEMENT     • OOPHORECTOMY     • SHOULDER SURGERY Left          Current Outpatient Medications:   •  Biotin 5000 MCG capsule, Take  by mouth Daily., Disp: , Rfl:   •  butalbital-acetaminophen-caffeine (ESGIC) -40 MG per tablet, Take 1 tablet by mouth 2 (Two) Times a Day As Needed for Headache., Disp: 60 tablet, Rfl: 0  •  calcitriol (ROCALTROL) 0.25 MCG capsule, Take 1 capsule by mouth Daily., Disp: 30 capsule, Rfl: 5  •  celecoxib (CELEBREX) 200 MG capsule, Celebrex 200 mg capsule, Disp: , Rfl:   •  Cranberry-Cholecalciferol 4200-500 MG-UNIT capsule, Take  by mouth Daily., Disp: , Rfl:   •  cyclobenzaprine (FLEXERIL) 10 MG tablet, TAKE ONE TABLET BY MOUTH TWICE DAILY, Disp: 60 tablet, Rfl: 1  •  diphenhydrAMINE (BENADRYL) 12.5 MG/5ML elixir, Take  by mouth 4 (Four) Times a Day As Needed for Itching., Disp: , Rfl:   •  diphenoxylate-atropine (LOMOTIL) 2.5-0.025 MG per tablet, diphenoxylate-atropine 2.5 mg-0.025 mg tablet  take 2 tablets by mouth immediately then 1 tablet by mouth AFTER ...  (REFER TO PRESCRIPTION NOTES)., Disp: , Rfl:   •  docusate sodium (COLACE) 100 MG  capsule, Take 100 mg by mouth Daily., Disp: , Rfl:   •  DULoxetine (CYMBALTA) 60 MG capsule, Take 1 capsule by mouth Daily., Disp: 30 capsule, Rfl: 5  •  EPINEPHrine (EPIPEN) 0.3 MG/0.3ML solution auto-injector injection, Daily., Disp: , Rfl:   •  esomeprazole (nexIUM) 40 MG capsule, Nexium 40 mg capsule,delayed release, Disp: , Rfl:   •  fentaNYL (DURAGESIC) 50 MCG/HR patch, Place 1 patch on the skin as directed by provider Every 72 (Seventy-Two) Hours., Disp: 10 patch, Rfl: 0  •  ferrous sulfate 325 (65 FE) MG tablet, Take 1 tablet by mouth Daily With Breakfast., Disp: 30 tablet, Rfl: 5  •  fesoterodine fumarate (TOVIAZ) 8 MG tablet sustained-release 24 hour tablet, Toviaz 8 mg tablet,extended release, Disp: , Rfl:   •  fexofenadine (ALLEGRA) 180 MG tablet, Take 180 mg by mouth Daily., Disp: , Rfl:   •  fluconazole (DIFLUCAN) 200 MG tablet, fluconazole 200 mg tablet, Disp: , Rfl:   •  fluticasone (FLONASE) 50 MCG/ACT nasal spray, 2 sprays into the nostril(s) as directed by provider Daily., Disp: 1 bottle, Rfl: 5  •  furosemide (LASIX) 20 MG tablet, Take 20 mg by mouth Daily., Disp: , Rfl: 3  •  hydrALAZINE (APRESOLINE) 25 MG tablet, Take 1 tablet by mouth 3 (Three) Times a Day., Disp: 90 tablet, Rfl: 5  •  levothyroxine (SYNTHROID, LEVOTHROID) 50 MCG tablet, Take 1 tablet by mouth Daily., Disp: 30 tablet, Rfl: 1  •  lidocaine (XYLOCAINE,URO-JET) 2 % gel, apply TO mucosal membrane THREE TIMES DAILY AS NEEDED AS directed, Disp: , Rfl: 0  •  lisinopril (PRINIVIL,ZESTRIL) 20 MG tablet, lisinopril 20 mg tablet, Disp: , Rfl:   •  LORazepam (ATIVAN) 2 MG tablet, lorazepam 2 mg tablet, Disp: , Rfl:   •  lubiprostone (AMITIZA) 24 MCG capsule, Amitiza 24 mcg capsule, Disp: , Rfl:   •  meclizine 25 MG chewable tablet chewable tablet, Chew 25 mg 2 (Two) Times a Day As Needed., Disp: , Rfl:   •  melatonin 5 MG tablet tablet, Take 5 mg by mouth At Night As Needed., Disp: , Rfl:   •  metoclopramide (REGLAN) 10 MG tablet,  metoclopramide 10 mg tablet, Disp: , Rfl:   •  Milnacipran HCl (SAVELLA) 100 MG tablet, Savella 100 mg tablet, Disp: , Rfl:   •  montelukast (SINGULAIR) 10 MG tablet, montelukast 10 mg tablet, Disp: , Rfl:   •  Naloxegol Oxalate (MOVANTIK) 25 MG tablet, Take 25 mg by mouth Every Morning., Disp: , Rfl:   •  NAMZARIC 28-10 MG capsule sustained-release 24 hr, Take 1 capsule by mouth Daily., Disp: , Rfl: 3  •  nitrofurantoin, macrocrystal-monohydrate, (MACROBID) 100 MG capsule, Take 1 capsule by mouth 2 (Two) Times a Day., Disp: 60 capsule, Rfl: 5  •  omeprazole (priLOSEC) 40 MG capsule, Take 40 mg by mouth 2 (Two) Times a Day., Disp: , Rfl:   •  oxybutynin (OXYTROL) 3.9 MG/24HR, Oxytrol 3.9 mg/24 hr transdermal patch, Disp: , Rfl:   •  oxybutynin XL (DITROPAN-XL) 10 MG 24 hr tablet, Take 1 tablet by mouth Daily., Disp: 30 tablet, Rfl: 5  •  polyethylene glycol (MIRALAX) packet, Take 17 g by mouth As Needed., Disp: , Rfl:   •  potassium chloride (K-DUR) 10 MEQ CR tablet, Take 10 mEq by mouth 2 (Two) Times a Day., Disp: , Rfl: 2  •  predniSONE (DELTASONE) 5 MG tablet, Take 5 mg by mouth Daily., Disp: , Rfl:   •  RESTASIS 0.05 % ophthalmic emulsion, 2 (Two) Times a Day., Disp: , Rfl: 6  •  rOPINIRole (REQUIP) 1 MG tablet, Take 1 tablet by mouth Every Night. Take 1 hour before bedtime., Disp: 30 tablet, Rfl: 5  •  sertraline (ZOLOFT) 100 MG tablet, Take 100 mg by mouth Daily., Disp: , Rfl:   •  SUMAtriptan (IMITREX) 25 MG tablet, Take 1 tablet by mouth As Needed for Migraine. 1 tab po at onset of HA. If no relief in two hours, may repeat x 1., Disp: 30 tablet, Rfl: 1  •  tiZANidine (ZANAFLEX) 4 MG tablet, tizanidine 4 mg tablet, Disp: , Rfl:   •  tolterodine LA (DETROL LA) 4 MG 24 hr capsule, tolterodine ER 4 mg capsule,extended release 24 hr  TAKE 1 CAPSULE BY MOUTH ONCE DAILY  generic Detral LA, Disp: , Rfl:     Allergies   Allergen Reactions   • Inderal  [Propranolol] Unknown (See Comments)   •  L-Methylfolate-Algae-B12-B6 Unknown (See Comments)   • Morphine Nausea And Vomiting   • Rofecoxib Other (See Comments)   • Topiramate Other (See Comments)       Family History   Problem Relation Age of Onset   • Breast cancer Maternal Aunt    • Leukemia Maternal Aunt        Cancer-related family history includes Breast cancer in her maternal aunt.    Social History     Tobacco Use   • Smoking status: Former Smoker     Packs/day: 1.50     Years: 30.00     Pack years: 45.00     Types: Cigarettes     Last attempt to quit: 2014     Years since quittin.5   • Smokeless tobacco: Never Used   Substance Use Topics   • Alcohol use: No     Frequency: Never   • Drug use: No         I have reviewed the history of present illness, past medical history, family history, social history, lab results, all notes and other records since the patient was last seen on  Visit date not found.    ROS:     Review of Systems   Constitutional: Positive for fatigue and unexpected weight change (have lost #20 pounds in the past year). Negative for appetite change, chills and fever.   HENT: Negative for ear pain, mouth sores, nosebleeds and sore throat.    Eyes: Negative for photophobia and visual disturbance.   Respiratory: Negative for shortness of breath, wheezing and stridor.    Cardiovascular: Negative for chest pain and palpitations.   Gastrointestinal: Positive for nausea (sporadic). Negative for abdominal pain, diarrhea and vomiting.   Endocrine: Negative for cold intolerance and heat intolerance.   Genitourinary: Negative for dysuria and hematuria.   Musculoskeletal: Positive for back pain, gait problem and myalgias. Negative for joint swelling and neck stiffness.        Bilateral foot pain associated with neuropathy     Skin: Negative for color change and rash.   Neurological: Positive for headaches. Negative for seizures and syncope.   Hematological: Negative for adenopathy.        No obvious bleeding   Psychiatric/Behavioral:  Negative for agitation, confusion, hallucinations and sleep disturbance.       Objective:    There were no vitals filed for this visit.  ECOG  (1) Restricted in physically strenuous activity, ambulatory and able to do work of light nature    Physical Exam:   Physical Exam   Constitutional: She is oriented to person, place, and time. She appears well-developed and well-nourished. No distress.   Somewhat thin appearing elderly female   HENT:   Head: Normocephalic and atraumatic.   Eyes: Conjunctivae and EOM are normal. Right eye exhibits no discharge. Left eye exhibits no discharge. No scleral icterus.   Neck: Normal range of motion. Neck supple. No thyromegaly present.   Cardiovascular: Normal rate, regular rhythm and normal heart sounds. Exam reveals no gallop and no friction rub.   Pulmonary/Chest: Effort normal. No respiratory distress. She has no wheezes.   Decreased air entry bilaterally in the lungs.   Abdominal: Soft. Bowel sounds are normal. She exhibits no mass. There is no tenderness. There is no rebound and no guarding.   Musculoskeletal: Normal range of motion. She exhibits no tenderness.   Lymphadenopathy:     She has no cervical adenopathy.   Neurological: She is alert and oriented to person, place, and time. She exhibits normal muscle tone.   Skin: Skin is warm. No rash noted. She is not diaphoretic. No erythema.   Psychiatric: She has a normal mood and affect. Her behavior is normal.   Nursing note and vitals reviewed.            Lab Results - Last 18 Months   Lab Units 12/13/19  1209 11/22/19  1237 10/30/19  1149   WBC 10*3/mm3 6.69 7.35 6.77   HEMOGLOBIN g/dL 10.6* 10.4* 10.0*   HEMATOCRIT % 33.8* 32.1* 31.5*   PLATELETS 10*3/mm3 284 381 360   MCV fL 98.3* 98.2* 99.7*     Lab Results - Last 18 Months   Lab Units 12/13/19  1209 11/22/19  1237 10/30/19  1149   SODIUM mmol/L 136 134* 137   POTASSIUM mmol/L 4.6 5.0 5.0   CHLORIDE mmol/L 100 99 101   CO2 mmol/L 22.0 24.0 23.0   BUN mg/dL 23 31* 26*    CREATININE mg/dL 1.10* 1.27* 1.33*   CALCIUM mg/dL 9.1 9.6 10.0   BILIRUBIN mg/dL 0.3 <0.2* 0.2   ALK PHOS U/L 103 119* 109   ALT (SGPT) U/L 61* 33 11   AST (SGOT) U/L 65* 41* 24   GLUCOSE mg/dL 148* 112* 97       Lab Results   Component Value Date    GLUCOSE 148 (H) 12/13/2019    BUN 23 12/13/2019    CREATININE 1.10 (H) 12/13/2019    EGFRIFNONA 48 (L) 12/13/2019    BCR 20.9 12/13/2019    K 4.6 12/13/2019    CO2 22.0 12/13/2019    CALCIUM 9.1 12/13/2019    ALBUMIN 4.10 12/13/2019    AST 65 (H) 12/13/2019    ALT 61 (H) 12/13/2019       Lab Results - Last 18 Months   Lab Units 09/12/19  0755   INR  0.96   APTT seconds 20.5*       Lab Results   Component Value Date    IRON 45 08/30/2019    TIBC 277 08/30/2019    FERRITIN 295.00 08/30/2019       Lab Results   Component Value Date    FOLATE 9.60 08/30/2019       No results found for: OCCULTBLD    No results found for: RETICCTPCT    Lab Results   Component Value Date    LRAXIXVU34 889 08/30/2019     No results found for: SPEP, UPEP  No results found for: LDH, URICACID  No results found for: JOSAFAT, RF, SEDRATE  No results found for: FIBRINOGEN, HAPTOGLOBIN  Lab Results   Component Value Date    PTT 20.5 (L) 09/12/2019    INR 0.96 09/12/2019     No results found for:   No results found for: CEA  No components found for: CA-19-9  No results found for: PSA          Assessment/Plan     There are no diagnoses linked to this encounter.     Assessment;  1. Metastatic lung cancer: Stage IV .she presented with a left lower lobe lung mass.  PET scan shows liver metastases and bone metastases.  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  Adenosquamous carcinoma tend to be more aggressive than AdenoCa or squamous carcinomas.  Next generation sequencing testing was performed and it failed to show any targetable mutations.  IHC shows PDL 1 score of 60%.  Patient has been started on Keytruda and has received 4 cycles so far.  However repeat CT scan on 1/13/2020 shows  stable disease to slight progression.  In addition patient has also been losing weight and appearing cachectic.  Also has erythematous rash involving her both the lower extremities which could be a reaction to immunotherapy.  2. Lung cancer PDL 1 score 60%   3. Chronic lymphocytic leukemia:  The patient does not have any B symptoms or lymph node swelling.  She is EMERSON Stage 0.  Her bone marrow biopsy previously showed nodular involvement by CLL with nodular deposits occupying 5-10% of marrow cellularity.  FISH panel was negative.  Cytogenetics showed normal karyotype.  Overall, patient did not have any poor prognostic features.  Recent CBC shows a stable white count.  Platelet count is also normal and physical exam does not reveal any lymphadenopathy and she does not have any B symptoms.    4. Bone metastasis: Patient receiving Xgeva.  5. Enhancing lesion MRI brain: Only 4.4 mm.  It does not appear suspicious for metastasis.  6. History of hypothyroidism: Was not on any medications.  Re-started levothyroxine on 11/12/2019  7. Anemia of chronic kidney disease:  Her hemoglobin remains above 10.  She also had iron deficiency and is on iron supplements.   Iron stores are adequate.  She has been taking two iron tablets daily.    8. History of hypercalcemia: PET scan now shows bone metastasis.  PTH is in the high normal range.  Patient has CKD.  .  Vitamin D level is normal.  Will start patient on Xgeva.    9. History of mild iron deficiency.  She is on iron supplement.    10. Hx of Skin lesions:  She is seeing a dermatologist.  She had a skin biopsy.  She also reports having skin cancer excised on her right lower extremity with Dr. Buckley.    11. History of dizziness:  She had a thorough work up.  She saw cardiology.  She also saw an ophthalmologist.  She had a brain MRI which was negative.  12. History of headaches:  She has history of migraines.      PLAN:      1. Patient is losing weight appears cachectic and CT scan  does not show much improvement.  In addition she is also having dermatitis involving the lower extremities.  I am concerned this could be immunotherapy related.  2. Discussed with patient about switching chemotherapy treatment to carboplatin and Taxol for improved response.  Will use low-dose weekly carboplatin Taxol as it may be more tolerable in her.  Will hold off immunotherapy for a few weeks, to ensure the rash improves.  3. Continue Xgeva for bone metastases.  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  5. Follow-up in 4  weeks           Thank you very much for providing the opportunity to participate in this patient’s care. Please do not hesitate to call if there are any other questions.    I have reviewed all relevant labs results, imaging,Outside reports,notes, vitals, medications and plan with the patient today.    Portions of the note have been Scribed by medical assistant/Nurse.  I have reviewed and made changes accordingly.

## 2020-01-13 ENCOUNTER — OFFICE VISIT (OUTPATIENT)
Dept: ONCOLOGY | Facility: CLINIC | Age: 80
End: 2020-01-13

## 2020-01-13 ENCOUNTER — OFFICE VISIT (OUTPATIENT)
Dept: LAB | Facility: HOSPITAL | Age: 80
End: 2020-01-13

## 2020-01-13 ENCOUNTER — HOSPITAL ENCOUNTER (OUTPATIENT)
Dept: PET IMAGING | Facility: HOSPITAL | Age: 80
Discharge: HOME | End: 2020-01-13
Admitting: NURSE PRACTITIONER

## 2020-01-13 VITALS
BODY MASS INDEX: 17.33 KG/M2 | DIASTOLIC BLOOD PRESSURE: 74 MMHG | RESPIRATION RATE: 17 BRPM | WEIGHT: 97.8 LBS | SYSTOLIC BLOOD PRESSURE: 167 MMHG | HEART RATE: 79 BPM | HEIGHT: 63 IN | TEMPERATURE: 98.7 F

## 2020-01-13 DIAGNOSIS — C34.32 MALIGNANT NEOPLASM OF LOWER LOBE OF LEFT LUNG (HCC): ICD-10-CM

## 2020-01-13 DIAGNOSIS — C78.7 LIVER METASTASIS: ICD-10-CM

## 2020-01-13 DIAGNOSIS — C34.32 PRIMARY CANCER OF LEFT LOWER LOBE OF LUNG (HCC): ICD-10-CM

## 2020-01-13 DIAGNOSIS — C91.10 CLL (CHRONIC LYMPHOCYTIC LEUKEMIA) (HCC): Primary | ICD-10-CM

## 2020-01-13 DIAGNOSIS — C79.51 METASTASIS TO BONE (HCC): ICD-10-CM

## 2020-01-13 DIAGNOSIS — C34.32 PRIMARY CANCER OF LEFT LOWER LOBE OF LUNG (HCC): Primary | ICD-10-CM

## 2020-01-13 DIAGNOSIS — D72.829 LEUKOCYTOSIS, UNSPECIFIED TYPE: ICD-10-CM

## 2020-01-13 LAB
BASOPHILS # BLD AUTO: 0.12 10*3/MM3 (ref 0–0.2)
BASOPHILS NFR BLD AUTO: 1.3 % (ref 0–1.5)
DEPRECATED RDW RBC AUTO: 43.4 FL (ref 37–54)
EOSINOPHIL # BLD AUTO: 0.41 10*3/MM3 (ref 0–0.4)
EOSINOPHIL NFR BLD AUTO: 4.3 % (ref 0.3–6.2)
ERYTHROCYTE [DISTWIDTH] IN BLOOD BY AUTOMATED COUNT: 12.7 % (ref 12.3–15.4)
HCT VFR BLD AUTO: 31.4 % (ref 34–46.6)
HGB BLD-MCNC: 10.1 G/DL (ref 12–15.9)
LYMPHOCYTES # BLD AUTO: 2.14 10*3/MM3 (ref 0.7–3.1)
LYMPHOCYTES NFR BLD AUTO: 22.4 % (ref 19.6–45.3)
MCH RBC QN AUTO: 31.2 PG (ref 26.6–33)
MCHC RBC AUTO-ENTMCNC: 32.2 G/DL (ref 31.5–35.7)
MCV RBC AUTO: 96.9 FL (ref 79–97)
MONOCYTES # BLD AUTO: 1.27 10*3/MM3 (ref 0.1–0.9)
MONOCYTES NFR BLD AUTO: 13.3 % (ref 5–12)
NEUTROPHILS # BLD AUTO: 5.62 10*3/MM3 (ref 1.7–7)
NEUTROPHILS NFR BLD AUTO: 58.7 % (ref 42.7–76)
PLATELET # BLD AUTO: 326 10*3/MM3 (ref 140–450)
PMV BLD AUTO: 8.4 FL (ref 6–12)
RBC # BLD AUTO: 3.24 10*6/MM3 (ref 3.77–5.28)
WBC NRBC COR # BLD: 9.56 10*3/MM3 (ref 3.4–10.8)

## 2020-01-13 PROCEDURE — 36415 COLL VENOUS BLD VENIPUNCTURE: CPT

## 2020-01-13 PROCEDURE — 71250 CT THORAX DX C-: CPT

## 2020-01-13 PROCEDURE — 99215 OFFICE O/P EST HI 40 MIN: CPT | Performed by: INTERNAL MEDICINE

## 2020-01-13 PROCEDURE — 85025 COMPLETE CBC W/AUTO DIFF WBC: CPT

## 2020-01-13 PROCEDURE — 74176 CT ABD & PELVIS W/O CONTRAST: CPT

## 2020-01-14 ENCOUNTER — OFFICE VISIT (OUTPATIENT)
Dept: FAMILY MEDICINE CLINIC | Facility: CLINIC | Age: 80
End: 2020-01-14

## 2020-01-14 VITALS
WEIGHT: 96 LBS | SYSTOLIC BLOOD PRESSURE: 152 MMHG | BODY MASS INDEX: 17.01 KG/M2 | TEMPERATURE: 98.3 F | OXYGEN SATURATION: 100 % | DIASTOLIC BLOOD PRESSURE: 56 MMHG | RESPIRATION RATE: 18 BRPM | HEIGHT: 63 IN | HEART RATE: 76 BPM

## 2020-01-14 DIAGNOSIS — C79.51 METASTASIS TO BONE (HCC): ICD-10-CM

## 2020-01-14 DIAGNOSIS — C34.32 PRIMARY CANCER OF LEFT LOWER LOBE OF LUNG (HCC): Primary | ICD-10-CM

## 2020-01-14 DIAGNOSIS — M51.36 DEGENERATION OF LUMBAR INTERVERTEBRAL DISC: ICD-10-CM

## 2020-01-14 DIAGNOSIS — C78.7 LIVER METASTASIS: ICD-10-CM

## 2020-01-14 DIAGNOSIS — G89.3 CHRONIC PAIN DUE TO NEOPLASM: ICD-10-CM

## 2020-01-14 PROCEDURE — 99214 OFFICE O/P EST MOD 30 MIN: CPT | Performed by: FAMILY MEDICINE

## 2020-01-14 RX ORDER — LORAZEPAM 0.5 MG/1
0.5 TABLET ORAL 2 TIMES DAILY
COMMUNITY

## 2020-01-14 RX ORDER — DULOXETIN HYDROCHLORIDE 60 MG/1
60 CAPSULE, DELAYED RELEASE ORAL DAILY
Qty: 90 CAPSULE | Refills: 3 | Status: SHIPPED | OUTPATIENT
Start: 2020-01-14 | End: 2020-03-20 | Stop reason: SDUPTHER

## 2020-01-14 RX ORDER — TIZANIDINE 4 MG/1
4 TABLET ORAL EVERY 6 HOURS PRN
Qty: 120 TABLET | Refills: 3 | Status: SHIPPED | OUTPATIENT
Start: 2020-01-14 | End: 2020-03-20 | Stop reason: SDUPTHER

## 2020-01-14 RX ORDER — DOCUSATE SODIUM 100 MG/1
100 CAPSULE, LIQUID FILLED ORAL DAILY
Qty: 90 CAPSULE | Refills: 3 | Status: SHIPPED | OUTPATIENT
Start: 2020-01-14 | End: 2020-03-20 | Stop reason: SDUPTHER

## 2020-01-14 RX ORDER — PREDNISONE 1 MG/1
5 TABLET ORAL DAILY
Qty: 90 TABLET | Refills: 3 | Status: SHIPPED | OUTPATIENT
Start: 2020-01-14 | End: 2020-03-20 | Stop reason: SDUPTHER

## 2020-01-14 RX ORDER — SERTRALINE HYDROCHLORIDE 100 MG/1
100 TABLET, FILM COATED ORAL DAILY
Qty: 90 TABLET | Refills: 3 | Status: SHIPPED | OUTPATIENT
Start: 2020-01-14 | End: 2020-03-20 | Stop reason: SDUPTHER

## 2020-01-14 RX ORDER — MEMANTINE HYDROCHLORIDE AND DONEPEZIL HYDROCHLORIDE 28; 10 MG/1; MG/1
1 CAPSULE ORAL DAILY
Qty: 90 CAPSULE | Refills: 3 | Status: SHIPPED | OUTPATIENT
Start: 2020-01-14 | End: 2020-03-20 | Stop reason: SDUPTHER

## 2020-01-14 RX ORDER — NITROFURANTOIN MACROCRYSTALS 100 MG/1
100 CAPSULE ORAL 2 TIMES DAILY
COMMUNITY
End: 2020-03-20 | Stop reason: SDUPTHER

## 2020-01-14 RX ORDER — FENTANYL 50 UG/H
1 PATCH TRANSDERMAL
Qty: 10 PATCH | Refills: 0 | Status: SHIPPED | OUTPATIENT
Start: 2020-01-14 | End: 2020-02-11 | Stop reason: SDUPTHER

## 2020-01-14 NOTE — PROGRESS NOTES
Subjective   Ann Andre is a 79 y.o. female.   Chief Complaint   Patient presents with   • Pain     Low back pain especially on (R) side; paperwork along with INSPECT completed.        History of Present Illness   Presents today for routine follow-up on chronic pain.    Needs refills on prednisone, Colace, Namzaric, Zoloft, Cymbalta.  She is being followed by [facility] and they have sent a 5 page medication list for me to verify.  Her daughter brought all of her medications which, when all the bottles were stood up, took up an entire Anthony stand and half of a chair.  I went through her entire medication list one by one and verified with her that she took it, and for what expected reason.  I updated the list for [facility], signed it and returned to  her daughter to get back to them.    Carmen saw her on December 26 for a swollen red leg.  She gave her a course of doxycycline.  Dr. Batista thought it was a possible skin reaction to the Keytruda chemotherapy she is on.  He is giving her a holiday from that and adjusting her chemotherapy.  Apparently she had CT scans done yesterday which were concerning for poor response to her chemotherapy.  That is another reason that they are going to try changing it.    Her pain remains fairly well controlled on the fentanyl patch.  We are still trying to make arrangements for her to go to pain clinic for consideration of injections in her back.  Inspect, COMM, patient pain inventory are all reviewed.  She is having no side effects of her medicine such as confusion or delirium.  No change in bowel habits.    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HCC) 09/24/2019   • Chronic  constipation 09/24/2019   • Chronic pain 09/24/2019   • Chronic low back pain 09/24/2019   • Degeneration of lumbar intervertebral disc 09/24/2019   • Dementia (CMS/HCC) 09/24/2019   • Depressive disorder 09/24/2019   • Dysphagia 09/24/2019   • Fatigue 09/24/2019   • Gastroesophageal reflux disease 09/24/2019   • Gastro-esophageal reflux disease with esophagitis 09/24/2019   • Generalized osteoarthritis 09/24/2019   • Generalized headache 09/24/2019   • Hypothyroidism 09/24/2019   • Insomnia 09/24/2019   • Leukocytosis 09/24/2019   • Nausea 09/24/2019   • Malignant tumor of urinary bladder (CMS/HCC) 09/24/2019   • Neuropathy 09/24/2019   • Overactive bladder 09/24/2019   • Senile osteopenia 09/24/2019   • Stress incontinence of urine 09/24/2019   • Urethral stenosis 09/24/2019   • Urinary incontinence 09/24/2019   • Vomiting 09/24/2019   • Chronic lymphocytic leukemia (CMS/HCC) 09/24/2019   • Hypertensive disorder 09/24/2019   • Liver metastasis (CMS/HCC) 09/24/2019   • Lymphadenopathy 09/24/2019   • Metastasis to bone (CMS/HCC) 09/24/2019   • Primary cancer of left lower lobe of lung (CMS/HCC) 08/30/2019     Note Last Updated: 11/11/2019     mets to bone and liver on PET scan     • CLL (chronic lymphocytic leukemia) (CMS/HCC) 08/29/2019     Note Last Updated: 11/11/2019     Followed by Dr. Batista     • Anemia of chronic kidney failure, unspecified stage 08/29/2019   • Hypercalcemia 08/29/2019   • Iron deficiency 08/29/2019   • Chronic headaches 08/29/2019   • Dizziness 08/29/2019   • Fall 06/27/2019   • Chronic kidney disease (CKD) 06/27/2019   • Anemia of renal disease 06/27/2019   • Hypertension 06/27/2019   • Polypharmacy 06/27/2019   • Protein-calorie malnutrition, moderate (CMS/HCC) 06/27/2019   • Syncope 02/04/2019   • Shortness of breath 06/12/2017   • Cardiac murmur 06/12/2017   • Migraine 01/27/2017           Past Surgical History:   Procedure Laterality Date   • APPENDECTOMY     • CHOLECYSTECTOMY     •  HYSTERECTOMY     • JOINT REPLACEMENT     • OOPHORECTOMY     • SHOULDER SURGERY Left      Current Outpatient Medications on File Prior to Visit   Medication Sig   • Biotin 5000 MCG capsule Take  by mouth Daily.   • butalbital-acetaminophen-caffeine (ESGIC) -40 MG per tablet Take 1 tablet by mouth 2 (Two) Times a Day As Needed for Headache.   • calcitriol (ROCALTROL) 0.25 MCG capsule Take 1 capsule by mouth Daily.   • Cranberry-Cholecalciferol 4200-500 MG-UNIT capsule Take  by mouth Daily.   • diphenoxylate-atropine (LOMOTIL) 2.5-0.025 MG per tablet diphenoxylate-atropine 2.5 mg-0.025 mg tablet   take 2 tablets by mouth immediately then 1 tablet by mouth AFTER ...  (REFER TO PRESCRIPTION NOTES).   • EPINEPHrine (EPIPEN) 0.3 MG/0.3ML solution auto-injector injection Daily.   • ferrous sulfate 325 (65 FE) MG tablet Take 1 tablet by mouth Daily With Breakfast.   • fexofenadine (ALLEGRA) 180 MG tablet Take 180 mg by mouth Daily.   • fluticasone (FLONASE) 50 MCG/ACT nasal spray 2 sprays into the nostril(s) as directed by provider Daily.   • furosemide (LASIX) 20 MG tablet Take 40 mg by mouth Daily.   • hydrALAZINE (APRESOLINE) 25 MG tablet Take 1 tablet by mouth 3 (Three) Times a Day.   • levothyroxine (SYNTHROID, LEVOTHROID) 50 MCG tablet Take 1 tablet by mouth Daily.   • lidocaine (XYLOCAINE,URO-JET) 2 % gel apply TO mucosal membrane THREE TIMES DAILY AS NEEDED AS directed   • LORazepam (ATIVAN) 0.5 MG tablet Take 0.5 mg by mouth 2 (Two) Times a Day.   • melatonin 5 MG tablet tablet Take 5 mg by mouth At Night As Needed.   • Naloxegol Oxalate (MOVANTIK) 25 MG tablet Take 25 mg by mouth Every Morning.   • nitrofurantoin (MACRODANTIN) 100 MG capsule Take 100 mg by mouth 2 (Two) Times a Day.   • oxybutynin XL (DITROPAN-XL) 10 MG 24 hr tablet Take 1 tablet by mouth Daily.   • potassium chloride (K-DUR) 10 MEQ CR tablet Take 10 mEq by mouth 2 (Two) Times a Day.   • RESTASIS 0.05 % ophthalmic emulsion 2 (Two) Times a  Day.   • rOPINIRole (REQUIP) 1 MG tablet Take 1 tablet by mouth Every Night. Take 1 hour before bedtime.   • [DISCONTINUED] celecoxib (CELEBREX) 200 MG capsule Celebrex 200 mg capsule   • [DISCONTINUED] cyclobenzaprine (FLEXERIL) 10 MG tablet TAKE ONE TABLET BY MOUTH TWICE DAILY   • [DISCONTINUED] diphenhydrAMINE (BENADRYL) 12.5 MG/5ML elixir Take  by mouth 4 (Four) Times a Day As Needed for Itching.   • [DISCONTINUED] docusate sodium (COLACE) 100 MG capsule Take 100 mg by mouth Daily.   • [DISCONTINUED] DULoxetine (CYMBALTA) 60 MG capsule Take 1 capsule by mouth Daily.   • [DISCONTINUED] esomeprazole (nexIUM) 40 MG capsule Nexium 40 mg capsule,delayed release   • [DISCONTINUED] fentaNYL (DURAGESIC) 50 MCG/HR patch Place 1 patch on the skin as directed by provider Every 72 (Seventy-Two) Hours.   • [DISCONTINUED] fesoterodine fumarate (TOVIAZ) 8 MG tablet sustained-release 24 hour tablet Toviaz 8 mg tablet,extended release   • [DISCONTINUED] lisinopril (PRINIVIL,ZESTRIL) 20 MG tablet lisinopril 20 mg tablet   • [DISCONTINUED] LORazepam (ATIVAN) 2 MG tablet lorazepam 2 mg tablet   • [DISCONTINUED] lubiprostone (AMITIZA) 24 MCG capsule Amitiza 24 mcg capsule   • [DISCONTINUED] meclizine 25 MG chewable tablet chewable tablet Chew 25 mg 2 (Two) Times a Day As Needed.   • [DISCONTINUED] metoclopramide (REGLAN) 10 MG tablet metoclopramide 10 mg tablet   • [DISCONTINUED] Milnacipran HCl (SAVELLA) 100 MG tablet Savella 100 mg tablet   • [DISCONTINUED] montelukast (SINGULAIR) 10 MG tablet montelukast 10 mg tablet   • [DISCONTINUED] mupirocin (BACTROBAN) 2 % ointment Apply  topically to the appropriate area as directed See Admin Instructions. Apply to the affected areas twice daily   • [DISCONTINUED] NAMZARIC 28-10 MG capsule sustained-release 24 hr Take 1 capsule by mouth Daily.   • [DISCONTINUED] omeprazole (priLOSEC) 40 MG capsule Take 40 mg by mouth 2 (Two) Times a Day.   • [DISCONTINUED] polyethylene glycol (MIRALAX)  packet Take 17 g by mouth As Needed.   • [DISCONTINUED] predniSONE (DELTASONE) 5 MG tablet Take 5 mg by mouth Daily.   • [DISCONTINUED] sertraline (ZOLOFT) 100 MG tablet Take 100 mg by mouth Daily.   • [DISCONTINUED] SUMAtriptan (IMITREX) 25 MG tablet Take 1 tablet by mouth As Needed for Migraine. 1 tab po at onset of HA. If no relief in two hours, may repeat x 1.   • [DISCONTINUED] tiZANidine (ZANAFLEX) 4 MG tablet tizanidine 4 mg tablet   • [DISCONTINUED] tolterodine LA (DETROL LA) 4 MG 24 hr capsule tolterodine ER 4 mg capsule,extended release 24 hr   TAKE 1 CAPSULE BY MOUTH ONCE DAILY   generic Detral LA     No current facility-administered medications on file prior to visit.      Allergies   Allergen Reactions   • Inderal  [Propranolol] Unknown (See Comments)   • L-Methylfolate-Algae-B12-B6 Unknown (See Comments)   • Morphine Nausea And Vomiting   • Rofecoxib Other (See Comments)   • Topiramate Other (See Comments)     Social History     Socioeconomic History   • Marital status:      Spouse name: Not on file   • Number of children: Not on file   • Years of education: Not on file   • Highest education level: Not on file   Tobacco Use   • Smoking status: Former Smoker     Packs/day: 1.50     Years: 30.00     Pack years: 45.00     Types: Cigarettes     Last attempt to quit: 2014     Years since quittin.5   • Smokeless tobacco: Never Used   Substance and Sexual Activity   • Alcohol use: No     Frequency: Never   • Drug use: No   • Sexual activity: Defer   Social History Narrative    She lives alone in [place], is , retired and has three grown children. She used to smoke about 1 1/2 ppd for 30 years and quit in . She reports a history of heavy alcohol use for about 40 years and now drinks socially. She does not use recreational drugs.     Family History   Problem Relation Age of Onset   • Breast cancer Maternal Aunt    • Leukemia Maternal Aunt      The following portions of the patient's  "history were reviewed and updated as appropriate: allergies, current medications, past surgical history and problem list.    Review of Systems   Constitutional: Negative for chills and fever.   Respiratory: Negative for cough and shortness of breath.    Cardiovascular: Negative for chest pain.   Gastrointestinal: Negative for abdominal pain.   Musculoskeletal: Positive for back pain.   Skin: Positive for dry skin and rash.   Neurological: Negative for seizures, light-headedness and headache.       Objective   /56 (BP Location: Left arm, Patient Position: Sitting, Cuff Size: Adult)   Pulse 76   Temp 98.3 °F (36.8 °C) (Oral)   Resp 18   Ht 160 cm (63\")   Wt 43.5 kg (96 lb)   SpO2 100%   BMI 17.01 kg/m²   Physical Exam   Constitutional: She is oriented to person, place, and time. She appears well-nourished. No distress.   Small statured elderly WF NAD.     HENT:   Head: Normocephalic and atraumatic.   Eyes: Pupils are equal, round, and reactive to light. EOM are normal.   Neck: Neck supple. No JVD present. No thyromegaly present.   Cardiovascular: Normal rate, regular rhythm and normal heart sounds.   No murmur heard.  Pulmonary/Chest: Effort normal and breath sounds normal. No respiratory distress.   Abdominal: Soft. Bowel sounds are normal. She exhibits no distension. There is no tenderness. There is no guarding.   Musculoskeletal:   Muscle wasting in all 4 extremities   Neurological: She is alert and oriented to person, place, and time.   Skin: Skin is warm and dry. No rash noted.   Psychiatric: She has a normal mood and affect. Her behavior is normal.               Assessment/Plan   Diagnoses and all orders for this visit:    1. Primary cancer of left lower lobe of lung (CMS/HCC) (Primary)  -     fentaNYL (DURAGESIC) 50 MCG/HR patch; Place 1 patch on the skin as directed by provider Every 72 (Seventy-Two) Hours.  Dispense: 10 patch; Refill: 0    2. Liver metastasis (CMS/HCC)  -     fentaNYL (DURAGESIC) " 50 MCG/HR patch; Place 1 patch on the skin as directed by provider Every 72 (Seventy-Two) Hours.  Dispense: 10 patch; Refill: 0    3. Chronic pain due to neoplasm    4. Metastasis to bone (CMS/HCC)  -     fentaNYL (DURAGESIC) 50 MCG/HR patch; Place 1 patch on the skin as directed by provider Every 72 (Seventy-Two) Hours.  Dispense: 10 patch; Refill: 0    5. Degeneration of lumbar intervertebral disc  -     Ambulatory Referral to Pain Management    Other orders  -     docusate sodium (COLACE) 100 MG capsule; Take 1 capsule by mouth Daily.  Dispense: 90 capsule; Refill: 3  -     DULoxetine (CYMBALTA) 60 MG capsule; Take 1 capsule by mouth Daily.  Dispense: 90 capsule; Refill: 3  -     NAMZARIC 28-10 MG capsule sustained-release 24 hr; Take 28 mg by mouth Daily.  Dispense: 90 capsule; Refill: 3  -     predniSONE (DELTASONE) 5 MG tablet; Take 1 tablet by mouth Daily.  Dispense: 90 tablet; Refill: 3  -     sertraline (ZOLOFT) 100 MG tablet; Take 1 tablet by mouth Daily.  Dispense: 90 tablet; Refill: 3  -     tiZANidine (ZANAFLEX) 4 MG tablet; Take 1 tablet by mouth Every 6 (Six) Hours As Needed for Muscle Spasms.  Dispense: 120 tablet; Refill: 3    I refilled all the medicines that she needs refilled.  Refill the fentanyl patch today.  Follow-up with Dr. Batista regarding her metastatic lung cancer and probable Keytruda reaction.  I did recommend Lac-Hydrin moisturizer for her complaint of severe dry skin.  Over 1 hour was spent today going through all of her medications as above and correcting the medication list.           Return in about 4 weeks (around 2/11/2020).    Call with any problems or concerns before next visit

## 2020-01-15 ENCOUNTER — TELEPHONE (OUTPATIENT)
Dept: ONCOLOGY | Facility: CLINIC | Age: 80
End: 2020-01-15

## 2020-01-15 DIAGNOSIS — C79.51 METASTASIS TO BONE (HCC): Primary | ICD-10-CM

## 2020-01-15 DIAGNOSIS — M54.9 BACK PAIN, UNSPECIFIED BACK LOCATION, UNSPECIFIED BACK PAIN LATERALITY, UNSPECIFIED CHRONICITY: ICD-10-CM

## 2020-01-15 RX ORDER — OXYCODONE HYDROCHLORIDE 5 MG/1
5 TABLET ORAL EVERY 8 HOURS PRN
Qty: 30 TABLET | Refills: 0 | Status: SHIPPED | OUTPATIENT
Start: 2020-01-15 | End: 2020-02-05

## 2020-01-15 NOTE — TELEPHONE ENCOUNTER
Pt daughter called stating that pt has been in extreme pain. States that the pain is in her back and legs. She is now having to use a walker to move around her home.   Pt currently uses Fentanyl 50mcg patches and Esgic 50/325/40 for pain.   Reviewed with Dr. Batista. Orders for MRI Lumbar spine and Oxycodone 5mg q 8 hours scheduled until pain eases.   Daughter asks that this be called into [facility] in [place].   Alexia BARRIENTOS NP entered the orders.   Daughter was made aware of all the above and v/u.

## 2020-01-22 ENCOUNTER — HOSPITAL ENCOUNTER (OUTPATIENT)
Dept: MRI IMAGING | Facility: HOSPITAL | Age: 80
Discharge: HOME | End: 2020-01-22
Admitting: NURSE PRACTITIONER

## 2020-01-22 ENCOUNTER — HOSPITAL ENCOUNTER (OUTPATIENT)
Dept: CARDIOLOGY | Facility: HOSPITAL | Age: 80
Discharge: HOME | End: 2020-01-22

## 2020-01-22 DIAGNOSIS — R60.0 LOCALIZED EDEMA: ICD-10-CM

## 2020-01-22 DIAGNOSIS — M54.9 BACK PAIN, UNSPECIFIED BACK LOCATION, UNSPECIFIED BACK PAIN LATERALITY, UNSPECIFIED CHRONICITY: ICD-10-CM

## 2020-01-22 LAB

## 2020-01-22 PROCEDURE — 72148 MRI LUMBAR SPINE W/O DYE: CPT

## 2020-01-22 PROCEDURE — 93970 EXTREMITY STUDY: CPT

## 2020-01-28 DIAGNOSIS — M54.9 BACK PAIN, UNSPECIFIED BACK LOCATION, UNSPECIFIED BACK PAIN LATERALITY, UNSPECIFIED CHRONICITY: ICD-10-CM

## 2020-01-28 RX ORDER — OXYCODONE HYDROCHLORIDE 5 MG/1
5 TABLET ORAL EVERY 8 HOURS PRN
Qty: 30 TABLET | Refills: 0 | OUTPATIENT
Start: 2020-01-28

## 2020-01-28 NOTE — TELEPHONE ENCOUNTER
Spoke with Dr. Fletcher who states that this was prescribed by [facility] in [place] (Dr. Batista's office) and that the nurse with [facility] home [facility] needs to contact Dr. Batista's office for a refill on this medication. Called and s/w Bharti (Nurse with [facility]) and advised her to reach out to Dr. Batista's office to get this medication refilled. She voiced understanding and appreciation for this office returning her phone call.

## 2020-01-28 NOTE — TELEPHONE ENCOUNTER
Patient's Nurse, Bharti with [facility] called into office and left a voicemail stating that she was fixing patient's medication caddies for this week and she noticed that patient only has 2-3 days left of her Oxycodone and is needing a refill on it to [facility]. Medication is loaded and ready to send. INSPECT completed and on your desk.    Last OV: 1-  Next OV: 2-  Last Labs: 1-

## 2020-01-28 NOTE — TELEPHONE ENCOUNTER
I will talk to patient and her daughter at scheduled visit on February 11 regarding who is actually doing her pain management.  She has a very complicated cancer diagnosis, which is very likely terminal.  I am happy to continue working with her if they choose, we will just have to clarify who is doing what.  At this point, I do not know if they are intending to transfer pain management to Dr. Batista.

## 2020-01-31 ENCOUNTER — TELEPHONE (OUTPATIENT)
Dept: ONCOLOGY | Facility: CLINIC | Age: 80
End: 2020-01-31

## 2020-01-31 NOTE — TELEPHONE ENCOUNTER
Received call from the patient's daughter returning my call.  Discussed results of MRI.  Patient's daughter v/u.

## 2020-02-03 NOTE — PROGRESS NOTES
HEMATOLOGY ONCOLOGY FOLLOW UP        Patient name: Ann Andre  : 1940  MRN: [id number]  Primary Care Physician: Rochelle Fletcher MD  Referring Physician: Rochelle Fletcher, *  Reason For Consult:     Chief Complaint   Patient presents with   • Follow-up     Metastatic lung cancer: Stage IV   Metastatic lung cancer  Chronic lymphocytic leukemia  Metastatic lung cancer    History of Present Illness:  Ms. Andre is a pleasant 78-year-old female who underwent lab work at her Primary Care Physician’s office on 10/27/14 that showed leukocytosis with a white count of 12.1 and normal hemoglobin and platelet count.  Her absolute lymphocyte count is 8,000 (elevated).  ESR was normal at 6.  The patient was therefore referred for hematology for further evaluation of the lymphocytosis.  She also has chronic kidney disease and her creatinine was 1.8 on 10/29/14.      14 - On initial evaluation in our office, WBC 12.2, hemoglobin 10.8, platelet count 311,000, lymphocyte count is almost 6,000.  The patient denied any fever, chills, night sweats, weight loss or lymph node swelling.  She does report having chronic anemia and also had received B12 injections in the past.  She reports history of mild chronic kidney disease.    • 14 - Creatinine 2.3, BUN 27, iron saturation 22%, TIBC 286, serum iron 64, ferritin 232, B12 566, reticulocyte count 1.3%.   • 14 - Flow cytometry:  Immunophenotyping identifies CD5 positive, CD23 positive, Monoclonal copper B cell population representing 24% of the total events.  CD 38 was negative.  This represents CLL.   • 1/22/15 - Bone marrow biopsy:  Variable cellular bone marrow with involvement by SLL/CLL in a nodular pattern.  Flow cytometry involvement 24% by CLL/SLL.  Nodular pattern of spread is considered a good prognostic finding.   Examination with PAS stain revealed several interstitial nodules which occupy 5% to 10% of the total marrow  cellularity.  Cytogenetics showed normal karyotype.  CLL FISH panel:  Negative.  Specific probes for TP53 on chromosome 17, NABIL on chromosome 11, chromosome 12 and chromosome 13 were negative.  This indicates good prognosis.    • 2/12/15 - WBC 11.1, hemoglobin 10.9, platelet count 360,000, MCV 98.6.   • 7/14/15 - WBC 9.2, hemoglobin 10.1, platelet count 306,000, MCV 94.1.  • 4/21/15 - Iron saturation 21%, TIBC 264,000, serum iron 56, ferritin 199, EPO level 6, reticulocyte count 1.6%, haptoglobin 124.    • 10/20/15 - WBC 10.7, hemoglobin 11.2, platelet count 348,000.  • 2/16/16 - Iron saturation 25%, TIBC 250, serum iron 63.  WBC 7.7, hemoglobin 10.9, platelet count 293,000, MCV 91.3, lymphocytes 3,900.  • 3/14/16 - Creatinine 1.48.  • 8/30/16 - Lymphocyte count 4,370.  Iron saturation 26%, TIBC 262, serum iron 67.    • 9/6/16 - WBC 11.5, hemoglobin 11.6, platelet count 334,000, MCV 93.6.  • 1/2/17 - WBC 7.2, hemoglobin 10.6, platelet count 310,000, MCV 97.2.    • 5/22/17 - WBC 8.9, hemoglobin 10.7, platelet count 294,000, MCV 99.7.  Absolute lymphocyte count 3.66 (H).  Iron 50, TIBC 205, iron saturation 20%.    • 3/27/18 - WBC 10.1, hemoglobin 11, MCV 96.7, platelet count 301,000.  Serum iron 80, iron saturation 31%, TIBC 256.  Creatinine 1.5, BUN 27.    • 9/20/18 - WBC 9, hemoglobin 10, platelet count 284,000, .5.  Absolute lymphocyte count 4250 (H).  Ferritin 351.  Iron 51, TIBC 197.  • 12/28/18 - Creatinine 1.7, BUN 31.  Calcium 10.7 (H), albumin 4.1.  WBC 8.7, hemoglobin 10.3, platelet count 375,000, .6.    • 1/8/19 - TSH 1.74.  Ferritin 379.  Iron saturation 14%, TIBC 261, serum iron 37 (L).  SPEP appears normal.  Serum immunofixation:  No monoclonal band.  PTH intact 63 (normal range 14-64).  Serum calcium 10.3 (N).    • 3/12/19 - WBC 7.6, hemoglobin 11.5, platelet count 312,000, .9.  Creatinine 1.38, BUN 22, calcium 10.9 (H).  Vitamin D level 49 (N).  • 7/22/2019 CT scan chest without  contrast: There is a new mass, multilobulated within the left lower lobe, measures 3.1 x 2.3 cm.  Adjacent scarring is present as well.  Evidence of prior granulomatous disease, no obvious mediastinal or hilar lymphadenopathy..  Aneurysm of the ascending thoracic aorta.  CT scan was done in the context of left rib cage pain after having a fall.  • 7/18/2019 MRI brain: Possible enhancing lesion seen only on coronal image 8 measures 4.4 mm within the right precentral gyrus.  Per the radiologist, this could be an old infarct or metastases or pulsation artifact.  No acute infarct.  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  FDG avid left hilar lymphadenopathy with SUV of 8.2..  Multiple FDG avid hepatic and osseous lesions compatible with the metastasis.  There are approximately 15-20 FDG avid osseous lesions compatible with mets.  Index lesion involving the left posterior eighth rib demonstrates lytic changes with SUV of 12.9.  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 The biopsy is stained via immunohistochemical technique utilizing appropriate controls for the presence of CK7, CK5/6, p63, TTF-1, and napsin A. The tumor cells are strongly positive for all markers tested and indicate both a squamous and an adenocarcinoma component.  • 9/23/2019 WBC 9.3, hemoglobin 10, platelets 306, .3  • 9/24/2019 brain MRI with and without contrast: No evidence of any brain metastasis.  • October 7, 2019 Next generation sequencing testing by [facility]= PDL 1 by IHC 60%.  Testing is negative for Sylvester  BRAF, ROS 1, MET, and NTRK, K-walt, ERBB 2, RET, STK11,TP53.. 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  • 10/30/2019 patient received cycle 1 of Keytruda.  TSH 4.44 high, free T4 1.05 patient received Xgeva  • 10/30/2019 WBC 6.7, hemoglobin 10.0, platelets 360, creatinine 1.33,   • 11/22/2019 patient received Keytruda 200 mg, creatinine 1.27, BUN 31,  • 12/6/2019 patient received Xgeva  • 12/13/2019 patient received Keytruda 200 mg cycle 3.  WBC 6.6, hemoglobin 10.6, platelets 284, creatinine 1.1, AST 65, ALT 61, TSH 1.8, free T4 1.08  • 01/10/2020- Keytruda and Xgeva Cycle 4   • 1/13/2019 CT chest abdomen and pelvis without contrast: Posterior left lower lobe lobulated mass measures larger than on 07/22/2019. It appears slightly different in morphology when compared to the previous study. Disease progression cannot be excluded. However, the measurements may be slightly artifactually increased due to the presence of adjacent atelectasis.. Osteolytic lesions within the left third and eighth ribs, right iliac wing, and questionably within the left iliac wing and right femoral head, consistent with osseous metastatic disease. Additionally, there is thickening and sclerosis of the L4 spinous process and right lamina highly suspicious for metastatic disease  • 1/13/2020 WBC 9.5, hemoglobin 10.1, platelets are 326  • 1/10/2020: Patient received Keytruda.  Patient received Xgeva.  WBC 7.8, hemoglobin 11.1, platelets 290,Creatinine 1.3, BUN 37, creatinine 1.34, AST 89, ALT 85, AST 89, ALT 85       Subjective:02/04/2020   patient presents to the office for follow up of lung cancer. She is accompanied by her daughter. She continues to take iron daily. At last visit, Dr. Batista prescribed her Oxycodone 5mg TID, she states she is needing a refill on this. Her PCP, Dr. Fletcher handles her Fentanyl patches.  She currently undergoes Xgeva and her Keytruda was recently on hold due to a possible allergic reaction. She has redness to bilateral legs. Her most recent treatment was on 01/10/2020. Her daughter states that she is very weak and has not been out of the house in sixteen days. She states that sometimes she tries to do at-home exercises, but she can only do a little without getting tired or fatigued. Denies nausea.    She has a flesh-colored peeling rash involving her entire bilateral lower legs.  Also several flesh-colored small patches noted on other portions of the body.    She has been losing weight.  She has a rash on her bilateral lower extremities and was treated for cellulitis.  Symptoms have improved somewhat but she continues to have erythema on her feet and her lower legs.  She says appetite is good but she is losing weight.        Past Medical History:   Diagnosis Date   • Anemia of renal disease 6/27/2019   • Arthritis    • Cancer (CMS/HCC)    • Chronic back pain    • Chronic kidney disease (CKD)    • Depression    • Elevated cholesterol    • Gastroesophageal reflux disease 9/24/2019   • Hypertension    • Migraine    • Polypharmacy 6/27/2019   • Protein-calorie malnutrition, moderate (CMS/HCC) 6/27/2019   • Restless leg syndrome    • Stroke (CMS/HCC)        Past Surgical History:   Procedure Laterality Date   • APPENDECTOMY     • CHOLECYSTECTOMY     • HYSTERECTOMY     • JOINT REPLACEMENT     • OOPHORECTOMY     • SHOULDER SURGERY Left          Current Outpatient Medications:   •  Biotin 5000 MCG capsule, Take  by mouth Daily., Disp: , Rfl:   •  butalbital-acetaminophen-caffeine (ESGIC) -40 MG per tablet, Take 1 tablet by mouth 2 (Two) Times a Day As Needed for Headache., Disp: 60 tablet, Rfl: 0  •  calcitriol (ROCALTROL) 0.25 MCG capsule, Take 1 capsule by mouth Daily., Disp: 30 capsule, Rfl: 5  •  Cranberry-Cholecalciferol 4200-500 MG-UNIT capsule, Take  by mouth Daily., Disp: , Rfl:   •   diphenoxylate-atropine (LOMOTIL) 2.5-0.025 MG per tablet, diphenoxylate-atropine 2.5 mg-0.025 mg tablet  take 2 tablets by mouth immediately then 1 tablet by mouth AFTER ...  (REFER TO PRESCRIPTION NOTES)., Disp: , Rfl:   •  docusate sodium (COLACE) 100 MG capsule, Take 1 capsule by mouth Daily., Disp: 90 capsule, Rfl: 3  •  DULoxetine (CYMBALTA) 60 MG capsule, Take 1 capsule by mouth Daily., Disp: 90 capsule, Rfl: 3  •  EPINEPHrine (EPIPEN) 0.3 MG/0.3ML solution auto-injector injection, Daily., Disp: , Rfl:   •  fentaNYL (DURAGESIC) 50 MCG/HR patch, Place 1 patch on the skin as directed by provider Every 72 (Seventy-Two) Hours., Disp: 10 patch, Rfl: 0  •  ferrous sulfate 325 (65 FE) MG tablet, Take 1 tablet by mouth Daily With Breakfast., Disp: 30 tablet, Rfl: 5  •  fexofenadine (ALLEGRA) 180 MG tablet, Take 180 mg by mouth Daily., Disp: , Rfl:   •  fluticasone (FLONASE) 50 MCG/ACT nasal spray, 2 sprays into the nostril(s) as directed by provider Daily., Disp: 1 bottle, Rfl: 5  •  furosemide (LASIX) 20 MG tablet, Take 40 mg by mouth Daily., Disp: , Rfl: 3  •  hydrALAZINE (APRESOLINE) 25 MG tablet, Take 1 tablet by mouth 3 (Three) Times a Day., Disp: 90 tablet, Rfl: 5  •  levothyroxine (SYNTHROID, LEVOTHROID) 50 MCG tablet, Take 1 tablet by mouth Daily., Disp: 30 tablet, Rfl: 1  •  lidocaine (XYLOCAINE,URO-JET) 2 % gel, apply TO mucosal membrane THREE TIMES DAILY AS NEEDED AS directed, Disp: , Rfl: 0  •  LORazepam (ATIVAN) 0.5 MG tablet, Take 0.5 mg by mouth 2 (Two) Times a Day., Disp: , Rfl:   •  melatonin 5 MG tablet tablet, Take 5 mg by mouth At Night As Needed., Disp: , Rfl:   •  Naloxegol Oxalate (MOVANTIK) 25 MG tablet, Take 25 mg by mouth Every Morning., Disp: , Rfl:   •  NAMZARIC 28-10 MG capsule sustained-release 24 hr, Take 28 mg by mouth Daily., Disp: 90 capsule, Rfl: 3  •  nitrofurantoin (MACRODANTIN) 100 MG capsule, Take 100 mg by mouth 2 (Two) Times a Day., Disp: , Rfl:   •  oxybutynin XL (DITROPAN-XL)  10 MG 24 hr tablet, Take 1 tablet by mouth Daily., Disp: 30 tablet, Rfl: 5  •  oxyCODONE (ROXICODONE) 5 MG immediate release tablet, Take 1 tablet by mouth Every 8 (Eight) Hours As Needed for Moderate Pain . Scheduled until pain eases and then as needed, Disp: 30 tablet, Rfl: 0  •  potassium chloride (K-DUR) 10 MEQ CR tablet, Take 10 mEq by mouth 2 (Two) Times a Day., Disp: , Rfl: 2  •  predniSONE (DELTASONE) 5 MG tablet, Take 1 tablet by mouth Daily., Disp: 90 tablet, Rfl: 3  •  RESTASIS 0.05 % ophthalmic emulsion, 2 (Two) Times a Day., Disp: , Rfl: 6  •  rOPINIRole (REQUIP) 1 MG tablet, Take 1 tablet by mouth Every Night. Take 1 hour before bedtime., Disp: 30 tablet, Rfl: 5  •  sertraline (ZOLOFT) 100 MG tablet, Take 1 tablet by mouth Daily., Disp: 90 tablet, Rfl: 3  •  tiZANidine (ZANAFLEX) 4 MG tablet, Take 1 tablet by mouth Every 6 (Six) Hours As Needed for Muscle Spasms., Disp: 120 tablet, Rfl: 3  •  predniSONE (DELTASONE) 10 MG tablet, Use 50 mg daily X 2 days, then 40 mg daily X 3 days, then 30 mg daily X 3 days, then 20 mg daily X 5 days, then 10 mg daily X 5 days., Disp: 47 tablet, Rfl: 0    Allergies   Allergen Reactions   • Inderal  [Propranolol] Unknown (See Comments)   • L-Methylfolate-Algae-B12-B6 Unknown (See Comments)   • Morphine Nausea And Vomiting   • Rofecoxib Other (See Comments)   • Topiramate Other (See Comments)       Family History   Problem Relation Age of Onset   • Breast cancer Maternal Aunt    • Leukemia Maternal Aunt        Cancer-related family history includes Breast cancer in her maternal aunt.    Social History     Tobacco Use   • Smoking status: Former Smoker     Packs/day: 1.50     Years: 30.00     Pack years: 45.00     Types: Cigarettes     Last attempt to quit: 2014     Years since quittin.6   • Smokeless tobacco: Never Used   Substance Use Topics   • Alcohol use: No     Frequency: Never   • Drug use: No         I have reviewed the history of present illness, past  "medical history, family history, social history, lab results, all notes and other records since the patient was last seen on  Visit date not found.    ROS:     Review of Systems   Constitutional: Positive for fatigue (has not been able to get up and move around) and unexpected weight change (have lost #20 pounds in the past year). Negative for appetite change, chills and fever.   HENT: Negative for ear pain, mouth sores, nosebleeds and sore throat.    Eyes: Negative for photophobia and visual disturbance.   Respiratory: Negative for shortness of breath, wheezing and stridor.    Cardiovascular: Negative for chest pain and palpitations.   Gastrointestinal: Positive for nausea (sporadic). Negative for abdominal pain, diarrhea and vomiting.   Endocrine: Negative for cold intolerance and heat intolerance.   Genitourinary: Negative for dysuria and hematuria.   Musculoskeletal: Positive for back pain (uses walker at home, cane in office), gait problem and myalgias. Negative for joint swelling and neck stiffness.        Bilateral foot pain associated with neuropathy     Skin: Negative for color change and rash.   Neurological: Positive for weakness (can barely walk outside) and headaches. Negative for seizures and syncope.   Hematological: Negative for adenopathy.        No obvious bleeding   Psychiatric/Behavioral: Negative for agitation, confusion, hallucinations and sleep disturbance.       Objective:    Vitals:    02/04/20 1336   BP: 106/47   Pulse: 80   Resp: 18   Temp: 97.8 °F (36.6 °C)   Weight: 43.5 kg (96 lb)   Height: 160 cm (63\")   PainSc:   5   PainLoc: Back     ECOG  (1) Restricted in physically strenuous activity, ambulatory and able to do work of light nature    Physical Exam:   Physical Exam   Constitutional: She is oriented to person, place, and time. She appears well-developed and well-nourished. No distress.   Somewhat thin appearing elderly female   HENT:   Head: Normocephalic and atraumatic.   Eyes: " Conjunctivae and EOM are normal. Right eye exhibits no discharge. Left eye exhibits no discharge. No scleral icterus.   Neck: Normal range of motion. Neck supple. No thyromegaly present.   Cardiovascular: Normal rate, regular rhythm and normal heart sounds. Exam reveals no gallop and no friction rub.   Pulmonary/Chest: Effort normal. No respiratory distress. She has no wheezes.   Decreased air entry bilaterally in the lungs.   Abdominal: Soft. Bowel sounds are normal. She exhibits no mass. There is no tenderness. There is no rebound and no guarding.   Musculoskeletal: Normal range of motion. She exhibits no tenderness.   Lymphadenopathy:     She has no cervical adenopathy.   Neurological: She is alert and oriented to person, place, and time. She exhibits normal muscle tone.   Skin: Skin is warm. No rash noted. She is not diaphoretic. No erythema.     She has a flesh-colored peeling rash involving her entire bilateral lower legs.  Also several flesh-colored small patches noted on other portions of the body.     Psychiatric: She has a normal mood and affect. Her behavior is normal.   Nursing note and vitals reviewed.            Lab Results - Last 18 Months   Lab Units 01/13/20  1544 01/10/20  1417 12/13/19  1209   WBC 10*3/mm3 9.56 7.87 6.69   HEMOGLOBIN g/dL 10.1* 11.1* 10.6*   HEMATOCRIT % 31.4* 34.0 33.8*   PLATELETS 10*3/mm3 326 290 284   MCV fL 96.9 96.6 98.3*     Lab Results - Last 18 Months   Lab Units 01/10/20  1417 12/13/19  1209 11/22/19  1237   SODIUM mmol/L 137 136 134*   POTASSIUM mmol/L 4.3 4.6 5.0   CHLORIDE mmol/L 98 100 99   CO2 mmol/L 25.0 22.0 24.0   BUN mg/dL 37* 23 31*   CREATININE mg/dL 1.34* 1.10* 1.27*   CALCIUM mg/dL 9.8 9.1 9.6   BILIRUBIN mg/dL 0.4 0.3 <0.2*   ALK PHOS U/L 122* 103 119*   ALT (SGPT) U/L 85* 61* 33   AST (SGOT) U/L 89* 65* 41*   GLUCOSE mg/dL 120* 148* 112*       Lab Results   Component Value Date    GLUCOSE 120 (H) 01/10/2020    BUN 37 (H) 01/10/2020    CREATININE 1.34 (H)  01/10/2020    EGFRIFNONA 38 (L) 01/10/2020    BCR 27.6 (H) 01/10/2020    K 4.3 01/10/2020    CO2 25.0 01/10/2020    CALCIUM 9.8 01/10/2020    ALBUMIN 3.80 01/10/2020    AST 89 (H) 01/10/2020    ALT 85 (H) 01/10/2020       Lab Results - Last 18 Months   Lab Units 09/12/19  0755   INR  0.96   APTT seconds 20.5*       Lab Results   Component Value Date    IRON 45 08/30/2019    TIBC 277 08/30/2019    FERRITIN 295.00 08/30/2019       Lab Results   Component Value Date    FOLATE 9.60 08/30/2019       No results found for: OCCULTBLD    No results found for: RETICCTPCT    Lab Results   Component Value Date    UPGPUKGB44 889 08/30/2019     No results found for: SPEP, UPEP  No results found for: LDH, URICACID  No results found for: JOSAFAT, RF, SEDRATE  No results found for: FIBRINOGEN, HAPTOGLOBIN  Lab Results   Component Value Date    PTT 20.5 (L) 09/12/2019    INR 0.96 09/12/2019     No results found for:   No results found for: CEA  No components found for: CA-19-9  No results found for: PSA          Assessment/Plan     Rash, drug  - predniSONE (DELTASONE) 10 MG tablet    Primary cancer of left lower lobe of lung (CMS/HCC)  - oxyCODONE (ROXICODONE) 5 MG immediate release tablet       Assessment;  1. Metastatic lung cancer: Stage IV .she presented with a left lower lobe lung mass.  PET scan shows liver metastases and bone metastases.  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  Adenosquamous carcinoma tend to be more aggressive than AdenoCa or squamous carcinomas.  Next generation sequencing testing was performed and it failed to show any targetable mutations.  IHC shows PDL 1 score of 60%.  Patient has been started on Keytruda and has received 4 cycles so far.  However repeat CT scan on 1/13/2020 shows stable disease to slight progression.  In addition patient has also been losing weight and appearing cachectic.  Also has erythematous rash involving her both the lower extremities which could be a reaction  to immunotherapy.  2. 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 Lung cancer PDL 1 score 60%   4. Chronic lymphocytic leukemia:  The patient does not have any B symptoms or lymph node swelling.  She is EMERSON Stage 0.  Her bone marrow biopsy previously showed nodular involvement by CLL with nodular deposits occupying 5-10% of marrow cellularity.  FISH panel was negative.  Cytogenetics showed normal karyotype.  Overall, patient did not have any poor prognostic features.  Recent CBC shows a stable white count.  Platelet count is also normal and physical exam does not reveal any lymphadenopathy and she does not have any B symptoms.    5. Bone metastasis: Patient receiving Xgeva.  6. Enhancing lesion MRI brain: Only 4.4 mm.  It does not appear suspicious for metastasis.  7. History of hypothyroidism: Was not on any medications.  Re-started levothyroxine on 11/12/2019  8. Anemia of chronic kidney disease:  Her hemoglobin remains above 10.  She also had iron deficiency and is on iron supplements.   Iron stores are adequate.  She has been taking two iron tablets daily.    9. History of hypercalcemia: PET scan now shows bone metastasis.  PTH is in the high normal range.  Patient has CKD.  .  Vitamin D level is normal.  Will start patient on Xgeva.    10. History of mild iron deficiency.  She is on iron supplement.    11. Hx of Skin lesions:  She is seeing a dermatologist.  She had a skin biopsy.  She also reports having skin cancer excised on her right lower extremity with Dr. Buckley.    12. History of dizziness:  She had a thorough work up.  She saw cardiology.  She also saw an ophthalmologist.  She had a brain MRI which was negative.  13. History of headaches:  She has history of migraines.      PLAN:      1. We will start patient on a steroid taper and she seems to have severe cutaneous reactions related to immunotherapy.  Her symptoms appear to be grade 3 dermatitis  with some signs of TEN/SJS.   BRITTNI CORONEL.  Provided a prescription for prednisone.  2. Discussed with patient about switching chemotherapy treatment to carboplatin and Taxol for improved response.  Will use low-dose weekly carboplatin Taxol as it may be more tolerable in her.    3. Immunotherapy discontinued.  4. Continue Xgeva for bone metastases.  5. Provided prescription for oxycodone.  She will continue fentanyl.  6. Patient will schedule chemotherapy whenever she feels weak.  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  8. Follow-up in 4  weeks           Thank you very much for providing the opportunity to participate in this patient’s care. Please do not hesitate to call if there are any other questions.    I have reviewed all relevant labs results, imaging,Outside reports,notes, vitals, medications and plan with the patient today.    Portions of the note have been Scribed by medical assistant/Nurse.  I have reviewed and made changes accordingly.

## 2020-02-04 ENCOUNTER — OFFICE VISIT (OUTPATIENT)
Dept: ONCOLOGY | Facility: CLINIC | Age: 80
End: 2020-02-04

## 2020-02-04 VITALS
RESPIRATION RATE: 18 BRPM | TEMPERATURE: 97.8 F | WEIGHT: 96 LBS | HEART RATE: 80 BPM | HEIGHT: 63 IN | BODY MASS INDEX: 17.01 KG/M2 | SYSTOLIC BLOOD PRESSURE: 106 MMHG | DIASTOLIC BLOOD PRESSURE: 47 MMHG

## 2020-02-04 DIAGNOSIS — C34.32 PRIMARY CANCER OF LEFT LOWER LOBE OF LUNG (HCC): ICD-10-CM

## 2020-02-04 DIAGNOSIS — C79.51 METASTASIS TO BONE (HCC): ICD-10-CM

## 2020-02-04 DIAGNOSIS — C91.10 CLL (CHRONIC LYMPHOCYTIC LEUKEMIA) (HCC): ICD-10-CM

## 2020-02-04 DIAGNOSIS — R52 PAIN MANAGEMENT: ICD-10-CM

## 2020-02-04 DIAGNOSIS — C78.7 LIVER METASTASIS: ICD-10-CM

## 2020-02-04 DIAGNOSIS — L27.0 RASH, DRUG: Primary | ICD-10-CM

## 2020-02-04 PROCEDURE — 99215 OFFICE O/P EST HI 40 MIN: CPT | Performed by: INTERNAL MEDICINE

## 2020-02-04 RX ORDER — PREDNISONE 10 MG/1
TABLET ORAL
Qty: 47 TABLET | Refills: 0 | Status: SHIPPED | OUTPATIENT
Start: 2020-02-04 | End: 2020-02-11

## 2020-02-04 RX ORDER — OXYCODONE HYDROCHLORIDE 5 MG/1
TABLET ORAL
Qty: 60 TABLET | Refills: 0 | Status: SHIPPED | OUTPATIENT
Start: 2020-02-04 | End: 2020-02-05

## 2020-02-05 DIAGNOSIS — G89.3 CANCER ASSOCIATED PAIN: Primary | ICD-10-CM

## 2020-02-05 DIAGNOSIS — M54.9 BACK PAIN, UNSPECIFIED BACK LOCATION, UNSPECIFIED BACK PAIN LATERALITY, UNSPECIFIED CHRONICITY: ICD-10-CM

## 2020-02-05 RX ORDER — OXYCODONE HYDROCHLORIDE 5 MG/1
5 TABLET ORAL EVERY 8 HOURS PRN
Qty: 60 TABLET | Refills: 0 | Status: SHIPPED | OUTPATIENT
Start: 2020-02-05 | End: 2020-02-11 | Stop reason: SDUPTHER

## 2020-02-10 ENCOUNTER — OFFICE VISIT (OUTPATIENT)
Dept: CARDIOLOGY | Facility: CLINIC | Age: 80
End: 2020-02-10

## 2020-02-10 VITALS — HEIGHT: 63 IN | WEIGHT: 87.4 LBS | HEART RATE: 67 BPM | BODY MASS INDEX: 15.48 KG/M2

## 2020-02-10 DIAGNOSIS — G43.009 MIGRAINE WITHOUT AURA AND WITHOUT STATUS MIGRAINOSUS, NOT INTRACTABLE: ICD-10-CM

## 2020-02-10 DIAGNOSIS — R55 NEAR SYNCOPE: ICD-10-CM

## 2020-02-10 DIAGNOSIS — R01.1 CARDIAC MURMUR: Primary | ICD-10-CM

## 2020-02-10 DIAGNOSIS — I10 ESSENTIAL HYPERTENSION: ICD-10-CM

## 2020-02-10 DIAGNOSIS — R06.02 SHORTNESS OF BREATH: ICD-10-CM

## 2020-02-10 PROCEDURE — 99213 OFFICE O/P EST LOW 20 MIN: CPT | Performed by: INTERNAL MEDICINE

## 2020-02-10 PROCEDURE — 93010 ELECTROCARDIOGRAM REPORT: CPT | Performed by: INTERNAL MEDICINE

## 2020-02-10 RX ORDER — CYCLOBENZAPRINE HCL 10 MG
TABLET ORAL
Qty: 60 TABLET | Refills: 1 | OUTPATIENT
Start: 2020-02-10

## 2020-02-10 NOTE — PROGRESS NOTES
Received call from the patient's daughter reporting that they had requested a refill on Egisc las week and was told that it was done but it is not at the pharmacy.  Called [facility], one month supply given to pharmacist.  Patient's daughter notified and v/u.

## 2020-02-10 NOTE — PROGRESS NOTES
Date of Office Visit: 02/10/2020  Encounter Provider:  Dr. Bart Mayo  Place of Service: [facility] CARDIOLOGY [place]  Patient Name: Ann Andre  :1940  Rochelle Fletcher MD    Chief Complaint   Patient presents with   • Syncope     6 month edema/short of breath,murmur   • Shortness of Breath   • Heart Murmur     History of Present Illness    I am pleased to see Mrs. Andre in my office today as a follow-up.    As you know, patient is 79 years old white female whose past medical history significant for hypertension, history of CVA, neuropathy, history of syncope, who came today for follow-up.    In , patient underwent cardiac catheterization and was found to have no significant CAD.  In , patient has quit smoking.  Patient has history of CVA.  , echocardiogram showed LVEF of 55 to 60%.  Patient had moderate tricuspid regurgitation and mild aortic regurgitation.  In 2019, patient had syncopal episode which appears to be vasovagal in nature.  However possibility of sick sinus syndrome was entertained.  Holter monitor was done which was unremarkable.  Patient had relatively low blood pressure and blood pressure monitoring was recommended.    Since the previous visit, patient is diagnosed with carcinoma of lung with metastasis.  Patient was initially started with immunosuppressive therapy but she could not tolerate it.  She is being considered for possible chemotherapy.  Patient is very frail and weak.  Patient is not enthusiastic to go for chemotherapy.  Patient denies any chest pain or tightness or heaviness.  Mild shortness of breath noted.  No leg edema noted.    EKG showed normal sinus rhythm.  Incomplete right bundle branch block noted.  No change from previous EKG.    At this stage, patient is stable from cardiac standpoint but she is very frail and weak.  I am not sure she would be able to tolerate chemotherapy.  I discussed in detail for advanced therapies.  Patient  is reluctant to go for chemotherapy.  Final decision up to the patient and family.  No cardiac intervention at this stage.      Past Medical History:   Diagnosis Date   • Anemia of renal disease 6/27/2019   • Arthritis    • Cancer (CMS/HCC)    • Chronic back pain    • Chronic kidney disease (CKD)    • Depression    • Elevated cholesterol    • Gastroesophageal reflux disease 9/24/2019   • Hypertension    • Migraine    • Polypharmacy 6/27/2019   • Protein-calorie malnutrition, moderate (CMS/HCC) 6/27/2019   • Restless leg syndrome    • Stroke (CMS/HCC)          Past Surgical History:   Procedure Laterality Date   • APPENDECTOMY     • CHOLECYSTECTOMY     • HYSTERECTOMY     • JOINT REPLACEMENT     • OOPHORECTOMY     • SHOULDER SURGERY Left            Current Outpatient Medications:   •  Biotin 5000 MCG capsule, Take  by mouth Daily., Disp: , Rfl:   •  butalbital-acetaminophen-caffeine (ESGIC) -40 MG per tablet, Take 1 tablet by mouth 2 (Two) Times a Day As Needed for Headache., Disp: 60 tablet, Rfl: 0  •  calcitriol (ROCALTROL) 0.25 MCG capsule, Take 1 capsule by mouth Daily., Disp: 30 capsule, Rfl: 5  •  Cranberry-Cholecalciferol 4200-500 MG-UNIT capsule, Take  by mouth Daily., Disp: , Rfl:   •  diphenoxylate-atropine (LOMOTIL) 2.5-0.025 MG per tablet, diphenoxylate-atropine 2.5 mg-0.025 mg tablet  take 2 tablets by mouth immediately then 1 tablet by mouth AFTER ...  (REFER TO PRESCRIPTION NOTES)., Disp: , Rfl:   •  docusate sodium (COLACE) 100 MG capsule, Take 1 capsule by mouth Daily., Disp: 90 capsule, Rfl: 3  •  DULoxetine (CYMBALTA) 60 MG capsule, Take 1 capsule by mouth Daily., Disp: 90 capsule, Rfl: 3  •  EPINEPHrine (EPIPEN) 0.3 MG/0.3ML solution auto-injector injection, Daily., Disp: , Rfl:   •  fentaNYL (DURAGESIC) 50 MCG/HR patch, Place 1 patch on the skin as directed by provider Every 72 (Seventy-Two) Hours., Disp: 10 patch, Rfl: 0  •  ferrous sulfate 325 (65 FE) MG tablet, Take 1 tablet by mouth  Daily With Breakfast., Disp: 30 tablet, Rfl: 5  •  fexofenadine (ALLEGRA) 180 MG tablet, Take 180 mg by mouth Daily., Disp: , Rfl:   •  fluticasone (FLONASE) 50 MCG/ACT nasal spray, 2 sprays into the nostril(s) as directed by provider Daily., Disp: 1 bottle, Rfl: 5  •  furosemide (LASIX) 20 MG tablet, Take 40 mg by mouth Daily., Disp: , Rfl: 3  •  hydrALAZINE (APRESOLINE) 25 MG tablet, Take 1 tablet by mouth 3 (Three) Times a Day., Disp: 90 tablet, Rfl: 5  •  levothyroxine (SYNTHROID, LEVOTHROID) 50 MCG tablet, Take 1 tablet by mouth Daily., Disp: 30 tablet, Rfl: 1  •  lidocaine (XYLOCAINE,URO-JET) 2 % gel, apply TO mucosal membrane THREE TIMES DAILY AS NEEDED AS directed, Disp: , Rfl: 0  •  LORazepam (ATIVAN) 0.5 MG tablet, Take 0.5 mg by mouth 2 (Two) Times a Day., Disp: , Rfl:   •  melatonin 5 MG tablet tablet, Take 5 mg by mouth At Night As Needed., Disp: , Rfl:   •  Naloxegol Oxalate (MOVANTIK) 25 MG tablet, Take 25 mg by mouth Every Morning., Disp: , Rfl:   •  NAMZARIC 28-10 MG capsule sustained-release 24 hr, Take 28 mg by mouth Daily., Disp: 90 capsule, Rfl: 3  •  nitrofurantoin (MACRODANTIN) 100 MG capsule, Take 100 mg by mouth 2 (Two) Times a Day., Disp: , Rfl:   •  oxybutynin XL (DITROPAN-XL) 10 MG 24 hr tablet, Take 1 tablet by mouth Daily., Disp: 30 tablet, Rfl: 5  •  oxyCODONE (ROXICODONE) 5 MG immediate release tablet, Take 1 tablet by mouth Every 8 (Eight) Hours As Needed for Moderate Pain ., Disp: 60 tablet, Rfl: 0  •  potassium chloride (K-DUR) 10 MEQ CR tablet, Take 10 mEq by mouth 2 (Two) Times a Day., Disp: , Rfl: 2  •  predniSONE (DELTASONE) 10 MG tablet, Use 50 mg daily X 2 days, then 40 mg daily X 3 days, then 30 mg daily X 3 days, then 20 mg daily X 5 days, then 10 mg daily X 5 days., Disp: 47 tablet, Rfl: 0  •  predniSONE (DELTASONE) 5 MG tablet, Take 1 tablet by mouth Daily., Disp: 90 tablet, Rfl: 3  •  RESTASIS 0.05 % ophthalmic emulsion, 2 (Two) Times a Day., Disp: , Rfl: 6  •   rOPINIRole (REQUIP) 1 MG tablet, Take 1 tablet by mouth Every Night. Take 1 hour before bedtime., Disp: 30 tablet, Rfl: 5  •  sertraline (ZOLOFT) 100 MG tablet, Take 1 tablet by mouth Daily., Disp: 90 tablet, Rfl: 3  •  tiZANidine (ZANAFLEX) 4 MG tablet, Take 1 tablet by mouth Every 6 (Six) Hours As Needed for Muscle Spasms., Disp: 120 tablet, Rfl: 3      Social History     Socioeconomic History   • Marital status:      Spouse name: Not on file   • Number of children: Not on file   • Years of education: Not on file   • Highest education level: Not on file   Tobacco Use   • Smoking status: Current Some Day Smoker     Packs/day: 1.50     Years: 30.00     Pack years: 45.00     Types: Cigarettes     Last attempt to quit: 2014     Years since quittin.6   • Smokeless tobacco: Never Used   • Tobacco comment: Patient does not want to quit   Substance and Sexual Activity   • Alcohol use: No     Frequency: Never   • Drug use: No   • Sexual activity: Defer   Social History Narrative    She lives alone in [place], is , retired and has three grown children. She used to smoke about 1 1/2 ppd for 30 years and quit in . She reports a history of heavy alcohol use for about 40 years and now drinks socially. She does not use recreational drugs.         Review of Systems   Constitution: Positive for decreased appetite and weight loss. Negative for chills and fever.   HENT: Negative for ear discharge and nosebleeds.    Eyes: Negative for discharge and redness.   Cardiovascular: Negative for chest pain, orthopnea, palpitations, paroxysmal nocturnal dyspnea and syncope.   Respiratory: Positive for shortness of breath. Negative for cough and wheezing.    Endocrine: Negative for heat intolerance.   Skin: Negative for rash.   Musculoskeletal: Positive for arthritis and joint pain. Negative for myalgias.   Gastrointestinal: Negative for abdominal pain, melena, nausea and vomiting.   Genitourinary: Negative for  "dysuria and hematuria.   Neurological: Negative for dizziness, light-headedness, numbness and tremors.   Psychiatric/Behavioral: Negative for depression. The patient is not nervous/anxious.        Procedures    Procedures    No orders to display           Objective:    Pulse 67   Ht 160 cm (62.99\")   Wt 39.6 kg (87 lb 6.4 oz)   BMI 15.49 kg/m²         Physical Exam   Constitutional: She is oriented to person, place, and time.   Frail and weak   HENT:   Head: Normocephalic and atraumatic.   Eyes: Right eye exhibits no discharge. No scleral icterus.   Neck: No thyromegaly present.   Cardiovascular: Normal rate, regular rhythm and normal heart sounds. Exam reveals no gallop and no friction rub.   No murmur heard.  Pulmonary/Chest: Effort normal and breath sounds normal. No respiratory distress. She has no wheezes. She has no rales.   Abdominal: There is no tenderness.   Musculoskeletal: She exhibits no edema.   Lymphadenopathy:     She has no cervical adenopathy.   Neurological: She is alert and oriented to person, place, and time.   Skin: No rash noted. No erythema.   Psychiatric: She has a normal mood and affect.           Assessment:       Diagnosis Plan   1. Cardiac murmur     2. Near syncope     3. Shortness of breath     4. Essential hypertension              Plan:       Unfortunately patient is diagnosed with carcinoma of lung with metastasis.  Patient is being considered for chemotherapy.  At this stage I will continue current treatment.  Blood pressure monitoring is recommended.  No further cardiac intervention  "

## 2020-02-11 ENCOUNTER — OFFICE VISIT (OUTPATIENT)
Dept: FAMILY MEDICINE CLINIC | Facility: CLINIC | Age: 80
End: 2020-02-11

## 2020-02-11 VITALS
HEIGHT: 63 IN | BODY MASS INDEX: 15.95 KG/M2 | SYSTOLIC BLOOD PRESSURE: 121 MMHG | OXYGEN SATURATION: 98 % | HEART RATE: 88 BPM | DIASTOLIC BLOOD PRESSURE: 73 MMHG | WEIGHT: 90 LBS | RESPIRATION RATE: 18 BRPM | TEMPERATURE: 99 F

## 2020-02-11 DIAGNOSIS — M51.36 DEGENERATION OF LUMBAR INTERVERTEBRAL DISC: ICD-10-CM

## 2020-02-11 DIAGNOSIS — G89.3 CHRONIC PAIN DUE TO NEOPLASM: ICD-10-CM

## 2020-02-11 DIAGNOSIS — C78.7 LIVER METASTASIS: ICD-10-CM

## 2020-02-11 DIAGNOSIS — G89.3 CANCER ASSOCIATED PAIN: ICD-10-CM

## 2020-02-11 DIAGNOSIS — C34.32 PRIMARY CANCER OF LEFT LOWER LOBE OF LUNG (HCC): Primary | ICD-10-CM

## 2020-02-11 DIAGNOSIS — C79.51 METASTASIS TO BONE (HCC): ICD-10-CM

## 2020-02-11 DIAGNOSIS — M54.9 BACK PAIN, UNSPECIFIED BACK LOCATION, UNSPECIFIED BACK PAIN LATERALITY, UNSPECIFIED CHRONICITY: ICD-10-CM

## 2020-02-11 PROCEDURE — 99213 OFFICE O/P EST LOW 20 MIN: CPT | Performed by: FAMILY MEDICINE

## 2020-02-11 RX ORDER — FENTANYL 50 UG/H
1 PATCH TRANSDERMAL
Qty: 10 PATCH | Refills: 0 | Status: SHIPPED | OUTPATIENT
Start: 2020-02-11

## 2020-02-11 RX ORDER — OXYCODONE HYDROCHLORIDE 5 MG/1
5 TABLET ORAL EVERY 8 HOURS PRN
Qty: 60 TABLET | Refills: 0 | Status: SHIPPED | OUTPATIENT
Start: 2020-02-11

## 2020-02-11 RX ORDER — BUTALBITAL, ACETAMINOPHEN AND CAFFEINE 50; 325; 40 MG/1; MG/1; MG/1
1 TABLET ORAL 2 TIMES DAILY PRN
Qty: 60 TABLET | OUTPATIENT
Start: 2020-02-11

## 2020-02-11 NOTE — PROGRESS NOTES
Subjective   Ann Andre is a 79 y.o. female.   Chief Complaint   Patient presents with   • Pain     Chronic back pain - Fentanyl patch is definitely helping her now. Last po pain pill was yesterday as well as patch being changed yesterday.        History of Present Illness   Presents today for routine follow-up on chronic pain.     Her pain is under improved control on the fentanyl patch with the recent addition of oxycodone 5 mg tablets up to 3 times daily.  She typically takes those twice per day.  We are still trying to make arrangements for her to go to pain clinic for consideration of injections in her back.  This has not happened yet. Inspect, COMM, patient pain inventory are all reviewed.  She is having no side effects of her medicine such as confusion or delirium.  No change in bowel habits.  She is able to rest well at night.  She continues to have a fair appetite and eats several times per day.  Her weight is actually down 6 pounds though from last month.  The above referenced oxycodone was originally prescribed by her oncologist when she was reevaluated.  I have suggested and recommend that I take over prescribing that.  She agrees.    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[facility]) 09/24/2019   • Chronic constipation 09/24/2019   • Chronic pain 09/24/2019   • Chronic low back pain 09/24/2019   • Degeneration of lumbar intervertebral disc 09/24/2019   • Dementia (CMS/[facility]) 09/24/2019   • Depressive disorder 09/24/2019   • Dysphagia 09/24/2019   • Fatigue 09/24/2019   • Gastroesophageal reflux disease 09/24/2019   • Gastro-esophageal reflux disease with esophagitis 09/24/2019   • Generalized osteoarthritis 09/24/2019    • Generalized headache 09/24/2019   • Hypothyroidism 09/24/2019   • Insomnia 09/24/2019   • Leukocytosis 09/24/2019   • Nausea 09/24/2019   • Malignant tumor of urinary bladder (CMS/HCC) 09/24/2019   • Neuropathy 09/24/2019   • Overactive bladder 09/24/2019   • Senile osteopenia 09/24/2019   • Stress incontinence of urine 09/24/2019   • Urethral stenosis 09/24/2019   • Urinary incontinence 09/24/2019   • Vomiting 09/24/2019   • Chronic lymphocytic leukemia (CMS/HCC) 09/24/2019   • Essential hypertension 09/24/2019   • Liver metastasis (CMS/HCC) 09/24/2019   • Lymphadenopathy 09/24/2019   • Metastasis to bone (CMS/HCC) 09/24/2019   • Primary cancer of left lower lobe of lung (CMS/HCC) 08/30/2019     Note Last Updated: 11/11/2019     mets to bone and liver on PET scan     • CLL (chronic lymphocytic leukemia) (CMS/HCC) 08/29/2019     Note Last Updated: 11/11/2019     Followed by Dr. Batista     • Anemia of chronic kidney failure, unspecified stage 08/29/2019   • Hypercalcemia 08/29/2019   • Iron deficiency 08/29/2019   • Chronic headaches 08/29/2019   • Dizziness 08/29/2019   • Fall 06/27/2019   • Chronic kidney disease (CKD) 06/27/2019   • Anemia of renal disease 06/27/2019   • Polypharmacy 06/27/2019   • Protein-calorie malnutrition, moderate (CMS/HCC) 06/27/2019   • Near syncope 02/04/2019   • Shortness of breath 06/12/2017   • Cardiac murmur 06/12/2017   • Migraine 01/27/2017           Past Surgical History:   Procedure Laterality Date   • APPENDECTOMY     • CHOLECYSTECTOMY     • HYSTERECTOMY     • JOINT REPLACEMENT     • OOPHORECTOMY     • SHOULDER SURGERY Left      Current Outpatient Medications on File Prior to Visit   Medication Sig   • Biotin 5000 MCG capsule Take  by mouth Daily.   • butalbital-acetaminophen-caffeine (ESGIC) -40 MG per tablet Take 1 tablet by mouth 2 (Two) Times a Day As Needed for Headache.   • calcitriol (ROCALTROL) 0.25 MCG capsule Take 1 capsule by mouth Daily.   •  Cranberry-Cholecalciferol 4200-500 MG-UNIT capsule Take  by mouth Daily.   • diphenoxylate-atropine (LOMOTIL) 2.5-0.025 MG per tablet diphenoxylate-atropine 2.5 mg-0.025 mg tablet   take 2 tablets by mouth immediately then 1 tablet by mouth AFTER ...  (REFER TO PRESCRIPTION NOTES).   • docusate sodium (COLACE) 100 MG capsule Take 1 capsule by mouth Daily.   • DULoxetine (CYMBALTA) 60 MG capsule Take 1 capsule by mouth Daily.   • EPINEPHrine (EPIPEN) 0.3 MG/0.3ML solution auto-injector injection Daily.   • ferrous sulfate 325 (65 FE) MG tablet Take 1 tablet by mouth Daily With Breakfast.   • fexofenadine (ALLEGRA) 180 MG tablet Take 180 mg by mouth Daily.   • fluticasone (FLONASE) 50 MCG/ACT nasal spray 2 sprays into the nostril(s) as directed by provider Daily.   • furosemide (LASIX) 20 MG tablet Take 40 mg by mouth Daily.   • hydrALAZINE (APRESOLINE) 25 MG tablet Take 1 tablet by mouth 3 (Three) Times a Day.   • levothyroxine (SYNTHROID, LEVOTHROID) 50 MCG tablet Take 1 tablet by mouth Daily.   • lidocaine (XYLOCAINE,URO-JET) 2 % gel apply TO mucosal membrane THREE TIMES DAILY AS NEEDED AS directed   • LORazepam (ATIVAN) 0.5 MG tablet Take 0.5 mg by mouth 2 (Two) Times a Day.   • melatonin 5 MG tablet tablet Take 5 mg by mouth At Night As Needed.   • Naloxegol Oxalate (MOVANTIK) 25 MG tablet Take 25 mg by mouth Every Morning.   • NAMZARIC 28-10 MG capsule sustained-release 24 hr Take 28 mg by mouth Daily.   • nitrofurantoin (MACRODANTIN) 100 MG capsule Take 100 mg by mouth 2 (Two) Times a Day.   • oxybutynin XL (DITROPAN-XL) 10 MG 24 hr tablet Take 1 tablet by mouth Daily.   • potassium chloride (K-DUR) 10 MEQ CR tablet Take 10 mEq by mouth 2 (Two) Times a Day.   • predniSONE (DELTASONE) 5 MG tablet Take 1 tablet by mouth Daily.   • RESTASIS 0.05 % ophthalmic emulsion 2 (Two) Times a Day.   • rOPINIRole (REQUIP) 1 MG tablet Take 1 tablet by mouth Every Night. Take 1 hour before bedtime.   • sertraline (ZOLOFT) 100  MG tablet Take 1 tablet by mouth Daily.   • tiZANidine (ZANAFLEX) 4 MG tablet Take 1 tablet by mouth Every 6 (Six) Hours As Needed for Muscle Spasms.   • [DISCONTINUED] fentaNYL (DURAGESIC) 50 MCG/HR patch Place 1 patch on the skin as directed by provider Every 72 (Seventy-Two) Hours.   • [DISCONTINUED] oxyCODONE (ROXICODONE) 5 MG immediate release tablet Take 1 tablet by mouth Every 8 (Eight) Hours As Needed for Moderate Pain .   • [DISCONTINUED] predniSONE (DELTASONE) 10 MG tablet Use 50 mg daily X 2 days, then 40 mg daily X 3 days, then 30 mg daily X 3 days, then 20 mg daily X 5 days, then 10 mg daily X 5 days.     No current facility-administered medications on file prior to visit.      Allergies   Allergen Reactions   • Inderal  [Propranolol] Unknown (See Comments)   • L-Methylfolate-Algae-B12-B6 Unknown (See Comments)   • Morphine Nausea And Vomiting   • Rofecoxib Other (See Comments)   • Topiramate Other (See Comments)     Social History     Socioeconomic History   • Marital status:      Spouse name: Not on file   • Number of children: Not on file   • Years of education: Not on file   • Highest education level: Not on file   Tobacco Use   • Smoking status: Current Some Day Smoker     Packs/day: 1.50     Years: 30.00     Pack years: 45.00     Types: Cigarettes     Last attempt to quit: 2014     Years since quittin.6   • Smokeless tobacco: Never Used   • Tobacco comment: Patient does not want to quit   Substance and Sexual Activity   • Alcohol use: No     Frequency: Never   • Drug use: No   • Sexual activity: Defer   Social History Narrative    She lives alone in [place], is , retired and has three grown children. She used to smoke about 1 1/2 ppd for 30 years and quit in . She reports a history of heavy alcohol use for about 40 years and now drinks socially. She does not use recreational drugs.     Family History   Problem Relation Age of Onset   • Breast cancer Maternal Aunt    •  "Leukemia Maternal Aunt      The following portions of the patient's history were reviewed and updated as appropriate: allergies, current medications, past family history, past medical history, past social history, past surgical history and problem list.    Review of Systems    Objective   /73 (BP Location: Left arm, Patient Position: Sitting, Cuff Size: Adult)   Pulse 88   Temp 99 °F (37.2 °C) (Oral)   Resp 18   Ht 160 cm (63\")   Wt 40.8 kg (90 lb)   SpO2 98%   BMI 15.94 kg/m²   Physical Exam   Constitutional: She is oriented to person, place, and time. She appears well-nourished. No distress.   Small statured elderly WF NAD.     HENT:   Head: Normocephalic and atraumatic.   Eyes: Pupils are equal, round, and reactive to light. EOM are normal.   Neck: Neck supple. No JVD present. No thyromegaly present.   Cardiovascular: Normal rate, regular rhythm and normal heart sounds.   No murmur heard.  Pulmonary/Chest: Effort normal and breath sounds normal. No respiratory distress.   Abdominal: Soft. Bowel sounds are normal. She exhibits no distension. There is no tenderness. There is no guarding.   Musculoskeletal:   Muscle wasting in all 4 extremities   Neurological: She is alert and oriented to person, place, and time.   Skin: Skin is warm and dry. No rash noted.   Psychiatric: She has a normal mood and affect. Her behavior is normal.               Assessment/Plan   Diagnoses and all orders for this visit:    1. Primary cancer of left lower lobe of lung (CMS/HCC) (Primary)  -     fentaNYL (DURAGESIC) 50 MCG/HR patch; Place 1 patch on the skin as directed by provider Every 72 (Seventy-Two) Hours.  Dispense: 10 patch; Refill: 0    2. Liver metastasis (CMS/HCC)  -     fentaNYL (DURAGESIC) 50 MCG/HR patch; Place 1 patch on the skin as directed by provider Every 72 (Seventy-Two) Hours.  Dispense: 10 patch; Refill: 0    3. Chronic pain due to neoplasm    4. Metastasis to bone (CMS/HCC)  -     fentaNYL (DURAGESIC) " 50 MCG/HR patch; Place 1 patch on the skin as directed by provider Every 72 (Seventy-Two) Hours.  Dispense: 10 patch; Refill: 0    5. Degeneration of lumbar intervertebral disc    6. Back pain, unspecified back location, unspecified back pain laterality, unspecified chronicity  -     oxyCODONE (ROXICODONE) 5 MG immediate release tablet; Take 1 tablet by mouth Every 8 (Eight) Hours As Needed for Moderate Pain .  Dispense: 60 tablet; Refill: 0    7. Cancer associated pain  -     oxyCODONE (ROXICODONE) 5 MG immediate release tablet; Take 1 tablet by mouth Every 8 (Eight) Hours As Needed for Moderate Pain .  Dispense: 60 tablet; Refill: 0    Basically no change in her pain condition.  She has improved and is stable now.  Overall her condition continues to get a little bit worse.  She is losing weight despite trying to eat.  We will continue all current pain treatments as above.         Return in about 4 weeks (around 3/10/2020).    Call with any problems or concerns before next visit

## 2020-02-13 ENCOUNTER — TELEPHONE (OUTPATIENT)
Dept: ONCOLOGY | Facility: CLINIC | Age: 80
End: 2020-02-13

## 2020-02-25 DIAGNOSIS — C79.51 METASTASIS TO BONE (HCC): ICD-10-CM

## 2020-02-25 DIAGNOSIS — C78.7 LIVER METASTASIS: ICD-10-CM

## 2020-02-25 DIAGNOSIS — C34.32 PRIMARY CANCER OF LEFT LOWER LOBE OF LUNG (HCC): ICD-10-CM

## 2020-02-25 RX ORDER — FENTANYL 50 UG/H
1 PATCH TRANSDERMAL
Qty: 10 PATCH | Refills: 0 | OUTPATIENT
Start: 2020-02-25

## 2020-02-25 NOTE — TELEPHONE ENCOUNTER
Received refill request for patient's Fentanyl patches. This medication was just sent over on 2- for a 30 day supply. Patient has upcoming appt  With PCP on 3-. Refill denied as to where it is too soon to refill.

## 2020-02-26 RX ORDER — OXYBUTYNIN CHLORIDE 10 MG/1
10 TABLET, EXTENDED RELEASE ORAL DAILY
Qty: 90 TABLET | Refills: 1 | Status: SHIPPED | OUTPATIENT
Start: 2020-02-26 | End: 2020-03-20 | Stop reason: SDUPTHER

## 2020-02-26 RX ORDER — ROPINIROLE 1 MG/1
TABLET, FILM COATED ORAL
Qty: 90 TABLET | Refills: 1 | Status: SHIPPED | OUTPATIENT
Start: 2020-02-26 | End: 2020-03-20 | Stop reason: SDUPTHER

## 2020-02-26 NOTE — TELEPHONE ENCOUNTER
Received refill request from pharmacy for patient's Requip and Oxybuytnin. Meds are loaded and ready to send.    Last OV: 2-  Next OV: 3-  Last Labs: 1-

## 2020-02-27 ENCOUNTER — TELEPHONE (OUTPATIENT)
Dept: ONCOLOGY | Facility: CLINIC | Age: 80
End: 2020-02-27

## 2020-02-27 NOTE — TELEPHONE ENCOUNTER
Talk with patient daughter regarding ms cory treatment stated that she not going to take anymore treatment

## 2020-02-28 ENCOUNTER — TELEPHONE (OUTPATIENT)
Dept: FAMILY MEDICINE CLINIC | Facility: CLINIC | Age: 80
End: 2020-02-28

## 2020-02-28 RX ORDER — ONDANSETRON 4 MG/1
4 TABLET, ORALLY DISINTEGRATING ORAL EVERY 6 HOURS PRN
Qty: 24 TABLET | Refills: 0 | Status: SHIPPED | OUTPATIENT
Start: 2020-02-28

## 2020-02-28 RX ORDER — ONDANSETRON 4 MG/1
1 TABLET, ORALLY DISINTEGRATING ORAL EVERY 6 HOURS PRN
COMMUNITY
End: 2020-02-28 | Stop reason: SDUPTHER

## 2020-02-28 NOTE — TELEPHONE ENCOUNTER
Jenelle, nurse with [facility] called into office at this time and states that this patient's HHA noted and reported this am that patient vomited 2-3x this am. Nurse unable to voice the amount of emesis that was produced. However, patient is requesting Zofran due to phenergan not working. S/w Dr. LONNIE Fletcher at this time who states that we can send her over Zofran 4mg 1 po q6h prn n/v, but that if vomiting persists or worsens, then patient needs to go to ER due to high risk of dehydration. Nurse Jenelle notified of this. Per Dr. LONNIE Fletcher, send #24. Medication sent.

## 2020-03-02 NOTE — PROGRESS NOTES
HEMATOLOGY ONCOLOGY FOLLOW UP        Patient name: Ann Andre  : 1940  MRN: [id number]  Primary Care Physician: Rochelle Fletcher MD  Referring Physician: No ref. provider found  Reason For Consult:     No chief complaint on file.  Metastatic lung cancer  Chronic lymphocytic leukemia  Metastatic lung cancer    History of Present Illness:  Ms. Andre is a pleasant 78-year-old female who underwent lab work at her Primary Care Physician’s office on 10/27/14 that showed leukocytosis with a white count of 12.1 and normal hemoglobin and platelet count.  Her absolute lymphocyte count is 8,000 (elevated).  ESR was normal at 6.  The patient was therefore referred for hematology for further evaluation of the lymphocytosis.  She also has chronic kidney disease and her creatinine was 1.8 on 10/29/14.      14 - On initial evaluation in our office, WBC 12.2, hemoglobin 10.8, platelet count 311,000, lymphocyte count is almost 6,000.  The patient denied any fever, chills, night sweats, weight loss or lymph node swelling.  She does report having chronic anemia and also had received B12 injections in the past.  She reports history of mild chronic kidney disease.    • 14 - Creatinine 2.3, BUN 27, iron saturation 22%, TIBC 286, serum iron 64, ferritin 232, B12 566, reticulocyte count 1.3%.   • 14 - Flow cytometry:  Immunophenotyping identifies CD5 positive, CD23 positive, Monoclonal copper B cell population representing 24% of the total events.  CD 38 was negative.  This represents CLL.   • 1/22/15 - Bone marrow biopsy:  Variable cellular bone marrow with involvement by SLL/CLL in a nodular pattern.  Flow cytometry involvement 24% by CLL/SLL.  Nodular pattern of spread is considered a good prognostic finding.   Examination with PAS stain revealed several interstitial nodules which occupy 5% to 10% of the total marrow cellularity.  Cytogenetics showed normal karyotype.  CLL FISH panel:   Negative.  Specific probes for TP53 on chromosome 17, NABIL on chromosome 11, chromosome 12 and chromosome 13 were negative.  This indicates good prognosis.    • 2/12/15 - WBC 11.1, hemoglobin 10.9, platelet count 360,000, MCV 98.6.   • 7/14/15 - WBC 9.2, hemoglobin 10.1, platelet count 306,000, MCV 94.1.  • 4/21/15 - Iron saturation 21%, TIBC 264,000, serum iron 56, ferritin 199, EPO level 6, reticulocyte count 1.6%, haptoglobin 124.    • 10/20/15 - WBC 10.7, hemoglobin 11.2, platelet count 348,000.  • 2/16/16 - Iron saturation 25%, TIBC 250, serum iron 63.  WBC 7.7, hemoglobin 10.9, platelet count 293,000, MCV 91.3, lymphocytes 3,900.  • 3/14/16 - Creatinine 1.48.  • 8/30/16 - Lymphocyte count 4,370.  Iron saturation 26%, TIBC 262, serum iron 67.    • 9/6/16 - WBC 11.5, hemoglobin 11.6, platelet count 334,000, MCV 93.6.  • 1/2/17 - WBC 7.2, hemoglobin 10.6, platelet count 310,000, MCV 97.2.    • 5/22/17 - WBC 8.9, hemoglobin 10.7, platelet count 294,000, MCV 99.7.  Absolute lymphocyte count 3.66 (H).  Iron 50, TIBC 205, iron saturation 20%.    • 3/27/18 - WBC 10.1, hemoglobin 11, MCV 96.7, platelet count 301,000.  Serum iron 80, iron saturation 31%, TIBC 256.  Creatinine 1.5, BUN 27.    • 9/20/18 - WBC 9, hemoglobin 10, platelet count 284,000, .5.  Absolute lymphocyte count 4250 (H).  Ferritin 351.  Iron 51, TIBC 197.  • 12/28/18 - Creatinine 1.7, BUN 31.  Calcium 10.7 (H), albumin 4.1.  WBC 8.7, hemoglobin 10.3, platelet count 375,000, .6.    • 1/8/19 - TSH 1.74.  Ferritin 379.  Iron saturation 14%, TIBC 261, serum iron 37 (L).  SPEP appears normal.  Serum immunofixation:  No monoclonal band.  PTH intact 63 (normal range 14-64).  Serum calcium 10.3 (N).    • 3/12/19 - WBC 7.6, hemoglobin 11.5, platelet count 312,000, .9.  Creatinine 1.38, BUN 22, calcium 10.9 (H).  Vitamin D level 49 (N).  • 7/22/2019 CT scan chest without contrast: There is a new mass, multilobulated within the left lower  lobe, measures 3.1 x 2.3 cm.  Adjacent scarring is present as well.  Evidence of prior granulomatous disease, no obvious mediastinal or hilar lymphadenopathy..  Aneurysm of the ascending thoracic aorta.  CT scan was done in the context of left rib cage pain after having a fall.  • 7/18/2019 MRI brain: Possible enhancing lesion seen only on coronal image 8 measures 4.4 mm within the right precentral gyrus.  Per the radiologist, this could be an old infarct or metastases or pulsation artifact.  No acute infarct.  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  FDG avid left hilar lymphadenopathy with SUV of 8.2..  Multiple FDG avid hepatic and osseous lesions compatible with the metastasis.  There are approximately 15-20 FDG avid osseous lesions compatible with mets.  Index lesion involving the left posterior eighth rib demonstrates lytic changes with SUV of 12.9.  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 The biopsy is stained via immunohistochemical technique utilizing appropriate controls for the presence of CK7, CK5/6, p63, TTF-1, and napsin A. The tumor cells are strongly positive for all markers tested and indicate both a squamous and an adenocarcinoma component.  • 9/23/2019 WBC 9.3, hemoglobin 10, platelets 306, .3  • 9/24/2019 brain MRI with and without contrast: No evidence of any brain metastasis.  • October 7, 2019 Next generation sequencing testing by [facility]= PDL 1 by IHC 60%.  Testing is negative for Sylvester BRAF, ROS 1, MET, and NTRK, K-walt, ERBB 2, RET, STK11,TP53.. 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  • 10/30/2019 patient received cycle 1 of Keytruda.  TSH 4.44 high, free T4 1.05 patient received Xgeva  • 10/30/2019 WBC 6.7, hemoglobin 10.0, platelets 360, creatinine 1.33,   • 11/22/2019 patient received Keytruda 200 mg, creatinine 1.27, BUN 31,  • 12/6/2019 patient received Xgeva  • 12/13/2019 patient received Keytruda 200 mg cycle 3.  WBC 6.6, hemoglobin 10.6, platelets 284, creatinine 1.1, AST 65, ALT 61, TSH 1.8, free T4 1.08  • 01/10/2020- Keytruda and Xgeva Cycle 4   • 1/13/2019 CT chest abdomen and pelvis without contrast: Posterior left lower lobe lobulated mass measures larger than on 07/22/2019. It appears slightly different in morphology when compared to the previous study. Disease progression cannot be excluded. However, the measurements may be slightly artifactually increased due to the presence of adjacent atelectasis.. Osteolytic lesions within the left third and eighth ribs, right iliac wing, and questionably within the left iliac wing and right femoral head, consistent with osseous metastatic disease. Additionally, there is thickening and sclerosis of the L4 spinous process and right lamina highly suspicious for metastatic disease  • 1/13/2020 WBC 9.5, hemoglobin 10.1, platelets are 326  • 1/10/2020: Patient received Keytruda.  Patient received Xgeva.  WBC 7.8, hemoglobin 11.1, platelets 290,Creatinine 1.3, BUN 37, creatinine 1.34, AST 89, ALT 85, AST 89, ALT 85  • 1/13/2020: CT chest abdomen pelvis without contrast: Posterior left lower lobe lobulated mass measures larger than July 2019.  It is slightly different in morphology.  Disease progression cannot be excluded.  Osteolytic lesions within the left third and eighth ribs, right iliac wing, questionably in the right femoral head consistent with bone mets.  Chronic appearing L2  compression fracture.  • 1/22/2020 MRI lumbar spine without contrast: Lumbar dextroscoliosis and multilevel lumbar spondylosis.  Moderate spinal canal stenosis at L1-L2.  Moderate bilateral neural foraminal narrowing, at L2-L3, L3-L4, L4-L5, at L5-S1 there is a partial effacement of the right lateral recess.  Heterogeneous bone marrow signal in the T12 vertebral body and posterior right iliac bone consistent with osseous metastasis.       Subjective:03/03/2020   Patient presents to the office for follow up of lung cancer. She is accompanied by her daughter. She continues to take iron daily. Her daughter states that she is on Macro-bid for long-term use for kidney issues. Today, she brought in a Power of  granted by the patient to her daughter. We will make copies.  She was not a candidate for immunotherapy due to side effects and they have decided to not follow up with chemo. They are deciding to look for hospice. She had a recent CT scan. Her daughter states that she already has someone that comes to her house several times a week. She states that she has been eating better, but still has been eating better. She denies any diarrhea. She is no longer taking a lot of medications that she was previously taking.    She has been losing weight.  She has a rash on her bilateral lower extremities and was treated for cellulitis.  Symptoms have improved somewhat but she continues to have erythema on her feet and her lower legs.  She says appetite is good but she is losing weight.        Past Medical History:   Diagnosis Date   • Anemia of renal disease 6/27/2019   • Arthritis    • Cancer (CMS/HCC)    • Chronic back pain    • Chronic kidney disease (CKD)    • Depression    • Dermatitis    • Elevated cholesterol    • Gastroesophageal reflux disease 9/24/2019   • Hypertension    • Migraine    • Polypharmacy 6/27/2019   • Protein-calorie malnutrition, moderate (CMS/HCC) 6/27/2019   • Restless leg syndrome    • Stroke  (CMS/[facility])        Past Surgical History:   Procedure Laterality Date   • APPENDECTOMY     • CHOLECYSTECTOMY     • HYSTERECTOMY     • JOINT REPLACEMENT     • OOPHORECTOMY     • SHOULDER SURGERY Left          Current Outpatient Medications:   •  Biotin 5000 MCG capsule, Take  by mouth Daily., Disp: , Rfl:   •  butalbital-acetaminophen-caffeine (ESGIC) -40 MG per tablet, Take 1 tablet by mouth 2 (Two) Times a Day As Needed for Headache., Disp: 60 tablet, Rfl: 0  •  calcitriol (ROCALTROL) 0.25 MCG capsule, Take 1 capsule by mouth Daily., Disp: 30 capsule, Rfl: 5  •  Cranberry-Cholecalciferol 4200-500 MG-UNIT capsule, Take  by mouth Daily., Disp: , Rfl:   •  diphenoxylate-atropine (LOMOTIL) 2.5-0.025 MG per tablet, diphenoxylate-atropine 2.5 mg-0.025 mg tablet  take 2 tablets by mouth immediately then 1 tablet by mouth AFTER ...  (REFER TO PRESCRIPTION NOTES)., Disp: , Rfl:   •  docusate sodium (COLACE) 100 MG capsule, Take 1 capsule by mouth Daily., Disp: 90 capsule, Rfl: 3  •  DULoxetine (CYMBALTA) 60 MG capsule, Take 1 capsule by mouth Daily., Disp: 90 capsule, Rfl: 3  •  EPINEPHrine (EPIPEN) 0.3 MG/0.3ML solution auto-injector injection, Daily., Disp: , Rfl:   •  fentaNYL (DURAGESIC) 50 MCG/HR patch, Place 1 patch on the skin as directed by provider Every 72 (Seventy-Two) Hours., Disp: 10 patch, Rfl: 0  •  ferrous sulfate 325 (65 FE) MG tablet, Take 1 tablet by mouth Daily With Breakfast., Disp: 30 tablet, Rfl: 5  •  fexofenadine (ALLEGRA) 180 MG tablet, Take 180 mg by mouth Daily., Disp: , Rfl:   •  fluticasone (FLONASE) 50 MCG/ACT nasal spray, 2 sprays into the nostril(s) as directed by provider Daily., Disp: 1 bottle, Rfl: 5  •  furosemide (LASIX) 20 MG tablet, Take 40 mg by mouth Daily., Disp: , Rfl: 3  •  hydrALAZINE (APRESOLINE) 25 MG tablet, Take 1 tablet by mouth 3 (Three) Times a Day., Disp: 90 tablet, Rfl: 5  •  levothyroxine (SYNTHROID, LEVOTHROID) 50 MCG tablet, Take 1 tablet by mouth Daily., Disp:  30 tablet, Rfl: 1  •  lidocaine (XYLOCAINE,URO-JET) 2 % gel, apply TO mucosal membrane THREE TIMES DAILY AS NEEDED AS directed, Disp: , Rfl: 0  •  LORazepam (ATIVAN) 0.5 MG tablet, Take 0.5 mg by mouth 2 (Two) Times a Day., Disp: , Rfl:   •  melatonin 5 MG tablet tablet, Take 5 mg by mouth At Night As Needed., Disp: , Rfl:   •  Naloxegol Oxalate (MOVANTIK) 25 MG tablet, Take 25 mg by mouth Every Morning., Disp: , Rfl:   •  NAMZARIC 28-10 MG capsule sustained-release 24 hr, Take 28 mg by mouth Daily., Disp: 90 capsule, Rfl: 3  •  nitrofurantoin (MACRODANTIN) 100 MG capsule, Take 100 mg by mouth 2 (Two) Times a Day., Disp: , Rfl:   •  ondansetron ODT (ZOFRAN-ODT) 4 MG disintegrating tablet, Take 1 tablet by mouth Every 6 (Six) Hours As Needed for Nausea or Vomiting., Disp: 24 tablet, Rfl: 0  •  oxybutynin XL (DITROPAN-XL) 10 MG 24 hr tablet, Take 1 tablet by mouth Daily., Disp: 90 tablet, Rfl: 1  •  oxyCODONE (ROXICODONE) 5 MG immediate release tablet, Take 1 tablet by mouth Every 8 (Eight) Hours As Needed for Moderate Pain ., Disp: 60 tablet, Rfl: 0  •  potassium chloride (K-DUR) 10 MEQ CR tablet, Take 10 mEq by mouth 2 (Two) Times a Day., Disp: , Rfl: 2  •  predniSONE (DELTASONE) 5 MG tablet, Take 1 tablet by mouth Daily., Disp: 90 tablet, Rfl: 3  •  RESTASIS 0.05 % ophthalmic emulsion, 2 (Two) Times a Day., Disp: , Rfl: 6  •  rOPINIRole (REQUIP) 1 MG tablet, TAKE ONE TABLET BY MOUTH at bedtime, Disp: 90 tablet, Rfl: 1  •  sertraline (ZOLOFT) 100 MG tablet, Take 1 tablet by mouth Daily., Disp: 90 tablet, Rfl: 3  •  tiZANidine (ZANAFLEX) 4 MG tablet, Take 1 tablet by mouth Every 6 (Six) Hours As Needed for Muscle Spasms., Disp: 120 tablet, Rfl: 3    Allergies   Allergen Reactions   • Inderal  [Propranolol] Unknown (See Comments)   • L-Methylfolate-Algae-B12-B6 Unknown (See Comments)   • Morphine Nausea And Vomiting   • Rofecoxib Other (See Comments)   • Topiramate Other (See Comments)       Family History   Problem  Relation Age of Onset   • Breast cancer Maternal Aunt    • Leukemia Maternal Aunt        Cancer-related family history includes Breast cancer in her maternal aunt.    Social History     Tobacco Use   • Smoking status: Current Some Day Smoker     Packs/day: 1.50     Years: 30.00     Pack years: 45.00     Types: Cigarettes     Last attempt to quit: 2014     Years since quittin.6   • Smokeless tobacco: Never Used   • Tobacco comment: Patient does not want to quit   Substance Use Topics   • Alcohol use: No     Frequency: Never   • Drug use: No         I have reviewed the history of present illness, past medical history, family history, social history, lab results, all notes and other records since the patient was last seen on  Visit date not found.    ROS:     Review of Systems   Constitutional: Positive for fatigue (has not been able to get up and move around) and unexpected weight change (have lost #20 pounds in the past year). Negative for appetite change, chills and fever.   HENT: Negative for ear pain, mouth sores, nosebleeds and sore throat.    Eyes: Negative for photophobia and visual disturbance.   Respiratory: Negative for shortness of breath, wheezing and stridor.    Cardiovascular: Negative for chest pain and palpitations.   Gastrointestinal: Positive for nausea (sporadic). Negative for abdominal pain, diarrhea and vomiting.   Endocrine: Negative for cold intolerance and heat intolerance.   Genitourinary: Positive for flank pain. Negative for dysuria and hematuria.        On macro-bid for long-term kidney problems   Musculoskeletal: Positive for back pain (uses walker at home, cane in office), gait problem and myalgias. Negative for joint swelling and neck stiffness.        Bilateral foot pain associated with neuropathy     Skin: Negative for color change and rash.   Neurological: Positive for weakness (can barely walk outside) and headaches. Negative for seizures and syncope.   Hematological:  Negative for adenopathy.        No obvious bleeding   Psychiatric/Behavioral: Negative for agitation, confusion, hallucinations and sleep disturbance.       Objective:    There were no vitals filed for this visit.  ECOG  (1) Restricted in physically strenuous activity, ambulatory and able to do work of light nature    Physical Exam:   Physical Exam   Constitutional: She is oriented to person, place, and time. She appears well-developed and well-nourished. No distress.   Somewhat thin appearing elderly female   HENT:   Head: Normocephalic and atraumatic.   Eyes: Conjunctivae and EOM are normal. Right eye exhibits no discharge. Left eye exhibits no discharge. No scleral icterus.   Neck: Normal range of motion. Neck supple. No thyromegaly present.   Cardiovascular: Normal rate, regular rhythm and normal heart sounds. Exam reveals no gallop and no friction rub.   Pulmonary/Chest: Effort normal. No respiratory distress. She has no wheezes.   Decreased air entry bilaterally in the lungs.   Abdominal: Soft. Bowel sounds are normal. She exhibits no mass. There is no tenderness. There is no rebound and no guarding.   Musculoskeletal: Normal range of motion. She exhibits no tenderness.   Lymphadenopathy:     She has no cervical adenopathy.   Neurological: She is alert and oriented to person, place, and time. She exhibits normal muscle tone.   Skin: Skin is warm. No rash noted. She is not diaphoretic. No erythema.     She has a flesh-colored peeling rash involving her entire bilateral lower legs.  Also several flesh-colored small patches noted on other portions of the body.     Psychiatric: She has a normal mood and affect. Her behavior is normal.   Nursing note and vitals reviewed.            Lab Results - Last 18 Months   Lab Units 01/13/20  1544 01/10/20  1417 12/13/19  1209   WBC 10*3/mm3 9.56 7.87 6.69   HEMOGLOBIN g/dL 10.1* 11.1* 10.6*   HEMATOCRIT % 31.4* 34.0 33.8*   PLATELETS 10*3/mm3 326 290 284   MCV fL 96.9 96.6  98.3*     Lab Results - Last 18 Months   Lab Units 01/10/20  1417 12/13/19  1209 11/22/19  1237   SODIUM mmol/L 137 136 134*   POTASSIUM mmol/L 4.3 4.6 5.0   CHLORIDE mmol/L 98 100 99   CO2 mmol/L 25.0 22.0 24.0   BUN mg/dL 37* 23 31*   CREATININE mg/dL 1.34* 1.10* 1.27*   CALCIUM mg/dL 9.8 9.1 9.6   BILIRUBIN mg/dL 0.4 0.3 <0.2*   ALK PHOS U/L 122* 103 119*   ALT (SGPT) U/L 85* 61* 33   AST (SGOT) U/L 89* 65* 41*   GLUCOSE mg/dL 120* 148* 112*       Lab Results   Component Value Date    GLUCOSE 120 (H) 01/10/2020    BUN 37 (H) 01/10/2020    CREATININE 1.34 (H) 01/10/2020    EGFRIFNONA 38 (L) 01/10/2020    BCR 27.6 (H) 01/10/2020    K 4.3 01/10/2020    CO2 25.0 01/10/2020    CALCIUM 9.8 01/10/2020    ALBUMIN 3.80 01/10/2020    AST 89 (H) 01/10/2020    ALT 85 (H) 01/10/2020       Lab Results - Last 18 Months   Lab Units 09/12/19  0755   INR  0.96   APTT seconds 20.5*       Lab Results   Component Value Date    IRON 45 08/30/2019    TIBC 277 08/30/2019    FERRITIN 295.00 08/30/2019       Lab Results   Component Value Date    FOLATE 9.60 08/30/2019       No results found for: OCCULTBLD    No results found for: RETICCTPCT    Lab Results   Component Value Date    WDTIREZZ25 889 08/30/2019     No results found for: SPEP, UPEP  No results found for: LDH, URICACID  No results found for: JOSAFAT, RF, SEDRATE  No results found for: FIBRINOGEN, HAPTOGLOBIN  Lab Results   Component Value Date    PTT 20.5 (L) 09/12/2019    INR 0.96 09/12/2019     No results found for:   No results found for: CEA  No components found for: CA-19-9  No results found for: PSA          Assessment/Plan     There are no diagnoses linked to this encounter.     Assessment;  1. Metastatic lung cancer: Stage IV .she presented with a left lower lobe lung mass.  PET scan shows liver metastases and bone metastases.  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  Adenosquamous carcinoma tend to be more aggressive than AdenoCa or squamous  carcinomas.  Next generation sequencing testing was performed and it failed to show any targetable mutations.  IHC shows PDL 1 score of 60%.  Patient has been started on Keytruda and has received 4 cycles so far.  However repeat CT scan on 1/13/2020 shows stable disease to slight progression.  Patient has decided to go on hospice.  2. Grade 3 skin reaction from immunotherapy: Symptoms are resolved.  3. Lung cancer PDL 1 score 60%   4. Chronic lymphocytic leukemia:  The patient does not have any B symptoms or lymph node swelling.  She is EMERSON Stage 0.  Her bone marrow biopsy previously showed nodular involvement by CLL with nodular deposits occupying 5-10% of marrow cellularity.  FISH panel was negative.  Cytogenetics showed normal karyotype.  Overall, patient did not have any poor prognostic features.  Recent CBC shows a stable white count.  Platelet count is also normal and physical exam does not reveal any lymphadenopathy and she does not have any B symptoms.    5. Bone metastasis: Patient receiving Xgeva.  6. Enhancing lesion MRI brain: Only 4.4 mm.  It does not appear suspicious for metastasis.  7. History of hypothyroidism: Was not on any medications.  Re-started levothyroxine on 11/12/2019  8. Anemia of chronic kidney disease:  Her hemoglobin remains above 10.  She also had iron deficiency and is on iron supplements.   Iron stores are adequate.  She has been taking two iron tablets daily.    9. History of hypercalcemia: PET scan now shows bone metastasis.  PTH is in the high normal range.  Patient has CKD.  .  Vitamin D level is normal.  Will start patient on Xgeva.    10. History of mild iron deficiency.  She is on iron supplement.    11. Hx of Skin lesions: She previously had a skin biopsy.  She also reports having skin cancer excised on her right lower extremity with Dr. Buckley.    12. History of dizziness:  She had a thorough work up.  She saw cardiology.  She also saw an ophthalmologist.  She had a brain MRI  which was negative.  13. History of headaches:  She has history of migraines.      PLAN:      1. Patient and family have decided to go with hospice.  Treatments will be discontinued.  Will provide referral to hospice.  2. We will follow-up as needed           Thank you very much for providing the opportunity to participate in this patient’s care. Please do not hesitate to call if there are any other questions.    I have reviewed all relevant labs results, imaging,Outside reports,notes, vitals, medications and plan with the patient today.    Portions of the note have been Scribed by medical assistant/Nurse.  I have reviewed and made changes accordingly.

## 2020-03-03 ENCOUNTER — OFFICE VISIT (OUTPATIENT)
Dept: ONCOLOGY | Facility: CLINIC | Age: 80
End: 2020-03-03

## 2020-03-03 VITALS
HEIGHT: 63 IN | DIASTOLIC BLOOD PRESSURE: 42 MMHG | TEMPERATURE: 98.1 F | RESPIRATION RATE: 18 BRPM | BODY MASS INDEX: 15.24 KG/M2 | WEIGHT: 86 LBS | SYSTOLIC BLOOD PRESSURE: 116 MMHG | HEART RATE: 80 BPM

## 2020-03-03 DIAGNOSIS — C79.51 METASTASIS TO BONE (HCC): Primary | ICD-10-CM

## 2020-03-03 DIAGNOSIS — C34.32 PRIMARY CANCER OF LEFT LOWER LOBE OF LUNG (HCC): ICD-10-CM

## 2020-03-03 DIAGNOSIS — C78.7 LIVER METASTASIS: ICD-10-CM

## 2020-03-03 PROCEDURE — 99214 OFFICE O/P EST MOD 30 MIN: CPT | Performed by: INTERNAL MEDICINE

## 2020-03-03 RX ORDER — NITROFURANTOIN 25; 75 MG/1; MG/1
100 CAPSULE ORAL 2 TIMES DAILY
COMMUNITY
End: 2020-03-10 | Stop reason: SDUPTHER

## 2020-03-04 ENCOUNTER — TELEPHONE (OUTPATIENT)
Dept: ONCOLOGY | Facility: CLINIC | Age: 80
End: 2020-03-04

## 2020-03-04 NOTE — TELEPHONE ENCOUNTER
I spoke with Dani at [facility] and informed him that Dr. Batista would like another doctor to do patients Hospice Care.

## 2020-03-04 NOTE — TELEPHONE ENCOUNTER
Dani from [facility] is calling requesting to know if Dr. Batista would like to follow the hospice care or would Dr. Batista rather have another medical doctor follow the hospice care.    Please call Dani back at [phone] and ask for Hospice in regards to this.

## 2020-03-10 ENCOUNTER — OFFICE VISIT (OUTPATIENT)
Dept: FAMILY MEDICINE CLINIC | Facility: CLINIC | Age: 80
End: 2020-03-10

## 2020-03-10 VITALS
TEMPERATURE: 98.4 F | HEIGHT: 63 IN | OXYGEN SATURATION: 100 % | HEART RATE: 71 BPM | BODY MASS INDEX: 15.06 KG/M2 | DIASTOLIC BLOOD PRESSURE: 59 MMHG | SYSTOLIC BLOOD PRESSURE: 130 MMHG | WEIGHT: 85 LBS | RESPIRATION RATE: 18 BRPM

## 2020-03-10 DIAGNOSIS — C79.51 METASTASIS TO BONE (HCC): ICD-10-CM

## 2020-03-10 DIAGNOSIS — M51.36 DEGENERATION OF LUMBAR INTERVERTEBRAL DISC: ICD-10-CM

## 2020-03-10 DIAGNOSIS — G43.009 MIGRAINE WITHOUT AURA AND WITHOUT STATUS MIGRAINOSUS, NOT INTRACTABLE: ICD-10-CM

## 2020-03-10 DIAGNOSIS — C34.32 PRIMARY CANCER OF LEFT LOWER LOBE OF LUNG (HCC): Primary | ICD-10-CM

## 2020-03-10 DIAGNOSIS — G89.3 CHRONIC PAIN DUE TO NEOPLASM: ICD-10-CM

## 2020-03-10 DIAGNOSIS — C91.10 CHRONIC LYMPHOCYTIC LEUKEMIA (HCC): ICD-10-CM

## 2020-03-10 DIAGNOSIS — C78.7 LIVER METASTASIS: ICD-10-CM

## 2020-03-10 PROCEDURE — 99213 OFFICE O/P EST LOW 20 MIN: CPT | Performed by: FAMILY MEDICINE

## 2020-03-10 RX ORDER — BUTALBITAL, ACETAMINOPHEN AND CAFFEINE 50; 325; 40 MG/1; MG/1; MG/1
1 TABLET ORAL 2 TIMES DAILY PRN
Qty: 60 TABLET | Refills: 1 | Status: SHIPPED | OUTPATIENT
Start: 2020-03-10 | End: 2020-03-20 | Stop reason: SDUPTHER

## 2020-03-10 NOTE — PROGRESS NOTES
Subjective   Ann Andre is a 79 y.o. female.   Chief Complaint   Patient presents with   • Pain     Follow up pain management - Patient is now on [facility] and they will be managing her pain medications and Lorazepam.        History of Present Illness   Presents today for what I expected to be chronic pain management follow-up.  The source of her chronic pain is metastatic cancer.  She and her daughter tell me today that she had her last visit with Dr. Batista 3 days ago and they have decided to forego any further treatment.  She is now enrolled in [facility].  [facility] care is still following her.  Management of her fentanyl patch, oxycodone, Ativan and other symptomatic medications has been transferred to the hospice medical director.    Her only complaint for me today is that of an ongoing headache.  She has used Esgic in the past with good success and would like a refill of that.  Her weight is down 5 pounds from her weight 1 month ago.      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HCC) 09/24/2019   • Chronic constipation 09/24/2019   • Chronic pain 09/24/2019   • Chronic low back pain 09/24/2019   • Degeneration of lumbar intervertebral disc 09/24/2019   • Dementia (CMS/[facility]) 09/24/2019   • Depressive disorder 09/24/2019   • Dysphagia 09/24/2019   • Fatigue 09/24/2019   • Gastroesophageal reflux disease 09/24/2019   • Gastro-esophageal reflux disease with esophagitis 09/24/2019   • Generalized osteoarthritis 09/24/2019   • Generalized headache 09/24/2019   • Hypothyroidism 09/24/2019   • Insomnia 09/24/2019   • Leukocytosis 09/24/2019   • Nausea 09/24/2019   • Malignant tumor of  urinary bladder (CMS/HCC) 09/24/2019   • Neuropathy 09/24/2019   • Overactive bladder 09/24/2019   • Senile osteopenia 09/24/2019   • Stress incontinence of urine 09/24/2019   • Urethral stenosis 09/24/2019   • Urinary incontinence 09/24/2019   • Vomiting 09/24/2019   • Chronic lymphocytic leukemia (CMS/HCC) 09/24/2019   • Essential hypertension 09/24/2019   • Liver metastasis (CMS/HCC) 09/24/2019   • Lymphadenopathy 09/24/2019   • Metastasis to bone (CMS/HCC) 09/24/2019   • Primary cancer of left lower lobe of lung (CMS/HCC) 08/30/2019     Note Last Updated: 11/11/2019     mets to bone and liver on PET scan     • CLL (chronic lymphocytic leukemia) (CMS/HCC) 08/29/2019     Note Last Updated: 11/11/2019     Followed by Dr. Batista     • Anemia of chronic kidney failure, unspecified stage 08/29/2019   • Hypercalcemia 08/29/2019   • Iron deficiency 08/29/2019   • Chronic headaches 08/29/2019   • Dizziness 08/29/2019   • Fall 06/27/2019   • Chronic kidney disease (CKD) 06/27/2019   • Anemia of renal disease 06/27/2019   • Polypharmacy 06/27/2019   • Protein-calorie malnutrition, moderate (CMS/HCC) 06/27/2019   • Near syncope 02/04/2019   • Shortness of breath 06/12/2017   • Cardiac murmur 06/12/2017   • Migraine 01/27/2017           Past Surgical History:   Procedure Laterality Date   • APPENDECTOMY     • CHOLECYSTECTOMY     • HYSTERECTOMY     • JOINT REPLACEMENT     • OOPHORECTOMY     • SHOULDER SURGERY Left      Current Outpatient Medications on File Prior to Visit   Medication Sig   • Biotin 5000 MCG capsule Take  by mouth Daily.   • Cranberry-Cholecalciferol 4200-500 MG-UNIT capsule Take  by mouth Daily.   • diphenoxylate-atropine (LOMOTIL) 2.5-0.025 MG per tablet diphenoxylate-atropine 2.5 mg-0.025 mg tablet   take 2 tablets by mouth immediately then 1 tablet by mouth AFTER ...  (REFER TO PRESCRIPTION NOTES).   • docusate sodium (COLACE) 100 MG capsule Take 1 capsule by mouth Daily.   • DULoxetine (CYMBALTA) 60 MG  capsule Take 1 capsule by mouth Daily.   • EPINEPHrine (EPIPEN) 0.3 MG/0.3ML solution auto-injector injection Inject 0.3 mg into the appropriate muscle as directed by prescriber Daily As Needed.   • fentaNYL (DURAGESIC) 50 MCG/HR patch Place 1 patch on the skin as directed by provider Every 72 (Seventy-Two) Hours.   • ferrous sulfate 325 (65 FE) MG tablet Take 1 tablet by mouth Daily With Breakfast.   • fexofenadine (ALLEGRA) 180 MG tablet Take 180 mg by mouth Daily As Needed.   • fluticasone (FLONASE) 50 MCG/ACT nasal spray 2 sprays into the nostril(s) as directed by provider Daily. (Patient taking differently: into the nostril(s) as directed by provider Daily As Needed.)   • furosemide (LASIX) 20 MG tablet Take 40 mg by mouth Daily.   • hydrALAZINE (APRESOLINE) 25 MG tablet Take 1 tablet by mouth 3 (Three) Times a Day.   • levothyroxine (SYNTHROID, LEVOTHROID) 50 MCG tablet Take 1 tablet by mouth Daily.   • LORazepam (ATIVAN) 0.5 MG tablet Take 0.5 mg by mouth 2 (Two) Times a Day.   • melatonin 5 MG tablet tablet Take 5 mg by mouth At Night As Needed.   • Naloxegol Oxalate (MOVANTIK) 25 MG tablet Take 25 mg by mouth Every Morning.   • NAMZARIC 28-10 MG capsule sustained-release 24 hr Take 28 mg by mouth Daily.   • nitrofurantoin (MACRODANTIN) 100 MG capsule Take 100 mg by mouth 2 (Two) Times a Day.   • ondansetron ODT (ZOFRAN-ODT) 4 MG disintegrating tablet Take 1 tablet by mouth Every 6 (Six) Hours As Needed for Nausea or Vomiting.   • oxybutynin XL (DITROPAN-XL) 10 MG 24 hr tablet Take 1 tablet by mouth Daily.   • oxyCODONE (ROXICODONE) 5 MG immediate release tablet Take 1 tablet by mouth Every 8 (Eight) Hours As Needed for Moderate Pain .   • potassium chloride (K-DUR) 10 MEQ CR tablet Take 10 mEq by mouth 2 (Two) Times a Day.   • predniSONE (DELTASONE) 5 MG tablet Take 1 tablet by mouth Daily.   • RESTASIS 0.05 % ophthalmic emulsion 2 (Two) Times a Day.   • rOPINIRole (REQUIP) 1 MG tablet TAKE ONE TABLET BY  MOUTH at bedtime   • sertraline (ZOLOFT) 100 MG tablet Take 1 tablet by mouth Daily.   • tiZANidine (ZANAFLEX) 4 MG tablet Take 1 tablet by mouth Every 6 (Six) Hours As Needed for Muscle Spasms.   • [DISCONTINUED] butalbital-acetaminophen-caffeine (ESGIC) -40 MG per tablet Take 1 tablet by mouth 2 (Two) Times a Day As Needed for Headache.   • [DISCONTINUED] calcitriol (ROCALTROL) 0.25 MCG capsule Take 1 capsule by mouth Daily.   • [DISCONTINUED] lidocaine (XYLOCAINE,URO-JET) 2 % gel apply TO mucosal membrane THREE TIMES DAILY AS NEEDED AS directed   • [DISCONTINUED] nitrofurantoin, macrocrystal-monohydrate, (MACROBID) 100 MG capsule Take 100 mg by mouth 2 (Two) Times a Day.     No current facility-administered medications on file prior to visit.      Allergies   Allergen Reactions   • Inderal  [Propranolol] Unknown (See Comments)   • L-Methylfolate-Algae-B12-B6 Unknown (See Comments)   • Morphine Nausea And Vomiting   • Rofecoxib Other (See Comments)   • Topiramate Other (See Comments)     Social History     Socioeconomic History   • Marital status:      Spouse name: Not on file   • Number of children: Not on file   • Years of education: Not on file   • Highest education level: Not on file   Tobacco Use   • Smoking status: Current Some Day Smoker     Packs/day: 1.50     Years: 30.00     Pack years: 45.00     Types: Cigarettes     Last attempt to quit: 2014     Years since quittin.7   • Smokeless tobacco: Never Used   • Tobacco comment: Patient does not want to quit   Substance and Sexual Activity   • Alcohol use: No     Frequency: Never   • Drug use: No   • Sexual activity: Defer   Social History Narrative    She lives alone in [place], is , retired and has three grown children. She used to smoke about 1 1/2 ppd for 30 years and quit in . She reports a history of heavy alcohol use for about 40 years and now drinks socially. She does not use recreational drugs.     Family History  "  Problem Relation Age of Onset   • Breast cancer Maternal Aunt    • Leukemia Maternal Aunt      The following portions of the patient's history were reviewed and updated as appropriate: allergies, current medications, past family history, past medical history, past social history, past surgical history and problem list.    Review of Systems    Objective   /59 (BP Location: Right arm, Patient Position: Sitting, Cuff Size: Adult)   Pulse 71   Temp 98.4 °F (36.9 °C) (Oral)   Resp 18   Ht 160 cm (63\")   Wt 38.6 kg (85 lb)   SpO2 100%   BMI 15.06 kg/m²   Physical Exam   Constitutional: She is oriented to person, place, and time. She appears well-nourished. No distress.   Small statured elderly WF NAD.  Looks more frail today.   HENT:   Head: Normocephalic and atraumatic.   Eyes: Pupils are equal, round, and reactive to light. EOM are normal.   Neck: Neck supple. No JVD present. No thyromegaly present.   Cardiovascular: Normal rate, regular rhythm and normal heart sounds.   No murmur heard.  Pulmonary/Chest: Effort normal and breath sounds normal. No respiratory distress.   Abdominal: Soft. Bowel sounds are normal. She exhibits no distension. There is no tenderness. There is no guarding.   Musculoskeletal:   Muscle wasting in all 4 extremities   Neurological: She is alert and oriented to person, place, and time.   Skin: Skin is warm and dry. No rash noted.   Psychiatric: She has a normal mood and affect. Her behavior is normal.         Assessment/Plan   Diagnoses and all orders for this visit:    1. Primary cancer of left lower lobe of lung (CMS/HCC) (Primary)    2. Liver metastasis (CMS/HCC)    3. Metastasis to bone (CMS/HCC)    4. Chronic lymphocytic leukemia (CMS/HCC)    5. Chronic pain due to neoplasm    6. Degeneration of lumbar intervertebral disc    7. Migraine without aura and without status migrainosus, not intractable  -     butalbital-acetaminophen-caffeine (ESGIC) -40 MG per tablet; Take 1 " tablet by mouth 2 (Two) Times a Day As Needed for Headache.  Dispense: 60 tablet; Refill: 1    Will turn over care of chronic pain and Symptom management related to metastatic lung cancer to the hospice medical director.  I explained to her that I will still be available to help manage issues thought not directly related to her diagnosis for entering hospice.  I explained that she should contact her hospice nurse with any needs, if they determined that those needs felt outside of the services they provide, they will direct her back to me.  She has continued to lose weight despite trying to eat.  She actually has multiple types of cancer with multiple areas of mets.  She has no other medical problems that I think are so urgent that we should plan separate visits for.  I am happy to see her at any point and will ask her to follow-up as needed.  I wished her the best       Return if symptoms worsen or fail to improve.    Call with any problems or concerns before next visit

## 2020-03-20 DIAGNOSIS — G43.009 MIGRAINE WITHOUT AURA AND WITHOUT STATUS MIGRAINOSUS, NOT INTRACTABLE: ICD-10-CM

## 2020-03-20 RX ORDER — LEVOTHYROXINE SODIUM 0.05 MG/1
50 TABLET ORAL DAILY
Qty: 30 TABLET | Refills: 3 | Status: SHIPPED | OUTPATIENT
Start: 2020-03-20

## 2020-03-20 RX ORDER — PREDNISONE 1 MG/1
5 TABLET ORAL DAILY
Qty: 90 TABLET | Refills: 3 | Status: SHIPPED | OUTPATIENT
Start: 2020-03-20

## 2020-03-20 RX ORDER — DOCUSATE SODIUM 100 MG/1
100 CAPSULE, LIQUID FILLED ORAL DAILY
Qty: 90 CAPSULE | Refills: 3 | Status: SHIPPED | OUTPATIENT
Start: 2020-03-20

## 2020-03-20 RX ORDER — BUTALBITAL, ACETAMINOPHEN AND CAFFEINE 50; 325; 40 MG/1; MG/1; MG/1
1 TABLET ORAL 2 TIMES DAILY PRN
Qty: 60 TABLET | Refills: 1 | Status: SHIPPED | OUTPATIENT
Start: 2020-03-20

## 2020-03-20 RX ORDER — CYCLOSPORINE 0.5 MG/ML
1 EMULSION OPHTHALMIC 2 TIMES DAILY
Qty: 3 EACH | Refills: 5 | Status: SHIPPED | OUTPATIENT
Start: 2020-03-20

## 2020-03-20 RX ORDER — MEMANTINE HYDROCHLORIDE AND DONEPEZIL HYDROCHLORIDE 28; 10 MG/1; MG/1
1 CAPSULE ORAL DAILY
Qty: 90 CAPSULE | Refills: 3 | Status: SHIPPED | OUTPATIENT
Start: 2020-03-20

## 2020-03-20 RX ORDER — DULOXETIN HYDROCHLORIDE 60 MG/1
60 CAPSULE, DELAYED RELEASE ORAL DAILY
Qty: 90 CAPSULE | Refills: 3 | Status: SHIPPED | OUTPATIENT
Start: 2020-03-20

## 2020-03-20 RX ORDER — POTASSIUM CHLORIDE 750 MG/1
10 TABLET, FILM COATED, EXTENDED RELEASE ORAL 2 TIMES DAILY
Qty: 180 TABLET | Refills: 3 | Status: SHIPPED | OUTPATIENT
Start: 2020-03-20

## 2020-03-20 RX ORDER — FERROUS SULFATE 325(65) MG
325 TABLET ORAL
Qty: 30 TABLET | Refills: 5 | Status: SHIPPED | OUTPATIENT
Start: 2020-03-20

## 2020-03-20 RX ORDER — SERTRALINE HYDROCHLORIDE 100 MG/1
100 TABLET, FILM COATED ORAL DAILY
Qty: 90 TABLET | Refills: 3 | Status: SHIPPED | OUTPATIENT
Start: 2020-03-20

## 2020-03-20 RX ORDER — FEXOFENADINE HCL 180 MG/1
180 TABLET ORAL DAILY PRN
Qty: 90 TABLET | Refills: 3 | Status: SHIPPED | OUTPATIENT
Start: 2020-03-20

## 2020-03-20 RX ORDER — ROPINIROLE 1 MG/1
1 TABLET, FILM COATED ORAL
Qty: 90 TABLET | Refills: 3 | Status: SHIPPED | OUTPATIENT
Start: 2020-03-20

## 2020-03-20 RX ORDER — FLUTICASONE PROPIONATE 50 MCG
2 SPRAY, SUSPENSION (ML) NASAL DAILY
Qty: 1 BOTTLE | Refills: 5 | Status: SHIPPED | OUTPATIENT
Start: 2020-03-20

## 2020-03-20 RX ORDER — HYDRALAZINE HYDROCHLORIDE 25 MG/1
25 TABLET, FILM COATED ORAL 3 TIMES DAILY
Qty: 270 TABLET | Refills: 3 | Status: SHIPPED | OUTPATIENT
Start: 2020-03-20

## 2020-03-20 RX ORDER — OXYBUTYNIN CHLORIDE 10 MG/1
10 TABLET, EXTENDED RELEASE ORAL DAILY
Qty: 90 TABLET | Refills: 3 | Status: SHIPPED | OUTPATIENT
Start: 2020-03-20

## 2020-03-20 RX ORDER — NITROFURANTOIN MACROCRYSTALS 100 MG/1
100 CAPSULE ORAL 2 TIMES DAILY
Qty: 180 CAPSULE | Refills: 3 | Status: SHIPPED | OUTPATIENT
Start: 2020-03-20

## 2020-03-20 RX ORDER — FUROSEMIDE 20 MG/1
40 TABLET ORAL DAILY
Qty: 90 TABLET | Refills: 3 | Status: SHIPPED | OUTPATIENT
Start: 2020-03-20

## 2020-03-20 RX ORDER — TIZANIDINE 4 MG/1
4 TABLET ORAL EVERY 6 HOURS PRN
Qty: 120 TABLET | Refills: 3 | Status: SHIPPED | OUTPATIENT
Start: 2020-03-20

## 2020-03-20 NOTE — TELEPHONE ENCOUNTER
Received phone call from AMADEO Aviles with [facility] who states that patient needs all of her medications refilled except for her Ativan, and pain medications. All meds are loaded and ready to send to [facility] as requested.

## 2020-04-03 ENCOUNTER — TELEPHONE (OUTPATIENT)
Dept: FAMILY MEDICINE CLINIC | Facility: CLINIC | Age: 80
End: 2020-04-03

## 2020-04-03 NOTE — TELEPHONE ENCOUNTER
I am very happy to write her a letter.  Please cut-and-paste the following onto our letterhead:    April 3, 2020,    RE:  Ann Andre,  1940      To whom it may concern,    I am writing on behalf of my patient Ann Andre, date of birth 1940.  Ann has a multitude of advanced medical problems that are causing her health to decline rapidly.  This renders independent living no longer a safe and viable option for her.  I am recommending she have medically necessary 24-hour care and she will be moving in with her family to accommodate this.  Please take this into account as you examine any lease or rental agreements she has, and any requests to end such an agreement early.  Thank you for your consideration in this matter.    Sincerely,          Rochelle Fletcher MD

## 2020-04-03 NOTE — TELEPHONE ENCOUNTER
Patient's daughter, Ena called into office and left a voicemail stating the this patient is no longer able to live by herself due to her declining health conditions, and that the patient is now moving in with her. However, she is needing a physician's letter for the current landlord stating that the patient's health is declining, and that she will be moving in with family due to inability to live by herself. She would like the letter mailed to her at [phone]. Thanks.

## 2021-10-26 NOTE — PROGRESS NOTES
HEMATOLOGY ONCOLOGY FOLLOW UP        Patient name: Ann Andre  : 1940  MRN: [id number]  Primary Care Physician: Rochelle Fletcher MD  Referring Physician: Rochelle Fletcher, *  Reason For Consult:     Chief Complaint   Patient presents with   • Follow-up     CLL   • Follow-up     anemia of CKD   Chronic lymphocytic leukemia  Anemia    History of Present Illness:  Ms. Andre is a pleasant 78-year-old female who underwent lab work at her Primary Care Physician’s office on 10/27/14 that showed leukocytosis with a white count of 12.1 and normal hemoglobin and platelet count.  Her absolute lymphocyte count is 8,000 (elevated).  ESR was normal at 6.  The patient was therefore referred for hematology for further evaluation of the lymphocytosis.  She also has chronic kidney disease and her creatinine was 1.8 on 10/29/14.      14 - On initial evaluation in our office, WBC 12.2, hemoglobin 10.8, platelet count 311,000, lymphocyte count is almost 6,000.  The patient denied any fever, chills, night sweats, weight loss or lymph node swelling.  She does report having chronic anemia and also had received B12 injections in the past.  She reports history of mild chronic kidney disease.    • 14 - Creatinine 2.3, BUN 27, iron saturation 22%, TIBC 286, serum iron 64, ferritin 232, B12 566, reticulocyte count 1.3%.   • 14 - Flow cytometry:  Immunophenotyping identifies CD5 positive, CD23 positive, Monoclonal copper B cell population representing 24% of the total events.  CD 38 was negative.  This represents CLL.   • 1/22/15 - Bone marrow biopsy:  Variable cellular bone marrow with involvement by SLL/CLL in a nodular pattern.  Flow cytometry involvement 24% by CLL/SLL.  Nodular pattern of spread is considered a good prognostic finding.   Examination with PAS stain revealed several interstitial nodules which occupy 5% to 10% of the total marrow cellularity.  Cytogenetics showed normal  Mundo Gabriel from [address] [place] [address] Kieran Pacheco called and stated they do not go out to the area where the patient lives. Please advise.     Cb # [id number] karyotype.  CLL FISH panel:  Negative.  Specific probes for TP53 on chromosome 17, NABIL on chromosome 11, chromosome 12 and chromosome 13 were negative.  This indicates good prognosis.    • 2/12/15 - WBC 11.1, hemoglobin 10.9, platelet count 360,000, MCV 98.6.   • 7/14/15 - WBC 9.2, hemoglobin 10.1, platelet count 306,000, MCV 94.1.  • 4/21/15 - Iron saturation 21%, TIBC 264,000, serum iron 56, ferritin 199, EPO level 6, reticulocyte count 1.6%, haptoglobin 124.    • 10/20/15 - WBC 10.7, hemoglobin 11.2, platelet count 348,000.  • 2/16/16 - Iron saturation 25%, TIBC 250, serum iron 63.  WBC 7.7, hemoglobin 10.9, platelet count 293,000, MCV 91.3, lymphocytes 3,900.  • 3/14/16 - Creatinine 1.48.  • 8/30/16 - Lymphocyte count 4,370.  Iron saturation 26%, TIBC 262, serum iron 67.    • 9/6/16 - WBC 11.5, hemoglobin 11.6, platelet count 334,000, MCV 93.6.  • 1/2/17 - WBC 7.2, hemoglobin 10.6, platelet count 310,000, MCV 97.2.    • 5/22/17 - WBC 8.9, hemoglobin 10.7, platelet count 294,000, MCV 99.7.  Absolute lymphocyte count 3.66 (H).  Iron 50, TIBC 205, iron saturation 20%.    • 3/27/18 - WBC 10.1, hemoglobin 11, MCV 96.7, platelet count 301,000.  Serum iron 80, iron saturation 31%, TIBC 256.  Creatinine 1.5, BUN 27.    • 9/20/18 - WBC 9, hemoglobin 10, platelet count 284,000, .5.  Absolute lymphocyte count 4250 (H).  Ferritin 351.  Iron 51, TIBC 197.  • 12/28/18 - Creatinine 1.7, BUN 31.  Calcium 10.7 (H), albumin 4.1.  WBC 8.7, hemoglobin 10.3, platelet count 375,000, .6.    • 1/8/19 - TSH 1.74.  Ferritin 379.  Iron saturation 14%, TIBC 261, serum iron 37 (L).  SPEP appears normal.  Serum immunofixation:  No monoclonal band.  PTH intact 63 (normal range 14-64).  Serum calcium 10.3 (N).    • 3/12/19 - WBC 7.6, hemoglobin 11.5, platelet count 312,000, .9.  Creatinine 1.38, BUN 22, calcium 10.9 (H).  Vitamin D level 49 (N).  • 7/22/2019 CT scan chest without contrast: There is a new mass,  multilobulated within the left lower lobe, measures 3.1 x 2.3 cm.  Adjacent scarring is present as well.  Evidence of prior granulomatous disease, no obvious mediastinal or hilar lymphadenopathy..  Aneurysm of the ascending thoracic aorta.  CT scan was done in the context of left rib cage pain after having a fall.  • 7/18/2019 MRI brain: Possible enhancing lesion seen only on coronal image 8 measures 4.4 mm within the right precentral gyrus.  Per the radiologist, this could be an old infarct or metastases or pulsation artifact.  No acute infarct.  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  FDG avid left hilar lymphadenopathy with SUV of 8.2..  Multiple FDG avid hepatic and osseous lesions compatible with the metastasis.  There are approximately 15-20 FDG avid osseous lesions compatible with mets.  Index lesion involving the left posterior eighth rib demonstrates lytic changes with SUV of 12.9.  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 The biopsy is stained via immunohistochemical technique utilizing appropriate controls for the presence of CK7, CK5/6, p63, TTF-1, and napsin A. The tumor cells are strongly positive for all markers tested and indicate both a squamous and an adenocarcinoma component.        Subjective:09/24/19  Patient is here for a follow up appointment. She states she had a PFT recently. Patient states she has lost about 30 pounds in the past year and is eating 3 meals a day. She has seen Dr. Rivera, and had a CT guided fine needle biopsy. She is a former smoker. Lung cancer diagnosis was discussed with patient and her daughter.  She is reporting symptoms of persistent headaches.  Does not report any  visual problems.  She continues to lose weight.  She is feeling weak.  Denies any hemoptysis.  Also reports pain in bilateral rib cages.  But no point tenderness at any place.      The following portions of the patient's history were reviewed and updated as appropriate: allergies, current medications, past family history, past medical history, past social history, past surgical history and problem list.         Past Medical History:   Diagnosis Date   • Anemia of renal disease 6/27/2019   • Arthritis    • Cancer (CMS/HCC)    • Chronic back pain    • Chronic kidney disease (CKD)    • Depression    • Elevated cholesterol    • Gastroesophageal reflux disease 9/24/2019   • Hypertension    • Migraine    • Polypharmacy 6/27/2019   • Protein-calorie malnutrition, moderate (CMS/HCC) 6/27/2019   • Restless leg syndrome    • Stroke (CMS/HCC)        Past Surgical History:   Procedure Laterality Date   • APPENDECTOMY     • CHOLECYSTECTOMY     • HYSTERECTOMY     • JOINT REPLACEMENT     • OOPHORECTOMY     • SHOULDER SURGERY Left          Current Outpatient Medications:   •  amLODIPine (NORVASC) 5 MG tablet, Take 5 mg by mouth Daily., Disp: , Rfl:   •  Biotin 5000 MCG capsule, Take  by mouth Daily., Disp: , Rfl:   •  calcitriol (ROCALTROL) 0.25 MCG capsule, Take 0.25 mcg by mouth Daily., Disp: , Rfl:   •  Cranberry-Cholecalciferol 4200-500 MG-UNIT capsule, Take  by mouth Daily., Disp: , Rfl:   •  cyclobenzaprine (FLEXERIL) 10 MG tablet, Take 10 mg by mouth 3 (Three) Times a Day., Disp: , Rfl: 2  •  docusate sodium (COLACE) 100 MG capsule, Take 100 mg by mouth Daily., Disp: , Rfl:   •  DULoxetine (CYMBALTA) 60 MG capsule, Take 60 mg by mouth Daily., Disp: , Rfl:   •  EPINEPHrine (EPIPEN) 0.3 MG/0.3ML solution auto-injector injection, Daily., Disp: , Rfl:   •  fentaNYL (DURAGESIC) 50 MCG/HR patch, Place 1 patch on the skin as directed by provider Every 72 (Seventy-Two) Hours., Disp: , Rfl:   •  ferrous sulfate 325 (65 FE) MG tablet,  Take 325 mg by mouth Daily With Breakfast., Disp: , Rfl:   •  fluticasone (FLONASE) 50 MCG/ACT nasal spray, INHALE TWO SPRAYS EACH NOSTRIL EVERY DAY, Disp: , Rfl: 1  •  furosemide (LASIX) 20 MG tablet, Take 20 mg by mouth., Disp: , Rfl: 3  •  hydrALAZINE (APRESOLINE) 25 MG tablet, Take 25 mg by mouth 3 (Three) Times a Day., Disp: , Rfl:   •  levothyroxine (SYNTHROID, LEVOTHROID) 50 MCG tablet, Take 50 mcg by mouth Daily., Disp: , Rfl:   •  lidocaine (XYLOCAINE,URO-JET) 2 % gel, apply TO mucosal membrane THREE TIMES DAILY AS NEEDED AS directed, Disp: , Rfl: 0  •  LORazepam (ATIVAN) 0.5 MG tablet, Take 0.5 mg by mouth As Needed., Disp: , Rfl: 1  •  lubiprostone (AMITIZA) 24 MCG capsule, Take 24 mcg by mouth 2 (Two) Times a Day With Meals., Disp: , Rfl:   •  meclizine 25 MG chewable tablet chewable tablet, Chew 25 mg 2 (Two) Times a Day As Needed., Disp: , Rfl:   •  melatonin 5 MG tablet tablet, Take 5 mg by mouth At Night As Needed., Disp: , Rfl:   •  memantine (NAMENDA XR) 7 MG capsule sustained-release 24 hr extended release capsule, Take  by mouth Daily., Disp: , Rfl:   •  Naloxegol Oxalate (MOVANTIK) 25 MG tablet, Take 25 mg by mouth Every Morning., Disp: , Rfl:   •  NAMZARIC 28-10 MG capsule sustained-release 24 hr, Take 1 capsule by mouth Daily., Disp: , Rfl: 3  •  nitrofurantoin, macrocrystal-monohydrate, (MACROBID) 100 MG capsule, Take 100 mg by mouth 4 (Four) Times a Day., Disp: , Rfl: 5  •  omeprazole (priLOSEC) 40 MG capsule, Take 40 mg by mouth 2 (Two) Times a Day., Disp: , Rfl:   •  oxybutynin XL (DITROPAN-XL) 10 MG 24 hr tablet, Take 10 mg by mouth Daily., Disp: , Rfl:   •  polyethylene glycol (MIRALAX) packet, Take 17 g by mouth As Needed., Disp: , Rfl:   •  potassium chloride (K-DUR) 10 MEQ CR tablet, Take 10 mEq by mouth 2 (Two) Times a Day., Disp: , Rfl: 2  •  predniSONE (DELTASONE) 5 MG tablet, Take 5 mg by mouth Daily., Disp: , Rfl:   •  RESTASIS 0.05 % ophthalmic emulsion, 2 (Two) Times a Day.,  Disp: , Rfl: 6  •  rOPINIRole (REQUIP) 1 MG tablet, Take 1 mg by mouth 3 (Three) Times a Day. Take 1 hour before bedtime., Disp: , Rfl:   •  sertraline (ZOLOFT) 100 MG tablet, Take 100 mg by mouth Daily., Disp: , Rfl:   •  SUMAtriptan (IMITREX) 25 MG tablet, As Needed., Disp: , Rfl: 0  •  vitamin B-12 (CYANOCOBALAMIN) 1000 MCG tablet, Take 1,000 mcg by mouth Daily., Disp: , Rfl:     Allergies   Allergen Reactions   • Inderal  [Propranolol] Unknown (See Comments)   • L-Methylfolate-Algae-B12-B6 Unknown (See Comments)   • Morphine Nausea And Vomiting   • Rofecoxib Other (See Comments)   • Topiramate Other (See Comments)       Family History   Problem Relation Age of Onset   • Breast cancer Maternal Aunt    • Leukemia Maternal Aunt        Cancer-related family history includes Breast cancer in her maternal aunt.    Social History     Tobacco Use   • Smoking status: Former Smoker     Packs/day: 1.50     Years: 30.00     Pack years: 45.00     Types: Cigarettes     Last attempt to quit: 2014     Years since quittin.2   • Smokeless tobacco: Never Used   Substance Use Topics   • Alcohol use: No     Frequency: Never   • Drug use: No         I have reviewed the history of present illness, past medical history, family history, social history, lab results, all notes and other records since the patient was last seen on  Visit date not found.    ROS:     Review of Systems   Constitutional: Positive for fatigue and unexpected weight change (have lost #20 pounds in the past year). Negative for appetite change, chills and fever.   HENT: Negative for ear pain, mouth sores, nosebleeds and sore throat.    Eyes: Negative for photophobia and visual disturbance.   Respiratory: Positive for shortness of breath. Negative for wheezing and stridor.    Cardiovascular: Negative for chest pain and palpitations.   Gastrointestinal: Negative for abdominal pain, diarrhea, nausea and vomiting.   Endocrine: Negative for cold intolerance and  "heat intolerance.   Genitourinary: Negative for dysuria and hematuria.   Musculoskeletal: Positive for myalgias. Negative for joint swelling and neck stiffness.        Bilateral rib pain   Skin: Negative for color change and rash.   Neurological: Negative for seizures and syncope.   Hematological: Negative for adenopathy.        No obvious bleeding   Psychiatric/Behavioral: Negative for agitation, confusion, hallucinations and sleep disturbance.       Objective:    Vitals:    09/24/19 1341   BP: 166/50   Pulse: 74   Resp: 18   Temp: 98.5 °F (36.9 °C)   TempSrc: Oral   Weight: 45.8 kg (101 lb)   Height: 160 cm (63\")   PainSc: 0-No pain     ECOG  (1) Restricted in physically strenuous activity, ambulatory and able to do work of light nature    Physical Exam:   Physical Exam   Constitutional: She is oriented to person, place, and time. She appears well-developed and well-nourished. No distress.   Somewhat thin appearing elderly female   HENT:   Head: Normocephalic and atraumatic.   Eyes: Conjunctivae and EOM are normal. Right eye exhibits no discharge. Left eye exhibits no discharge. No scleral icterus.   Neck: Normal range of motion. Neck supple. No thyromegaly present.   Cardiovascular: Normal rate, regular rhythm and normal heart sounds. Exam reveals no gallop and no friction rub.   Pulmonary/Chest: Effort normal. No respiratory distress. She has no wheezes.   Decreased air entry bilaterally in the lungs.   Abdominal: Soft. Bowel sounds are normal. She exhibits no mass. There is no tenderness. There is no rebound and no guarding.   Musculoskeletal: Normal range of motion. She exhibits no tenderness.   Lymphadenopathy:     She has no cervical adenopathy.   Neurological: She is alert and oriented to person, place, and time. She exhibits normal muscle tone.   Skin: Skin is warm. No rash noted. She is not diaphoretic. No erythema.   Psychiatric: She has a normal mood and affect. Her behavior is normal.   Nursing note " and vitals reviewed.            Lab Results - Last 18 Months   Lab Units 09/12/19  0755 08/30/19  1150 06/27/19  0322   WBC 10*3/mm3 8.70 6.99 10.90*   HEMOGLOBIN g/dL 10.6* 9.8* 9.6*   HEMATOCRIT % 31.7* 30.2* 28.1*   PLATELETS 10*3/mm3 286 271 258   MCV fL 96.9 102.0* 96.5     Lab Results - Last 18 Months   Lab Units 06/27/19  0322 06/27/19  0044   SODIUM mmol/L 136 135*   POTASSIUM mmol/L 3.8 3.8   CHLORIDE mmol/L 101 97*   CO2 mmol/L 25.0 25.0   BUN mg/dL 25* 24*   CREATININE mg/dL 1.50* 1.50*   CALCIUM mg/dL 9.5 9.8   BILIRUBIN mg/dL  --  0.6   ALK PHOS U/L  --  63   ALT (SGPT) U/L  --  18   AST (SGOT) U/L  --  23   GLUCOSE mg/dL 117* 119*       Lab Results   Component Value Date    GLUCOSE 117 (H) 06/27/2019    BUN 25 (H) 06/27/2019    CREATININE 1.50 (H) 06/27/2019    EGFRIFNONA 33 (L) 06/27/2019    BCR 16.7 06/27/2019    K 3.8 06/27/2019    CO2 25.0 06/27/2019    CALCIUM 9.5 06/27/2019    ALBUMIN 4.20 06/27/2019    AST 23 06/27/2019    ALT 18 06/27/2019       Lab Results - Last 18 Months   Lab Units 09/12/19  0755   INR  0.96   APTT seconds 20.5*       Lab Results   Component Value Date    IRON 45 08/30/2019    TIBC 277 08/30/2019    FERRITIN 295.00 08/30/2019       Lab Results   Component Value Date    FOLATE 9.60 08/30/2019       No results found for: OCCULTBLD    No results found for: RETICCTPCT    Lab Results   Component Value Date    [id number] 889 08/30/2019     No results found for: SPEP, UPEP  No results found for: LDH, URICACID  No results found for: JOSAFAT, RF, SEDRATE  No results found for: FIBRINOGEN, HAPTOGLOBIN  Lab Results   Component Value Date    PTT 20.5 (L) 09/12/2019    INR 0.96 09/12/2019     No results found for:   No results found for: CEA  No components found for: CA-19-9  No results found for: PSA          Assessment/Plan     Assessment;  1. Metastatic lung cancer: Stage IV .she presented with a left lower lobe lung mass.  PET scan shows liver metastases and bone metastases.   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 Patient has a history of previous tobacco use.  Recently had an indeterminate lesion on her brain MRI.  2. Chronic lymphocytic leukemia:  The patient does not have any B symptoms or lymph node swelling.  She is EMERSON Stage 0.  Her bone marrow biopsy previously showed nodular involvement by CLL with nodular deposits occupying 5-10% of marrow cellularity.  FISH panel was negative.  Cytogenetics showed normal karyotype.  Overall, patient did not have any poor prognostic features.  Recent CBC shows a stable white count.  Platelet count is also normal and physical exam does not reveal any lymphadenopathy and she does not have any B symptoms.    3. Enhancing lesion MRI brain: Only 4.4 mm.  It does not appear suspicious for metastasis.  4. Anemia of chronic kidney disease:  Her hemoglobin remains above 10.  She also had iron deficiency and is on iron supplements.   Iron stores are adequate.  She has been taking two iron tablets daily.    5. History of hypercalcemia: PET scan now shows bone metastasis.  PTH is in the high normal range.  Patient has CKD.  .  Vitamin D level is normal.  Will start patient on Xgeva.    6. History of mild iron deficiency.  She is on iron supplement.    7. Hx of Skin lesions:  She is seeing a dermatologist.  She had a skin biopsy.  She also reports having skin cancer excised on her right lower extremity with Dr. Buckley.    8. History of dizziness:  She had a thorough work up.  She saw cardiology.  She also saw an ophthalmologist.  She had a brain MRI which was negative.  9. History of headaches:  She has history of migraines.      PLAN:    1. We will order next generation sequencing molecular testing on the lung cancer specimen.  Will use [employer] testing with IHC for PD L1.  Further treatment options would be offered after the results of the molecular testing.  Options including chemotherapy and immunotherapy and targeted agent therapy were  discussed with the patient.  2. Discussed at length regarding the prognosis of stage IV lung cancer.  3. Will order brain MRI for staging..  4. We will start patient on Xgeva for bone metastases.  5. Regarding hypercalcemia, advised patient told hold off her vitamin D and calcium supplements.  She needs to see her nephrologist regarding this problem for further input.    Follow-up in 2 weeks      Liver metastasis (CMS/HCC)    Mass of lower lobe of left lung    Lymphadenopathy    Chronic lymphocytic leukemia (CMS/HCC)    No orders of the defined types were placed in this encounter.                    Thank you very much for providing the opportunity to participate in this patient’s care. Please do not hesitate to call if there are any other questions.    I have reviewed all relevant labs results, imaging,Outside reports,notes, vitals, medications and plan with the patient today.    Portions of the note have been Scribed by medical assistant/Nurse.  I have reviewed and made changes accordingly.

## 2025-01-22 NOTE — PROGRESS NOTES
1/22/2025      Arianna Cazares  [address]  [facility] [address]      Dear Arianna Cazares,                                                                                                       Lucretia Perez MD wrote a referral for you to see a Behavioral Health provider. Our attempt to reach you by phone has been unsuccessful.     Please call the [facility] Scheduling Patient Line [phone]  at your earliest convenience. Let the  know that a behavioral health referral has been ordered and you are calling to discuss the referral.    We look forward to assisting you with your health care needs.    Sincerely,    [facility] Scheduling Patient Line [phone]        [facility]     Education for Administration of Chemotherapy and/or Biotherapy     10/30/19    Ann Andre  [id number]    Ms.Margaret TAMELA Andre is here today for education on their upcoming Chemotherapy and/or Biotherapy.     I will be going over their treatment options, obtain signed consent and answer any questions that they may have in regards to the administration of Keytruda and Xgeva. (Specific drug names listed below).     Ann Andre has already consulted with Dr Alfonso Batista for the treatment of metastatic lung cancer. The provider has gone over the same treatment options with the patient and answered their question prior to today's visit.   The goal of the treatment is to:    [] Cure my cancer - means treatment that kills cancer cells to the point my doctor                                     cannot find them in my body and they will not grow back.    [] Control my cancer - means treatment that keeps cancer from spreading or growing.    [x] Relieve my cancer symptoms - means treatment that helps problems such as pain or                                     pressure.     This treatment has been explained to Ann Andre. Alternative methods of treatment, if any, have been explained to Ann Andre as have the benefits and risks of each. Based on the physician's explanation of the benefits and risks of this treatment and any alternatives available, The patient agrees that the potential benefit's out weighs the risks involved. I have explained to the patient the most likely complications that might occur from this treatment. The patient understands that along with the treatment additional medications may be necessary to lesson the side effects. Possible side effect may include but are not limited to, any of the following, or a combination of the following:      Allergic Reaction Vision/Eye Changes Sexual Effects    []  High Blood Pressure  []  Skin and nail changes  []   Menopausal symptoms   []  Hearing Loss []  Ulceration at injection site []  Menstrual irregularities   [x]  Fatigue [x]  Skin rash   []  Fertility effects   [x]  Constipation  [x]  Diarrhea []  Hair loss  []  Light/temperature sensitivity []  Heart damage  [x]  Liver damage   [x]  Loss of appetite []  Dizziness []  Lung damage   [x]  Mouth Sores [x]  Muscle aching or weakness []  Kidney damage   [x]  Taste Changes []  Forgetfulness [x]  Nerve damage   [x]  Nausea or Vomiting [x]  Risk of blood clots [x]  Weight gain/loss   []  Secondary malignancies [x]  Risk of anemia  [x]  Risk of bleeding/bruising      While receiving treatment, it has been explained to the patient with regards to their blood counts. This may include but not limited to CBC, Neutropenia ,Anemia, Thrombocytopenia. This handout has been explained and given to the patient.     It was explained to the patient about nutrition and how important it is while undergoing Chemotherapy and/or Biotherapy. Certain medications will be prescribed during the treatment which may change the way foods taste or smell. These changes may cause poor or no appetite. Food is fuel for your body, and if it does not get the fuel it needs, your body may become mal-nourished, which can lead to sever fatigue.It was discussed with the patient about calories and how to add high-calorie foods to their diet.  Protein was also mentioned in regards to how this will help make new cells for the body. Information was given to Ann Andre in regards to some good protein sources.   We also discussed with the patient how important it was to drink/eat every 2-3 hours while awake. We discussed fluid intake of at least 6 8 ounce glassed of liquids per day to stay hydrated. Some of those are listed below:     Water  Juice (fruit or vegetable)  Soda Sport Drinks Soup   Milk  Ensure, Boost, Glucerna Ice Cream Popsicles Jello   Milkshakes Pudding  Gatorade Sherbert Yogurt     It has been  discussed with the patient the risks of becoming pregnant while receiving Chemotherapy and/or Biotherapy. We also discussed the importance of using reliable barrier methods while participating in intimate activities as this may expose their partners to a potentially harmful drug.     Further home instructions were given to the patient in regards to symptoms, treatment and how to handle those situations as well as when to contact the treatment team or the providers office.     Ann Andre was given handouts on:   1. Complete Blood Counts and terminology  2. Nutrition during Cancer Therapy   3. Home Instructions  4. [facility] handout on Keytruda  5. [facility] handout on Xgeva    I have discussed and gone over the full consent with the patient and answered all their questions regarding the medication they are to receive.  Written information has been provided and reviewed with Ann Andre. The patient and their family had a chance to ask any questions about the treatment medications and are satisfied with the information that was provided to them.     The patient has read and completed the consent form. They understand the possible risks and benefits of the recommended treatment plan and voluntarily agree to undergo the planned treatment. Should they change their mind and decide to stop treatment at any time, they will notify the providers office.       Yadira Louis RN  10/30/19  12:05 PM